# Patient Record
Sex: FEMALE | Race: WHITE | NOT HISPANIC OR LATINO | Employment: OTHER | URBAN - METROPOLITAN AREA
[De-identification: names, ages, dates, MRNs, and addresses within clinical notes are randomized per-mention and may not be internally consistent; named-entity substitution may affect disease eponyms.]

---

## 2017-02-06 ENCOUNTER — TELEPHONE (OUTPATIENT)
Dept: CARDIOLOGY | Facility: MEDICAL CENTER | Age: 82
End: 2017-02-06

## 2017-02-07 NOTE — TELEPHONE ENCOUNTER
----- Message from Brittny Lynn sent at 2/6/2017  4:23 PM PST -----  Regarding: [facility] calling for ICD-10 code  LESLIE/Josefina      Daria at [facility] is calling to get a ICD-10 code. She can be reached at [phone], ext. [phone].

## 2017-04-07 ENCOUNTER — OFFICE VISIT (OUTPATIENT)
Dept: CARDIOLOGY | Facility: CLINIC | Age: 82
End: 2017-04-07
Payer: MEDICARE

## 2017-04-07 VITALS
WEIGHT: 148 LBS | DIASTOLIC BLOOD PRESSURE: 72 MMHG | SYSTOLIC BLOOD PRESSURE: 144 MMHG | HEIGHT: 65 IN | OXYGEN SATURATION: 93 % | BODY MASS INDEX: 24.66 KG/M2 | HEART RATE: 68 BPM

## 2017-04-07 DIAGNOSIS — I48.0 PAROXYSMAL ATRIAL FIBRILLATION (HCC): ICD-10-CM

## 2017-04-07 DIAGNOSIS — R60.0 LOCALIZED EDEMA: ICD-10-CM

## 2017-04-07 DIAGNOSIS — I10 ESSENTIAL HYPERTENSION: ICD-10-CM

## 2017-04-07 DIAGNOSIS — J01.11 ACUTE RECURRENT FRONTAL SINUSITIS: ICD-10-CM

## 2017-04-07 PROCEDURE — G8432 DEP SCR NOT DOC, RNG: HCPCS | Performed by: INTERNAL MEDICINE

## 2017-04-07 PROCEDURE — 1036F TOBACCO NON-USER: CPT | Performed by: INTERNAL MEDICINE

## 2017-04-07 PROCEDURE — 4040F PNEUMOC VAC/ADMIN/RCVD: CPT | Performed by: INTERNAL MEDICINE

## 2017-04-07 PROCEDURE — G8420 CALC BMI NORM PARAMETERS: HCPCS | Performed by: INTERNAL MEDICINE

## 2017-04-07 PROCEDURE — 1101F PT FALLS ASSESS-DOCD LE1/YR: CPT | Mod: 8P | Performed by: INTERNAL MEDICINE

## 2017-04-07 PROCEDURE — 99214 OFFICE O/P EST MOD 30 MIN: CPT | Performed by: INTERNAL MEDICINE

## 2017-04-07 RX ORDER — OMEPRAZOLE 20 MG/1
20 CAPSULE, DELAYED RELEASE ORAL DAILY
COMMUNITY
Start: 2017-01-13 | End: 2019-10-29

## 2017-04-07 RX ORDER — IPRATROPIUM BROMIDE 42 UG/1
1 SPRAY, METERED NASAL DAILY
COMMUNITY
Start: 2017-03-16 | End: 2020-08-05

## 2017-04-07 RX ORDER — SPIRONOLACTONE 25 MG/1
TABLET ORAL
COMMUNITY
Start: 2017-03-22 | End: 2018-03-20 | Stop reason: SDUPTHER

## 2017-04-07 RX ORDER — DOXYCYCLINE 100 MG/1
100 TABLET ORAL 2 TIMES DAILY
Qty: 20 TAB | Refills: 0 | Status: SHIPPED | OUTPATIENT
Start: 2017-04-07 | End: 2018-03-20

## 2017-04-07 RX ORDER — FUROSEMIDE 20 MG/1
20 TABLET ORAL 2 TIMES DAILY
Qty: 60 TAB | Refills: 11 | Status: SHIPPED | OUTPATIENT
Start: 2017-04-07 | End: 2018-05-04 | Stop reason: SDUPTHER

## 2017-04-07 RX ORDER — SOTALOL HYDROCHLORIDE 80 MG/1
TABLET ORAL
COMMUNITY
Start: 2017-03-16 | End: 2018-05-05

## 2017-04-07 RX ORDER — FLUTICASONE PROPIONATE 50 MCG
1 SPRAY, SUSPENSION (ML) NASAL DAILY
COMMUNITY
Start: 2017-03-16 | End: 2020-08-05

## 2017-04-07 RX ORDER — AMOXICILLIN 500 MG/1
CAPSULE ORAL
COMMUNITY
Start: 2017-04-05 | End: 2018-05-05

## 2017-04-07 RX ORDER — APIXABAN 2.5 MG/1
2.5 TABLET, FILM COATED ORAL 2 TIMES DAILY
Status: ON HOLD | COMMUNITY
Start: 2017-04-04 | End: 2018-05-11

## 2017-04-07 ASSESSMENT — ENCOUNTER SYMPTOMS
NEUROLOGICAL NEGATIVE: 1
WEAKNESS: 0
HEMOPTYSIS: 0
STRIDOR: 0
CONSTITUTIONAL NEGATIVE: 1
COUGH: 0
SPUTUM PRODUCTION: 0
FEVER: 0
CARDIOVASCULAR NEGATIVE: 1
BRUISES/BLEEDS EASILY: 0
LOSS OF CONSCIOUSNESS: 0
DIZZINESS: 0
WHEEZING: 0
SHORTNESS OF BREATH: 0
SORE THROAT: 0
CHILLS: 0
GASTROINTESTINAL NEGATIVE: 1
EYES NEGATIVE: 1
RESPIRATORY NEGATIVE: 1
MUSCULOSKELETAL NEGATIVE: 1
CLAUDICATION: 0
ORTHOPNEA: 0
PND: 0
PALPITATIONS: 0

## 2017-04-07 NOTE — PROGRESS NOTES
Subjective:   Jeny Bennett is a 83 y.o. female who presents today for follow up for her LE and a-fib.  No recurrence of her a-fib.  She is still bothered by some LE edema.  Otherwise she is feeling well.    Past Medical History   Diagnosis Date   • Arthritis    • Back pain    • Fall 12-   • Hypertension    • Arrhythmia      History reviewed. No pertinent past surgical history.  Family History   Problem Relation Age of Onset   • Arthritis Mother    • Cancer Mother    • Hypertension Mother    • Hypertension Father    • Heart Disease Father    • Hyperlipidemia Father    • Lung Disease Sister    • Hypertension Sister      History   Smoking status   • Former Smoker -- 0.50 packs/day   • Types: Cigarettes   • Quit date: 01/01/1989   Smokeless tobacco   • Never Used     Allergies   Allergen Reactions   • Amiodarone      Atrial fibrillation..organ prob's   • Sulfa Drugs      Rash, chills, fever   • Augmentin      Outpatient Encounter Prescriptions as of 4/7/2017   Medication Sig Dispense Refill   • omeprazole (PRILOSEC) 20 MG delayed-release capsule      • spironolactone (ALDACTONE) 25 MG Tab      • ELIQUIS 2.5 MG Tab      • amoxicillin (AMOXIL) 500 MG Cap      • SOTALOL AF 80 MG Tab      • fluticasone (FLONASE) 50 MCG/ACT nasal spray      • ipratropium (ATROVENT) 0.06 % Solution      • mupirocin (BACTROBAN) 2 % Ointment      • famotidine (PEPCID) 20 MG Tab Take 20 mg by mouth 2 times a day.     • venlafaxine XR (EFFEXOR XR) 37.5 MG CAPSULE SR 24 HR Take 37.5 mg by mouth every day.     • meloxicam (MOBIC) 7.5 MG TABS Take 7.5 mg by mouth 2 Times a Day.     • hydrocodone-acetaminophen (NORCO) 7.5-325 MG per tablet Take 1-2 Tabs by mouth every four hours as needed for Severe Pain.       No facility-administered encounter medications on file as of 4/7/2017.     Review of Systems   Constitutional: Negative.  Negative for fever, chills and malaise/fatigue.   HENT: Negative.  Negative for sore throat.    Eyes:  "Negative.    Respiratory: Negative.  Negative for cough, hemoptysis, sputum production, shortness of breath, wheezing and stridor.    Cardiovascular: Negative.  Negative for chest pain, palpitations, orthopnea, claudication, leg swelling and PND.   Gastrointestinal: Negative.    Genitourinary: Negative.    Musculoskeletal: Negative.    Skin: Negative.    Neurological: Negative.  Negative for dizziness, loss of consciousness and weakness.   Endo/Heme/Allergies: Negative.  Does not bruise/bleed easily.   All other systems reviewed and are negative.       Objective:   /72 mmHg  Pulse 68  Ht 1.651 m (5' 5\")  Wt 67.132 kg (148 lb)  BMI 24.63 kg/m2  SpO2 93%    Physical Exam   Constitutional: She is oriented to person, place, and time. She appears well-developed and well-nourished. No distress.   HENT:   Head: Normocephalic.   Mouth/Throat: Oropharynx is clear and moist.   Eyes: EOM are normal. Pupils are equal, round, and reactive to light. Right eye exhibits no discharge. Left eye exhibits no discharge. No scleral icterus.   Neck: Normal range of motion. Neck supple. No JVD present. No tracheal deviation present.   Cardiovascular: Normal rate, regular rhythm, S1 normal, S2 normal, normal heart sounds, intact distal pulses and normal pulses.  Exam reveals no gallop, no S3, no S4 and no friction rub.    No murmur heard.   No systolic murmur is present    No diastolic murmur is present   Pulses:       Carotid pulses are 2+ on the right side, and 2+ on the left side.       Radial pulses are 2+ on the right side, and 2+ on the left side.        Dorsalis pedis pulses are 2+ on the right side, and 2+ on the left side.        Posterior tibial pulses are 2+ on the right side, and 2+ on the left side.   Pulmonary/Chest: Effort normal and breath sounds normal. No respiratory distress. She has no wheezes. She has no rales.   Abdominal: Soft. Bowel sounds are normal. She exhibits no distension and no mass. There is no " tenderness. There is no rebound and no guarding.   Musculoskeletal: She exhibits no edema.   Neurological: She is alert and oriented to person, place, and time. No cranial nerve deficit.   Skin: Skin is warm and dry. She is not diaphoretic. No pallor.   Psychiatric: She has a normal mood and affect. Her behavior is normal. Judgment and thought content normal.   Nursing note and vitals reviewed.      Assessment:     1. Paroxysmal atrial fibrillation (CMS-HCC)     2. Localized edema     3. Essential hypertension         Medical Decision Making:  Today's Assessment / Status / Plan:     84 y/o F with LE edema whoich is bothering her and controlled a-fib.  I will start a low dose of lasix for her LE edema and check labs in one week.  i will see her back in 6 months.    Thank for you allowing me to take part in your patient's care, please call should you have any questions or would like to discuss this patient.

## 2017-04-11 DIAGNOSIS — I10 ESSENTIAL HYPERTENSION: ICD-10-CM

## 2017-04-11 DIAGNOSIS — Z79.899 HIGH RISK MEDICATION USE: ICD-10-CM

## 2017-04-18 ENCOUNTER — TELEPHONE (OUTPATIENT)
Dept: CARDIOLOGY | Facility: MEDICAL CENTER | Age: 82
End: 2017-04-18

## 2017-04-18 NOTE — TELEPHONE ENCOUNTER
BASIC METABOLIC PANEL   Status: Final result     Visible to patient:  Not Released     Dx:  High risk medication use; Essential h...               Notes Recorded by Eduardo Maddox M.D. on 4/18/2017 at 10:42 AM  Labs look good  Notes Recorded by Josefina Arenas L.P.N. on 4/17/2017 at 12:15 PM  FU 10/19 in [place] with Dr. Maddox     Pt notified of results.

## 2017-04-21 ENCOUNTER — TELEPHONE (OUTPATIENT)
Dept: CARDIOLOGY | Facility: MEDICAL CENTER | Age: 82
End: 2017-04-21

## 2017-04-21 NOTE — TELEPHONE ENCOUNTER
LESLIE/Lisa   Patient is calling about dental clearance for a cleaning. She can be reached at [phone].              L/m for pt on home phone and cell phone about clearance. Pt should not need clearance for dental cleaning nor need to hold her eliquis. Informed pt to please call back to discuss further.

## 2017-06-14 ENCOUNTER — TELEPHONE (OUTPATIENT)
Dept: CARDIOLOGY | Facility: MEDICAL CENTER | Age: 82
End: 2017-06-14

## 2017-06-14 NOTE — TELEPHONE ENCOUNTER
dental office calling for clearance  Received: Today       BRANDAN Colunga/Any Maldonado @ Dr. Caruso office in [place] is calling for clearance. They need patient to stop Xarelto for dental work. She can be reached at [phone]              Returned call. Requested that clearance be faxed, we will complete and fax back.

## 2018-01-08 ENCOUNTER — TELEPHONE (OUTPATIENT)
Dept: CARDIOLOGY | Facility: MEDICAL CENTER | Age: 83
End: 2018-01-08

## 2018-01-08 NOTE — TELEPHONE ENCOUNTER
Patient wants call back asap about stopping blood thinner   Received: Today   Message Contents   BRANDAN Ling/Any     Patient has dental work scheduled for tomorrow, 1/9 and wants to find out about stopping her blood thinner. She wants a call back asap and can be reached at [phone].      Returned patient call. Pt is having a general dental filling tomorrow and wants to know if it is OK to continue to take Eliquis. R/o it is not a crown buildup. Pt advised to take medications as routine, pt also advised that is dental office needed her to hold, they would have notified her and contacted us per routine. Pt states understanding.

## 2018-03-06 ENCOUNTER — TELEPHONE (OUTPATIENT)
Dept: CARDIOLOGY | Facility: MEDICAL CENTER | Age: 83
End: 2018-03-06

## 2018-03-20 ENCOUNTER — OFFICE VISIT (OUTPATIENT)
Dept: CARDIOLOGY | Facility: MEDICAL CENTER | Age: 83
End: 2018-03-20
Payer: MEDICARE

## 2018-03-20 VITALS
HEART RATE: 68 BPM | WEIGHT: 145 LBS | OXYGEN SATURATION: 97 % | SYSTOLIC BLOOD PRESSURE: 180 MMHG | BODY MASS INDEX: 24.13 KG/M2 | DIASTOLIC BLOOD PRESSURE: 90 MMHG

## 2018-03-20 DIAGNOSIS — Z79.899 HIGH RISK MEDICATION USE: ICD-10-CM

## 2018-03-20 DIAGNOSIS — R60.0 LOCALIZED EDEMA: ICD-10-CM

## 2018-03-20 DIAGNOSIS — I48.0 PAROXYSMAL ATRIAL FIBRILLATION (HCC): ICD-10-CM

## 2018-03-20 DIAGNOSIS — I10 ESSENTIAL HYPERTENSION: ICD-10-CM

## 2018-03-20 PROCEDURE — 99215 OFFICE O/P EST HI 40 MIN: CPT | Performed by: INTERNAL MEDICINE

## 2018-03-20 RX ORDER — SPIRONOLACTONE 25 MG/1
25 TABLET ORAL DAILY
Qty: 30 TAB | Refills: 11 | Status: ON HOLD | OUTPATIENT
Start: 2018-03-20 | End: 2018-05-11

## 2018-03-20 ASSESSMENT — ENCOUNTER SYMPTOMS
NEUROLOGICAL NEGATIVE: 1
STRIDOR: 0
CONSTITUTIONAL NEGATIVE: 1
CARDIOVASCULAR NEGATIVE: 1
RESPIRATORY NEGATIVE: 1
CLAUDICATION: 0
PALPITATIONS: 0
SPUTUM PRODUCTION: 0
DIZZINESS: 0
COUGH: 0
SHORTNESS OF BREATH: 0
GASTROINTESTINAL NEGATIVE: 1
EYES NEGATIVE: 1
HEMOPTYSIS: 0
SORE THROAT: 0
MUSCULOSKELETAL NEGATIVE: 1
BRUISES/BLEEDS EASILY: 0
WHEEZING: 0
WEAKNESS: 0
CHILLS: 0
ORTHOPNEA: 0
FEVER: 0
LOSS OF CONSCIOUSNESS: 0
PND: 0

## 2018-03-20 NOTE — LETTER
[facility]   [address]   [address]  [place] [address]  Phone: [phone]  Fax: [phone]              Jeny Bennett  5/19/1933    Encounter Date: 3/20/2018    Eduardo Maddox M.D.          PROGRESS NOTE:  Chief Complaint   Patient presents with   • Atrial Fibrillation     follow up   edema  High risk medication      Subjective:   Jeny Bennett is a 84 y.o. female who presents today for her paroxysmal atrial flutter ablation hypertension and lower extremity edema. Apparently there was some confusion about what dose of Lasix she should've been taking. She's only taking the Lasix once per day. The lower extremity edema we noted her last visit essentially resolved. Her blood pressure continues to be elevated. It's in the 160s consistently. She does check it at home. She is also taking, in addition of NSAIDs with her oral anticoagulation. She's not doing this every day and is concerned about the risk of bleeding. She was told 10-20 years ago to be careful with bleeding because she had a case of colitis that was seen by a GI physician. In the meantime she did not have any worsening chest pain palpitations or PND. She also completed the labs that we gave her last year. She is not currently taking the spironolactone we prescribed to her. She is also not clear what dose of lisinopril she's currently taking.    Past Medical History:   Diagnosis Date   • Arrhythmia    • Arthritis    • Back pain    • Fall 12-   • Hypertension      History reviewed. No pertinent surgical history.  Family History   Problem Relation Age of Onset   • Arthritis Mother    • Cancer Mother    • Hypertension Mother    • Hypertension Father    • Heart Disease Father    • Hyperlipidemia Father    • Lung Disease Sister    • Hypertension Sister      Social History     Social History   • Marital status: Unknown     Spouse name: N/A   • Number of children:  N/A   • Years of education: N/A     Occupational History   • Not on file.     Social History Main Topics   • Smoking status: Former Smoker     Packs/day: 0.50     Types: Cigarettes     Quit date: 1/1/1989   • Smokeless tobacco: Never Used   • Alcohol use Not on file   • Drug use: Unknown   • Sexual activity: Not on file     Other Topics Concern   • Not on file     Social History Narrative   • No narrative on file     Allergies   Allergen Reactions   • Amiodarone      Atrial fibrillation..organ prob's   • Sulfa Drugs      Rash, chills, fever   • Augmentin      Outpatient Encounter Prescriptions as of 3/20/2018   Medication Sig Dispense Refill   • spironolactone (ALDACTONE) 25 MG Tab Take 1 Tab by mouth every day. 30 Tab 11   • omeprazole (PRILOSEC) 20 MG delayed-release capsule      • ELIQUIS 2.5 MG Tab      • amoxicillin (AMOXIL) 500 MG Cap      • SOTALOL AF 80 MG Tab      • fluticasone (FLONASE) 50 MCG/ACT nasal spray      • ipratropium (ATROVENT) 0.06 % Solution      • mupirocin (BACTROBAN) 2 % Ointment      • furosemide (LASIX) 20 MG Tab Take 1 Tab by mouth 2 times a day. 60 Tab 11   • famotidine (PEPCID) 20 MG Tab Take 20 mg by mouth 2 times a day.     • venlafaxine XR (EFFEXOR XR) 37.5 MG CAPSULE SR 24 HR Take 37.5 mg by mouth every day.     • meloxicam (MOBIC) 7.5 MG TABS Take 7.5 mg by mouth 2 Times a Day.     • hydrocodone-acetaminophen (NORCO) 7.5-325 MG per tablet Take 1-2 Tabs by mouth every four hours as needed for Severe Pain.     • [DISCONTINUED] spironolactone (ALDACTONE) 25 MG Tab      • [DISCONTINUED] doxycycline monohydrate (ADOXA) 100 MG tablet Take 1 Tab by mouth 2 times a day. (Patient not taking: Reported on 3/20/2018) 20 Tab 0     No facility-administered encounter medications on file as of 3/20/2018.      Review of Systems   Constitutional: Negative.  Negative for chills, fever and malaise/fatigue.   HENT: Negative.  Negative for sore throat.    Eyes: Negative.    Respiratory: Negative.   Negative for cough, hemoptysis, sputum production, shortness of breath, wheezing and stridor.    Cardiovascular: Negative.  Negative for chest pain, palpitations, orthopnea, claudication, leg swelling and PND.   Gastrointestinal: Negative.    Genitourinary: Negative.    Musculoskeletal: Negative.    Skin: Negative.    Neurological: Negative.  Negative for dizziness, loss of consciousness and weakness.   Endo/Heme/Allergies: Negative.  Does not bruise/bleed easily.   All other systems reviewed and are negative.       Objective:   BP (!) 180/90   Pulse 68   Wt 65.8 kg (145 lb)   SpO2 97%   BMI 24.13 kg/m²      Physical Exam   Constitutional: She appears well-developed and well-nourished. No distress.   HENT:   Head: Normocephalic and atraumatic.   Right Ear: External ear normal.   Left Ear: External ear normal.   Nose: Nose normal.   Mouth/Throat: No oropharyngeal exudate.   Eyes: Conjunctivae and EOM are normal. Pupils are equal, round, and reactive to light. Right eye exhibits no discharge. Left eye exhibits no discharge. No scleral icterus.   Neck: Neck supple. No JVD present.   Cardiovascular: Normal rate, regular rhythm and intact distal pulses.  Exam reveals no gallop and no friction rub.    No murmur heard.  Pulmonary/Chest: Effort normal. No stridor. No respiratory distress. She has no wheezes. She has no rales. She exhibits no tenderness.   Abdominal: Soft. She exhibits no distension. There is no guarding.   Musculoskeletal: Normal range of motion. She exhibits no edema, tenderness or deformity.   Neurological: She is alert. She has normal reflexes. She displays normal reflexes. No cranial nerve deficit. She exhibits normal muscle tone. Coordination normal.   Skin: Skin is warm and dry. No rash noted. She is not diaphoretic. No erythema. No pallor.   Psychiatric: She has a normal mood and affect. Her behavior is normal. Judgment and thought content normal.   Nursing note and vitals  reviewed.      Assessment:     1. Paroxysmal atrial fibrillation (CMS-HCC)     2. Localized edema  spironolactone (ALDACTONE) 25 MG Tab    BASIC METABOLIC PANEL   3. Essential hypertension  spironolactone (ALDACTONE) 25 MG Tab    BASIC METABOLIC PANEL   4. High risk medication use           Medical Decision Making:  Today's Assessment / Status / Plan:     84-year-old female with paroxysmal atrial fibrillation and worsening lower extremity edema with blood pressure which is poorly controlled. For her atrial fibrillation no changes today. She is in sinus rhythm. For her high blood pressure I've asked her to restart the spironolactone and contact the clinic and let us know what dose of lisinopril she is currently taking. We will get labs in one week to verify she is not developing hyperkalemia. For her lower extremity edema similarly we will start spironolactone. I've also advised her to stay on the Lasix once per day.    Thank for you allowing me to take part in your patient's care, please call should you have any questions or would like to discuss this patient.      Cee Raoms M.D.  [address] [facility] [address]  VIA Facsimile: [phone]

## 2018-03-20 NOTE — PROGRESS NOTES
Chief Complaint   Patient presents with   • Atrial Fibrillation     follow up   edema  High risk medication      Subjective:   Jeny Bennett is a 84 y.o. female who presents today for her paroxysmal atrial flutter ablation hypertension and lower extremity edema. Apparently there was some confusion about what dose of Lasix she should've been taking. She's only taking the Lasix once per day. The lower extremity edema we noted her last visit essentially resolved. Her blood pressure continues to be elevated. It's in the 160s consistently. She does check it at home. She is also taking, in addition of NSAIDs with her oral anticoagulation. She's not doing this every day and is concerned about the risk of bleeding. She was told 10-20 years ago to be careful with bleeding because she had a case of colitis that was seen by a GI physician. In the meantime she did not have any worsening chest pain palpitations or PND. She also completed the labs that we gave her last year. She is not currently taking the spironolactone we prescribed to her. She is also not clear what dose of lisinopril she's currently taking.    Past Medical History:   Diagnosis Date   • Arrhythmia    • Arthritis    • Back pain    • Fall 12-   • Hypertension      History reviewed. No pertinent surgical history.  Family History   Problem Relation Age of Onset   • Arthritis Mother    • Cancer Mother    • Hypertension Mother    • Hypertension Father    • Heart Disease Father    • Hyperlipidemia Father    • Lung Disease Sister    • Hypertension Sister      Social History     Social History   • Marital status: Unknown     Spouse name: N/A   • Number of children: N/A   • Years of education: N/A     Occupational History   • Not on file.     Social History Main Topics   • Smoking status: Former Smoker     Packs/day: 0.50     Types: Cigarettes     Quit date: 1/1/1989   • Smokeless tobacco: Never Used   • Alcohol use Not on file   • Drug use: Unknown   •  Sexual activity: Not on file     Other Topics Concern   • Not on file     Social History Narrative   • No narrative on file     Allergies   Allergen Reactions   • Amiodarone      Atrial fibrillation..organ prob's   • Sulfa Drugs      Rash, chills, fever   • Augmentin      Outpatient Encounter Prescriptions as of 3/20/2018   Medication Sig Dispense Refill   • spironolactone (ALDACTONE) 25 MG Tab Take 1 Tab by mouth every day. 30 Tab 11   • omeprazole (PRILOSEC) 20 MG delayed-release capsule      • ELIQUIS 2.5 MG Tab      • amoxicillin (AMOXIL) 500 MG Cap      • SOTALOL AF 80 MG Tab      • fluticasone (FLONASE) 50 MCG/ACT nasal spray      • ipratropium (ATROVENT) 0.06 % Solution      • mupirocin (BACTROBAN) 2 % Ointment      • furosemide (LASIX) 20 MG Tab Take 1 Tab by mouth 2 times a day. 60 Tab 11   • famotidine (PEPCID) 20 MG Tab Take 20 mg by mouth 2 times a day.     • venlafaxine XR (EFFEXOR XR) 37.5 MG CAPSULE SR 24 HR Take 37.5 mg by mouth every day.     • meloxicam (MOBIC) 7.5 MG TABS Take 7.5 mg by mouth 2 Times a Day.     • hydrocodone-acetaminophen (NORCO) 7.5-325 MG per tablet Take 1-2 Tabs by mouth every four hours as needed for Severe Pain.     • [DISCONTINUED] spironolactone (ALDACTONE) 25 MG Tab      • [DISCONTINUED] doxycycline monohydrate (ADOXA) 100 MG tablet Take 1 Tab by mouth 2 times a day. (Patient not taking: Reported on 3/20/2018) 20 Tab 0     No facility-administered encounter medications on file as of 3/20/2018.      Review of Systems   Constitutional: Negative.  Negative for chills, fever and malaise/fatigue.   HENT: Negative.  Negative for sore throat.    Eyes: Negative.    Respiratory: Negative.  Negative for cough, hemoptysis, sputum production, shortness of breath, wheezing and stridor.    Cardiovascular: Negative.  Negative for chest pain, palpitations, orthopnea, claudication, leg swelling and PND.   Gastrointestinal: Negative.    Genitourinary: Negative.    Musculoskeletal: Negative.     Skin: Negative.    Neurological: Negative.  Negative for dizziness, loss of consciousness and weakness.   Endo/Heme/Allergies: Negative.  Does not bruise/bleed easily.   All other systems reviewed and are negative.       Objective:   BP (!) 180/90   Pulse 68   Wt 65.8 kg (145 lb)   SpO2 97%   BMI 24.13 kg/m²     Physical Exam   Constitutional: She appears well-developed and well-nourished. No distress.   HENT:   Head: Normocephalic and atraumatic.   Right Ear: External ear normal.   Left Ear: External ear normal.   Nose: Nose normal.   Mouth/Throat: No oropharyngeal exudate.   Eyes: Conjunctivae and EOM are normal. Pupils are equal, round, and reactive to light. Right eye exhibits no discharge. Left eye exhibits no discharge. No scleral icterus.   Neck: Neck supple. No JVD present.   Cardiovascular: Normal rate, regular rhythm and intact distal pulses.  Exam reveals no gallop and no friction rub.    No murmur heard.  Pulmonary/Chest: Effort normal. No stridor. No respiratory distress. She has no wheezes. She has no rales. She exhibits no tenderness.   Abdominal: Soft. She exhibits no distension. There is no guarding.   Musculoskeletal: Normal range of motion. She exhibits no edema, tenderness or deformity.   Neurological: She is alert. She has normal reflexes. She displays normal reflexes. No cranial nerve deficit. She exhibits normal muscle tone. Coordination normal.   Skin: Skin is warm and dry. No rash noted. She is not diaphoretic. No erythema. No pallor.   Psychiatric: She has a normal mood and affect. Her behavior is normal. Judgment and thought content normal.   Nursing note and vitals reviewed.      Assessment:     1. Paroxysmal atrial fibrillation (CMS-HCC)     2. Localized edema  spironolactone (ALDACTONE) 25 MG Tab    BASIC METABOLIC PANEL   3. Essential hypertension  spironolactone (ALDACTONE) 25 MG Tab    BASIC METABOLIC PANEL   4. High risk medication use           Medical Decision Making:   Today's Assessment / Status / Plan:     84-year-old female with paroxysmal atrial fibrillation and worsening lower extremity edema with blood pressure which is poorly controlled. For her atrial fibrillation no changes today. She is in sinus rhythm. For her high blood pressure I've asked her to restart the spironolactone and contact the clinic and let us know what dose of lisinopril she is currently taking. We will get labs in one week to verify she is not developing hyperkalemia. For her lower extremity edema similarly we will start spironolactone. I've also advised her to stay on the Lasix once per day.    Thank for you allowing me to take part in your patient's care, please call should you have any questions or would like to discuss this patient.

## 2018-03-30 DIAGNOSIS — R60.0 LOCALIZED EDEMA: ICD-10-CM

## 2018-03-30 DIAGNOSIS — I10 ESSENTIAL HYPERTENSION: ICD-10-CM

## 2018-04-05 ENCOUNTER — TELEPHONE (OUTPATIENT)
Dept: CARDIOLOGY | Facility: MEDICAL CENTER | Age: 83
End: 2018-04-05

## 2018-04-05 NOTE — TELEPHONE ENCOUNTER
BASIC METABOLIC PANEL   Order: [id number]   Status:  Final result   Visible to patient:  No (Inaccessible in MyChart) Dx:  Localized edema; Essential hypertension   Notes Recorded by Eduardo Maddox M.D. on 4/4/2018 at 6:28 PM PDT  Please let patient know labs look good     ______________________________________________    Contacted patient, discussed above note.  Patient verbalizes understanding.

## 2018-04-22 ENCOUNTER — TELEPHONE (OUTPATIENT)
Dept: CARDIOLOGY | Facility: MEDICAL CENTER | Age: 83
End: 2018-04-22

## 2018-04-23 NOTE — TELEPHONE ENCOUNTER
"Message   Received: Today   Message Contents   TOD Barreto R.N.   Caller: Unspecified (Yesterday,  6:02 PM)             Does she want to come up to [place] for a cardioversion?      ___________________________    Contacted patient, she would like to think about the cardioversion.  She wants to continue on her medication and call the office next week.  At that time she will make a decision. She would like to discuss with her daughter as well.      Advised to make a sooner F/V with Dr. Maddox as well.  Again, patient states she will call next week.      She also states she was told her sodium level was \"really low\" in the ER, but she doesn't know the level.     Records already requested from [facility] and pending receipt.  Will review/have Dr. Maddox review results when they are available.     Advised patient to call sooner if symptoms return or other concerns arise.  Patient verbalizes understanding.    To Dr. Maddox  "

## 2018-04-23 NOTE — TELEPHONE ENCOUNTER
I was called from the emergency room and [facility] while on call tonight. This patient there. Tired. No other symptoms. Vital signs stable although she is in atrial fibrillation in the 110s. Blood pressure normal. Compliant with meds including anticoagulation    Recommend rate control  Multiple questions about cardioversion which is reasonable if highly symptomatic or unstable    Will tell Dr. Maddox's nurse to contact patient in the morning on Monday. Patient will be expecting phone call

## 2018-04-23 NOTE — TELEPHONE ENCOUNTER
"Contacted patient.  Patient states she \"feels much better today\".  She states she was given Cardizem in the ER (She was given an Rx, its to be delivered today, she doesn't recall the mg).  She was instructed to keep taking Sotolol.      Patient denies CP, SOB, difficulty breathing, dizziness or lightheadedness.      She states she hasn't been feeling good for the last couple of weeks, she thought maybe it was due to sinuses, but two days a go she noticed her HR was increased and realized it was her Afib.  She called [facility] for advice and they advised to go to the ER.      Advised patient to take medications as prescribed and call with any questions, concerns, or new symptoms to report. Discussed s/s of chest pain, chest pressure, shortness of breath, difficulty breathing and when to initiate EMS.  Patient verbalizes understanding.     ER notes requested from [facility]    Trudy F/V 9/6/18 with Dr. Maddox    To Dr. Maddox to advise      "

## 2018-04-30 ENCOUNTER — TELEPHONE (OUTPATIENT)
Dept: CARDIOLOGY | Facility: MEDICAL CENTER | Age: 83
End: 2018-04-30

## 2018-04-30 NOTE — TELEPHONE ENCOUNTER
"Problem with slow heart beats   Received: Today   Message Contents   BRANDAN Alonso     Patient is taking Cardizem and Sotalol and is having problems with slow heart beats, 48-50 range. She wants a call back at [phone].      Contacted patient, patient, she states she feels the Cardizem is causing side effects.     Patient c/o Low energy, weak, fatigued. HR between 48-50 Last   (she doesn't remember the DBP)    Denies dizziness, light-headedness, chest pain, SOB, does admit to some mild swelling in feet.     She is inquiring if her Cardizem should be changed.     She is still considering a cardioversion, but is waiting \"until her daughter gets in on Thursday\".  Her daughter is already aware of the consideration and is willing to bring her to [place].  But patient wants to discuss if further before proceeding.       To Dr. Maddox to advise  ______________________________________    TOD Barreto R.N.   Caller: Unspecified (Yesterday,  9:23 AM)             Asymptomatic low HR is not an issue      ________________________________________    patient calling back to speak to you   Received: Yesterday   Message Contents   BRANDAN Herron       Patient is calling back to speak to you about her medication. She can be reached at [phone].      _________________________________________    Contacted patient, discussed Dr. Maddox's note.  She states she feel's a little better today. Her HR is stable per patient. She still admits to fatigue.  Patient admits to having a sinus infection.  Patient claims she's staying hydrated. Advised patient to follow up with PCP regarding sinus infection.    "

## 2018-05-04 DIAGNOSIS — R60.0 LOCALIZED EDEMA: ICD-10-CM

## 2018-05-04 RX ORDER — FUROSEMIDE 20 MG/1
20 TABLET ORAL
Qty: 30 TAB | Refills: 6 | Status: SHIPPED | OUTPATIENT
Start: 2018-05-04 | End: 2018-05-05

## 2018-05-05 ENCOUNTER — HOSPITAL ENCOUNTER (OUTPATIENT)
Dept: RADIOLOGY | Facility: MEDICAL CENTER | Age: 83
End: 2018-05-05

## 2018-05-05 ENCOUNTER — HOSPITAL ENCOUNTER (INPATIENT)
Facility: MEDICAL CENTER | Age: 83
LOS: 6 days | DRG: 243 | End: 2018-05-11
Attending: EMERGENCY MEDICINE | Admitting: INTERNAL MEDICINE
Payer: MEDICARE

## 2018-05-05 DIAGNOSIS — I48.91 ATRIAL FIBRILLATION WITH RVR (HCC): ICD-10-CM

## 2018-05-05 DIAGNOSIS — R00.1 BRADYCARDIA: ICD-10-CM

## 2018-05-05 DIAGNOSIS — Z95.0 S/P PLACEMENT OF CARDIAC PACEMAKER: ICD-10-CM

## 2018-05-05 DIAGNOSIS — I48.0 PAROXYSMAL ATRIAL FIBRILLATION (HCC): ICD-10-CM

## 2018-05-05 DIAGNOSIS — I10 ESSENTIAL HYPERTENSION: ICD-10-CM

## 2018-05-05 DIAGNOSIS — Z79.899 HIGH RISK MEDICATION USE: ICD-10-CM

## 2018-05-05 PROBLEM — Z79.01 CHRONIC ANTICOAGULATION: Status: ACTIVE | Noted: 2018-05-05

## 2018-05-05 PROBLEM — A41.9 SEPSIS (HCC): Status: ACTIVE | Noted: 2018-05-05

## 2018-05-05 PROBLEM — E87.5 HYPERKALEMIA: Status: ACTIVE | Noted: 2018-05-05

## 2018-05-05 LAB
ALBUMIN SERPL BCP-MCNC: 4 G/DL (ref 3.2–4.9)
ALBUMIN/GLOB SERPL: 1.4 G/DL
ALP SERPL-CCNC: 72 U/L (ref 30–99)
ALT SERPL-CCNC: 25 U/L (ref 2–50)
ANION GAP SERPL CALC-SCNC: 8 MMOL/L (ref 0–11.9)
APPEARANCE UR: CLEAR
APTT PPP: 30.7 SEC (ref 24.7–36)
AST SERPL-CCNC: 18 U/L (ref 12–45)
BACTERIA #/AREA URNS HPF: NEGATIVE /HPF
BASOPHILS # BLD AUTO: 0.2 % (ref 0–1.8)
BASOPHILS # BLD: 0.03 K/UL (ref 0–0.12)
BILIRUB SERPL-MCNC: 0.8 MG/DL (ref 0.1–1.5)
BILIRUB UR QL STRIP.AUTO: NEGATIVE
BNP SERPL-MCNC: 584 PG/ML (ref 0–100)
BUN SERPL-MCNC: 27 MG/DL (ref 8–22)
CALCIUM SERPL-MCNC: 10 MG/DL (ref 8.5–10.5)
CHLORIDE SERPL-SCNC: 95 MMOL/L (ref 96–112)
CO2 SERPL-SCNC: 30 MMOL/L (ref 20–33)
COLOR UR: YELLOW
CREAT SERPL-MCNC: 0.92 MG/DL (ref 0.5–1.4)
DIGOXIN SERPL-MCNC: 0.1 NG/ML (ref 0.8–2)
EKG IMPRESSION: NORMAL
EOSINOPHIL # BLD AUTO: 0.02 K/UL (ref 0–0.51)
EOSINOPHIL NFR BLD: 0.2 % (ref 0–6.9)
EPI CELLS #/AREA URNS HPF: ABNORMAL /HPF
ERYTHROCYTE [DISTWIDTH] IN BLOOD BY AUTOMATED COUNT: 42.3 FL (ref 35.9–50)
GLOBULIN SER CALC-MCNC: 2.8 G/DL (ref 1.9–3.5)
GLUCOSE BLD-MCNC: 77 MG/DL (ref 65–99)
GLUCOSE SERPL-MCNC: 68 MG/DL (ref 65–99)
GLUCOSE UR STRIP.AUTO-MCNC: 250 MG/DL
HCT VFR BLD AUTO: 40.1 % (ref 37–47)
HGB BLD-MCNC: 14.3 G/DL (ref 12–16)
HYALINE CASTS #/AREA URNS LPF: ABNORMAL /LPF
IMM GRANULOCYTES # BLD AUTO: 0.06 K/UL (ref 0–0.11)
IMM GRANULOCYTES NFR BLD AUTO: 0.5 % (ref 0–0.9)
INR PPP: 1.07 (ref 0.87–1.13)
KETONES UR STRIP.AUTO-MCNC: NEGATIVE MG/DL
LACTATE BLD-SCNC: 2.1 MMOL/L (ref 0.5–2)
LEUKOCYTE ESTERASE UR QL STRIP.AUTO: ABNORMAL
LYMPHOCYTES # BLD AUTO: 1 K/UL (ref 1–4.8)
LYMPHOCYTES NFR BLD: 7.9 % (ref 22–41)
MAGNESIUM SERPL-MCNC: 1.8 MG/DL (ref 1.5–2.5)
MCH RBC QN AUTO: 30.6 PG (ref 27–33)
MCHC RBC AUTO-ENTMCNC: 35.7 G/DL (ref 33.6–35)
MCV RBC AUTO: 85.7 FL (ref 81.4–97.8)
MICRO URNS: ABNORMAL
MONOCYTES # BLD AUTO: 1.25 K/UL (ref 0–0.85)
MONOCYTES NFR BLD AUTO: 9.9 % (ref 0–13.4)
NEUTROPHILS # BLD AUTO: 10.26 K/UL (ref 2–7.15)
NEUTROPHILS NFR BLD: 81.3 % (ref 44–72)
NITRITE UR QL STRIP.AUTO: NEGATIVE
NRBC # BLD AUTO: 0 K/UL
NRBC BLD-RTO: 0 /100 WBC
PH UR STRIP.AUTO: >=9 [PH]
PHOSPHATE SERPL-MCNC: 2.5 MG/DL (ref 2.5–4.5)
PLATELET # BLD AUTO: 279 K/UL (ref 164–446)
PMV BLD AUTO: 8.6 FL (ref 9–12.9)
POTASSIUM SERPL-SCNC: 3.8 MMOL/L (ref 3.6–5.5)
PROT SERPL-MCNC: 6.8 G/DL (ref 6–8.2)
PROT UR QL STRIP: NEGATIVE MG/DL
PROTHROMBIN TIME: 13.6 SEC (ref 12–14.6)
RBC # BLD AUTO: 4.68 M/UL (ref 4.2–5.4)
RBC # URNS HPF: ABNORMAL /HPF
RBC UR QL AUTO: ABNORMAL
SODIUM SERPL-SCNC: 133 MMOL/L (ref 135–145)
SP GR UR STRIP.AUTO: 1.01
T4 FREE SERPL-MCNC: 1.45 NG/DL (ref 0.53–1.43)
TROPONIN I SERPL-MCNC: 0.01 NG/ML (ref 0–0.04)
TSH SERPL DL<=0.005 MIU/L-ACNC: 1.73 UIU/ML (ref 0.38–5.33)
UROBILINOGEN UR STRIP.AUTO-MCNC: 0.2 MG/DL
WBC # BLD AUTO: 12.6 K/UL (ref 4.8–10.8)
WBC #/AREA URNS HPF: ABNORMAL /HPF

## 2018-05-05 PROCEDURE — 85025 COMPLETE CBC W/AUTO DIFF WBC: CPT

## 2018-05-05 PROCEDURE — 82962 GLUCOSE BLOOD TEST: CPT

## 2018-05-05 PROCEDURE — 99285 EMERGENCY DEPT VISIT HI MDM: CPT

## 2018-05-05 PROCEDURE — 85730 THROMBOPLASTIN TIME PARTIAL: CPT

## 2018-05-05 PROCEDURE — 96375 TX/PRO/DX INJ NEW DRUG ADDON: CPT

## 2018-05-05 PROCEDURE — 85610 PROTHROMBIN TIME: CPT

## 2018-05-05 PROCEDURE — 83735 ASSAY OF MAGNESIUM: CPT

## 2018-05-05 PROCEDURE — 700101 HCHG RX REV CODE 250: Performed by: EMERGENCY MEDICINE

## 2018-05-05 PROCEDURE — A9270 NON-COVERED ITEM OR SERVICE: HCPCS | Performed by: INTERNAL MEDICINE

## 2018-05-05 PROCEDURE — 770020 HCHG ROOM/CARE - TELE (206)

## 2018-05-05 PROCEDURE — 94760 N-INVAS EAR/PLS OXIMETRY 1: CPT

## 2018-05-05 PROCEDURE — 700102 HCHG RX REV CODE 250 W/ 637 OVERRIDE(OP): Performed by: INTERNAL MEDICINE

## 2018-05-05 PROCEDURE — A9270 NON-COVERED ITEM OR SERVICE: HCPCS | Performed by: EMERGENCY MEDICINE

## 2018-05-05 PROCEDURE — 80053 COMPREHEN METABOLIC PANEL: CPT

## 2018-05-05 PROCEDURE — 99223 1ST HOSP IP/OBS HIGH 75: CPT | Performed by: INTERNAL MEDICINE

## 2018-05-05 PROCEDURE — 99152 MOD SED SAME PHYS/QHP 5/>YRS: CPT

## 2018-05-05 PROCEDURE — 83605 ASSAY OF LACTIC ACID: CPT

## 2018-05-05 PROCEDURE — 93005 ELECTROCARDIOGRAM TRACING: CPT

## 2018-05-05 PROCEDURE — 700111 HCHG RX REV CODE 636 W/ 250 OVERRIDE (IP): Performed by: EMERGENCY MEDICINE

## 2018-05-05 PROCEDURE — 96365 THER/PROPH/DIAG IV INF INIT: CPT

## 2018-05-05 PROCEDURE — 36415 COLL VENOUS BLD VENIPUNCTURE: CPT

## 2018-05-05 PROCEDURE — 83880 ASSAY OF NATRIURETIC PEPTIDE: CPT

## 2018-05-05 PROCEDURE — 5A2204Z RESTORATION OF CARDIAC RHYTHM, SINGLE: ICD-10-PCS | Performed by: INTERNAL MEDICINE

## 2018-05-05 PROCEDURE — 84484 ASSAY OF TROPONIN QUANT: CPT

## 2018-05-05 PROCEDURE — 93005 ELECTROCARDIOGRAM TRACING: CPT | Performed by: EMERGENCY MEDICINE

## 2018-05-05 PROCEDURE — 80162 ASSAY OF DIGOXIN TOTAL: CPT

## 2018-05-05 PROCEDURE — 700111 HCHG RX REV CODE 636 W/ 250 OVERRIDE (IP): Performed by: INTERNAL MEDICINE

## 2018-05-05 PROCEDURE — 84439 ASSAY OF FREE THYROXINE: CPT

## 2018-05-05 PROCEDURE — 84443 ASSAY THYROID STIM HORMONE: CPT

## 2018-05-05 PROCEDURE — 81001 URINALYSIS AUTO W/SCOPE: CPT

## 2018-05-05 PROCEDURE — 96366 THER/PROPH/DIAG IV INF ADDON: CPT

## 2018-05-05 PROCEDURE — 700102 HCHG RX REV CODE 250 W/ 637 OVERRIDE(OP): Performed by: EMERGENCY MEDICINE

## 2018-05-05 PROCEDURE — 84100 ASSAY OF PHOSPHORUS: CPT

## 2018-05-05 RX ORDER — BISACODYL 10 MG
10 SUPPOSITORY, RECTAL RECTAL
Status: DISCONTINUED | OUTPATIENT
Start: 2018-05-05 | End: 2018-05-06

## 2018-05-05 RX ORDER — FLUTICASONE PROPIONATE 50 MCG
1 SPRAY, SUSPENSION (ML) NASAL DAILY
Status: DISCONTINUED | OUTPATIENT
Start: 2018-05-05 | End: 2018-05-05

## 2018-05-05 RX ORDER — POLYETHYLENE GLYCOL 3350 17 G/17G
1 POWDER, FOR SOLUTION ORAL
Status: DISCONTINUED | OUTPATIENT
Start: 2018-05-05 | End: 2018-05-06

## 2018-05-05 RX ORDER — MAGNESIUM SULFATE HEPTAHYDRATE 40 MG/ML
4 INJECTION, SOLUTION INTRAVENOUS ONCE
Status: COMPLETED | OUTPATIENT
Start: 2018-05-05 | End: 2018-05-05

## 2018-05-05 RX ORDER — METOPROLOL TARTRATE 1 MG/ML
5 INJECTION, SOLUTION INTRAVENOUS ONCE
Status: ACTIVE | OUTPATIENT
Start: 2018-05-05 | End: 2018-05-06

## 2018-05-05 RX ORDER — OMEPRAZOLE 20 MG/1
20 CAPSULE, DELAYED RELEASE ORAL DAILY
Status: DISCONTINUED | OUTPATIENT
Start: 2018-05-05 | End: 2018-05-06

## 2018-05-05 RX ORDER — MELOXICAM 7.5 MG/1
7.5 TABLET ORAL DAILY
Status: DISCONTINUED | OUTPATIENT
Start: 2018-05-05 | End: 2018-05-11 | Stop reason: HOSPADM

## 2018-05-05 RX ORDER — SOTALOL HYDROCHLORIDE 80 MG/1
80 TABLET ORAL 2 TIMES DAILY
Status: ON HOLD | COMMUNITY
End: 2018-05-11

## 2018-05-05 RX ORDER — DILTIAZEM HYDROCHLORIDE 240 MG/1
240 CAPSULE, COATED, EXTENDED RELEASE ORAL DAILY
Status: ON HOLD | COMMUNITY
End: 2018-05-11

## 2018-05-05 RX ORDER — SODIUM CHLORIDE 9 MG/ML
500 INJECTION, SOLUTION INTRAVENOUS
Status: DISCONTINUED | OUTPATIENT
Start: 2018-05-05 | End: 2018-05-06

## 2018-05-05 RX ORDER — AMOXICILLIN 250 MG
2 CAPSULE ORAL 2 TIMES DAILY
Status: DISCONTINUED | OUTPATIENT
Start: 2018-05-06 | End: 2018-05-06

## 2018-05-05 RX ORDER — SODIUM CHLORIDE 9 MG/ML
30 INJECTION, SOLUTION INTRAVENOUS
Status: DISCONTINUED | OUTPATIENT
Start: 2018-05-05 | End: 2018-05-05

## 2018-05-05 RX ORDER — POTASSIUM CHLORIDE 20 MEQ/1
40 TABLET, EXTENDED RELEASE ORAL ONCE
Status: COMPLETED | OUTPATIENT
Start: 2018-05-05 | End: 2018-05-05

## 2018-05-05 RX ORDER — HYDROCODONE BITARTRATE AND ACETAMINOPHEN 7.5; 325 MG/1; MG/1
1-2 TABLET ORAL EVERY 4 HOURS PRN
Status: DISCONTINUED | OUTPATIENT
Start: 2018-05-05 | End: 2018-05-11 | Stop reason: HOSPADM

## 2018-05-05 RX ORDER — SOTALOL HYDROCHLORIDE 80 MG/1
80 TABLET ORAL TWICE DAILY
Status: DISCONTINUED | OUTPATIENT
Start: 2018-05-05 | End: 2018-05-07

## 2018-05-05 RX ORDER — METOPROLOL TARTRATE 1 MG/ML
2.5 INJECTION, SOLUTION INTRAVENOUS ONCE
Status: COMPLETED | OUTPATIENT
Start: 2018-05-05 | End: 2018-05-05

## 2018-05-05 RX ORDER — SOTALOL HYDROCHLORIDE 80 MG/1
80 TABLET ORAL ONCE
Status: DISCONTINUED | OUTPATIENT
Start: 2018-05-05 | End: 2018-05-05

## 2018-05-05 RX ORDER — IPRATROPIUM BROMIDE 42 UG/1
1 SPRAY, METERED NASAL DAILY
Status: DISCONTINUED | OUTPATIENT
Start: 2018-05-05 | End: 2018-05-11 | Stop reason: HOSPADM

## 2018-05-05 RX ORDER — ETOMIDATE 2 MG/ML
INJECTION INTRAVENOUS
Status: COMPLETED | OUTPATIENT
Start: 2018-05-05 | End: 2018-05-05

## 2018-05-05 RX ADMIN — METOPROLOL TARTRATE 2.5 MG: 1 INJECTION, SOLUTION INTRAVENOUS at 06:52

## 2018-05-05 RX ADMIN — SOTALOL HYDROCHLORIDE 80 MG: 80 TABLET ORAL at 18:19

## 2018-05-05 RX ADMIN — ETOMIDATE 8 MG: 2 INJECTION INTRAVENOUS at 07:54

## 2018-05-05 RX ADMIN — CEFTRIAXONE SODIUM 1 G: 10 INJECTION, POWDER, FOR SOLUTION INTRAVENOUS at 13:55

## 2018-05-05 RX ADMIN — APIXABAN 5 MG: 5 TABLET, FILM COATED ORAL at 11:24

## 2018-05-05 RX ADMIN — METOPROLOL TARTRATE 25 MG: 25 TABLET, FILM COATED ORAL at 08:33

## 2018-05-05 RX ADMIN — APIXABAN 5 MG: 5 TABLET, FILM COATED ORAL at 18:20

## 2018-05-05 RX ADMIN — HYDROCODONE BITARTRATE AND ACETAMINOPHEN 1 TABLET: 7.5; 325 TABLET ORAL at 11:54

## 2018-05-05 RX ADMIN — SOTALOL HYDROCHLORIDE 80 MG: 80 TABLET ORAL at 10:01

## 2018-05-05 RX ADMIN — MAGNESIUM SULFATE IN WATER 4 G: 40 INJECTION, SOLUTION INTRAVENOUS at 07:58

## 2018-05-05 RX ADMIN — OMEPRAZOLE 20 MG: 20 CAPSULE, DELAYED RELEASE ORAL at 11:24

## 2018-05-05 RX ADMIN — MELOXICAM 7.5 MG: 7.5 TABLET ORAL at 13:55

## 2018-05-05 RX ADMIN — HYDROCODONE BITARTRATE AND ACETAMINOPHEN 1 TABLET: 7.5; 325 TABLET ORAL at 21:43

## 2018-05-05 RX ADMIN — POTASSIUM CHLORIDE 40 MEQ: 1500 TABLET, EXTENDED RELEASE ORAL at 08:31

## 2018-05-05 RX ADMIN — METOPROLOL TARTRATE 25 MG: 25 TABLET, FILM COATED ORAL at 18:20

## 2018-05-05 ASSESSMENT — LIFESTYLE VARIABLES
HAVE YOU EVER FELT YOU SHOULD CUT DOWN ON YOUR DRINKING: NO
ON A TYPICAL DAY WHEN YOU DRINK ALCOHOL HOW MANY DRINKS DO YOU HAVE: 1
TOTAL SCORE: 0
DO YOU DRINK ALCOHOL: YES
EVER FELT BAD OR GUILTY ABOUT YOUR DRINKING: NO
HAVE PEOPLE ANNOYED YOU BY CRITICIZING YOUR DRINKING: NO
CONSUMPTION TOTAL: NEGATIVE
EVER_SMOKED: YES
EVER HAD A DRINK FIRST THING IN THE MORNING TO STEADY YOUR NERVES TO GET RID OF A HANGOVER: NO
TOTAL SCORE: 0
HOW MANY TIMES IN THE PAST YEAR HAVE YOU HAD 5 OR MORE DRINKS IN A DAY: 0
TOTAL SCORE: 0
AVERAGE NUMBER OF DAYS PER WEEK YOU HAVE A DRINK CONTAINING ALCOHOL: 1

## 2018-05-05 ASSESSMENT — CHA2DS2 SCORE
PRIOR STROKE OR TIA OR THROMBOEMBOLISM: NO
VASCULAR DISEASE: NO
AGE 65 TO 74: NO
CHA2DS2 VASC SCORE: 4
SEX: FEMALE
HYPERTENSION: YES
CHF OR LEFT VENTRICULAR DYSFUNCTION: NO
AGE 75 OR GREATER: YES
DIABETES: NO

## 2018-05-05 ASSESSMENT — PATIENT HEALTH QUESTIONNAIRE - PHQ9
2. FEELING DOWN, DEPRESSED, IRRITABLE, OR HOPELESS: NOT AT ALL
SUM OF ALL RESPONSES TO PHQ9 QUESTIONS 1 AND 2: 0
1. LITTLE INTEREST OR PLEASURE IN DOING THINGS: NOT AT ALL

## 2018-05-05 ASSESSMENT — COGNITIVE AND FUNCTIONAL STATUS - GENERAL
CLIMB 3 TO 5 STEPS WITH RAILING: A LOT
MOBILITY SCORE: 18
SUGGESTED CMS G CODE MODIFIER DAILY ACTIVITY: CH
STANDING UP FROM CHAIR USING ARMS: A LITTLE
WALKING IN HOSPITAL ROOM: A LITTLE
DAILY ACTIVITIY SCORE: 24
MOVING FROM LYING ON BACK TO SITTING ON SIDE OF FLAT BED: A LITTLE
MOVING TO AND FROM BED TO CHAIR: A LITTLE
SUGGESTED CMS G CODE MODIFIER MOBILITY: CK

## 2018-05-05 ASSESSMENT — ENCOUNTER SYMPTOMS
FEVER: 0
COUGH: 0
MYALGIAS: 0
CHILLS: 0
SPUTUM PRODUCTION: 0
DEPRESSION: 0
DOUBLE VISION: 0
DIZZINESS: 0
BLURRED VISION: 0
HEARTBURN: 0
VOMITING: 0
CLAUDICATION: 0
WEAKNESS: 1
SPEECH CHANGE: 0
PALPITATIONS: 0
SHORTNESS OF BREATH: 0
BRUISES/BLEEDS EASILY: 0
HALLUCINATIONS: 0
TINGLING: 0
BLOOD IN STOOL: 0

## 2018-05-05 ASSESSMENT — PAIN SCALES - GENERAL
PAINLEVEL_OUTOF10: 7
PAINLEVEL_OUTOF10: 7
PAINLEVEL_OUTOF10: 5
PAINLEVEL_OUTOF10: 8
PAINLEVEL_OUTOF10: 7
PAINLEVEL_OUTOF10: 6

## 2018-05-05 NOTE — ED TRIAGE NOTES
"Chief Complaint   Patient presents with   • Bradycardia     Transfer from [place], pt initially had HR of 32, received 1mg atropine. Per report alternating between idioventricular jo and afib, which she has a hx of. Started on cardizem 2 wks ago for afib.    • Other     Hyperkalemia, received calcium gluconate, bicarb, d50, and 10 units regular insulin at OSH.      /69   Pulse (!) 103   Temp 36.4 °C (97.6 °F)   Resp 18   Ht 1.626 m (5' 4\")   Wt 66 kg (145 lb 8.1 oz)   BMI 24.98 kg/m²     Pt transferred from [place] by [facility] for above. EKG completed on arrival. Pt takes eliquis at home, as well as lasix and spironolactone.   "

## 2018-05-05 NOTE — ED NOTES
Med rec complete per pt at bedside   Pt states she has been on Cardizem 240mg QD for 2 weeks  Allergies reviewed  No oral ABX within last 30 days

## 2018-05-05 NOTE — H&P
Hospital Medicine History and Physical    Date of Service  5/5/2018    Chief Complaint  Chief Complaint   Patient presents with   • Bradycardia     Transfer from [place], pt initially had HR of 32, received 1mg atropine. Per report alternating between idioventricular jo and afib, which she has a hx of. Started on cardizem 2 wks ago for afib.    • Other     Hyperkalemia, received calcium gluconate, bicarb, d50, and 10 units regular insulin at OSH.        History of Presenting Illness  84 y.o. Female with history of paroxysmal A. fib who  transferred from outside facility/[facility] with paroxysmal A. fib and bradycardia.  Patient was seen 2 weeks ago by her cardiologist in [place], diltiazem was added to sotalol. Last 2 weeks afterwards she was feeling generalized weakness, lightheadedness, near syncopal. She was unable to find her pulse.  Yesterday's she went to emergency department at [facility] and found to be bradycardic at 33 bpm.  Her potassium was elevated at 5.4 and her sodium was low at 119 . That numbers improved to 4.0 and 131 respectively. She received bicarb, insulin, dextrose ,calcium gluconate, glucagon,  and normal saline bolus 1500 mL, as well as atropine once. Currently her heart rate is 116. She denies any complaints currently. A. fib with RVR QTc 579, LBBB  Patient was seen by Dr. Manuel and cardioversion was attempted, unsuccessfully.  Per Dr. Manuel, patient will probably need the pacemaker on Monday.       Primary Care Physician  Cee Ramos M.D.    Consultants  Cardiology    Code Status  Full code    Review of Systems  Review of Systems   Constitutional: Positive for malaise/fatigue. Negative for chills and fever.   HENT: Negative for ear discharge and hearing loss.    Eyes: Negative for blurred vision and double vision.   Respiratory: Negative for cough, sputum production and shortness of breath.    Cardiovascular: Negative for chest pain, palpitations, claudication and leg swelling.    Gastrointestinal: Negative for blood in stool, heartburn and vomiting.   Genitourinary: Positive for frequency. Negative for dysuria and urgency.   Musculoskeletal: Negative for joint pain and myalgias.   Skin: Negative for itching and rash.   Neurological: Positive for weakness. Negative for dizziness, tingling and speech change.   Endo/Heme/Allergies: Negative for environmental allergies. Does not bruise/bleed easily.   Psychiatric/Behavioral: Negative for depression and hallucinations.     Please see HPI, all other systems were reviewed and are negative (AMA/CMS criteria)       Past Medical History  Past Medical History:   Diagnosis Date   • Fall 12-   • A-fib (HCC)    • Arrhythmia    • Arthritis    • Back pain    • Hypertension        Surgical History  No past surgical history on file.    Medications  No current facility-administered medications on file prior to encounter.      Current Outpatient Prescriptions on File Prior to Encounter   Medication Sig Dispense Refill   • spironolactone (ALDACTONE) 25 MG Tab Take 1 Tab by mouth every day. 30 Tab 11   • omeprazole (PRILOSEC) 20 MG delayed-release capsule Take 20 mg by mouth every day.     • ELIQUIS 2.5 MG Tab Take 2.5 mg by mouth 2 Times a Day.     • fluticasone (FLONASE) 50 MCG/ACT nasal spray Spray 1 Spray in nose every day.     • ipratropium (ATROVENT) 0.06 % Solution Spray 1 Spray in nose every day.     • meloxicam (MOBIC) 7.5 MG TABS Take 7.5 mg by mouth every day.     • hydrocodone-acetaminophen (NORCO) 7.5-325 MG per tablet Take 1-2 Tabs by mouth every four hours as needed for Severe Pain.         Family History  Mother had hypertension. Father had heart disease, dyslipidemia    Social History  Social History   Substance Use Topics   • Smoking status: Former Smoker     Packs/day: 0.50     Types: Cigarettes     Quit date: 1/1/1989   • Smokeless tobacco: Never Used   • Alcohol use Yes      Comment: occasional glass of wine  "      Allergies  Allergies   Allergen Reactions   • Amiodarone      Atrial fibrillation..organ prob's   • Sulfa Drugs      Rash, chills, fever   • Augmentin Nausea        Physical Exam  Laboratory   Hemodynamics  Temp (24hrs), Av.4 °C (97.6 °F), Min:36.4 °C (97.6 °F), Max:36.4 °C (97.6 °F)   Temperature: 36.4 °C (97.6 °F)  Pulse  Av.8  Min: 49  Max: 130 Heart Rate (Monitored): (!) 124  Blood Pressure : 125/81, NIBP: 152/101      Respiratory      Respiration: 19, Pulse Oximetry: 95 %             Physical Exam  Vitals/ General Appearance:   Weight/BMI: Body mass index is 24.98 kg/m².  Blood pressure 125/81, pulse (!) 56, temperature 36.4 °C (97.6 °F), resp. rate 19, height 1.626 m (5' 4\"), weight 66 kg (145 lb 8.1 oz), SpO2 95 %. Vitals:    18 0757 18 0800 18 0815 18 0900   BP: 125/81      Pulse: (!) 120 (!) 114 80 (!) 56   Resp: 16 19     Temp:       SpO2: 99% 100% 92% 95%   Weight:       Height:        Oxygen Therapy:  Pulse Oximetry: 95 %, O2 (LPM): 1    Constitutional:  In no acute distress.  HENMT: Normocephalic, atraumatic, b/l ears normal, nose normal  Eyes:  EOMI, conjunctiva normal, no discharge  Neck: no tracheal deviation, supple  Cardiovascular: tachycardic,  Rhythm irregular, no murmurs, no rubs or gallops; no cyanosis, clubbing or edema  Lungs: Respiratory effort is normal, normal breath sounds, breath sounds clear to auscultation b/l, no rales, rhonchi or wheezing  Abdomen: soft, non-tender, no guarding or rebound  Skin: warm, dry, no erythema, no rash  Neurologic: Alert and oriented, no focal deficits, CN II-XII normal  Psychiatric: No anxiety or depression    Recent Labs      18   0530   WBC  12.6*   RBC  4.68   HEMOGLOBIN  14.3   HEMATOCRIT  40.1   MCV  85.7   MCH  30.6   MCHC  35.7*   RDW  42.3   PLATELETCT  279   MPV  8.6*     Recent Labs      18   0530   SODIUM  133*   POTASSIUM  3.8   CHLORIDE  95*   CO2  30   GLUCOSE  68   BUN  27*   CREATININE  0.92 "   CALCIUM  10.0     Recent Labs      05/05/18 0530   ALTSGPT  25   ASTSGOT  18   ALKPHOSPHAT  72   TBILIRUBIN  0.8   GLUCOSE  68     Recent Labs      05/05/18 0530   APTT  30.7   INR  1.07     Recent Labs      05/05/18 0530   BNPBTYPENAT  584*         Lab Results   Component Value Date    TROPONINI 0.01 05/05/2018     Urinalysis:    Lab Results  Component Value Date/Time   SPECGRAVITY 1.011 05/05/2018 0612   GLUCOSEUR 250 (A) 05/05/2018 0612   KETONES Negative 05/05/2018 0612   NITRITE Negative 05/05/2018 0612   WBCURINE 2-5 05/05/2018 0612   RBCURINE 2-5 (A) 05/05/2018 0612   BACTERIA Negative 05/05/2018 0612   EPITHELCELL Few 05/05/2018 0612        Imaging  OUTSIDE IMAGES-DX CHEST   Final Result         Assessment/Plan     I anticipate this patient will require at least two midnights for appropriate medical management, necessitating inpatient admission.    Paroxysmal atrial fibrillation (HCC)- (present on admission)   Assessment & Plan    Initially presented to outside facility with symptomatic bradycardia , likely secondary to sotalol and diltiazem  Cardioversion attempted in the ER, unsuccessfully  Continue sotalol and beta blocker metoprolol for now per cardiology recommendation. Possible pacemaker placement on Monday  Monitor on telemetry.  Continue apixiban for stroke prevention        Sepsis (HCC)   Assessment & Plan    This is sepsis (without associated acute organ dysfunction).   Source of infection was probably UTI  Continue ceftriaxone. She received fluids at outside facility.  Follow blood and urine culture        Chronic anticoagulation   Assessment & Plan    No signs of bleeding. Continue home medication for stroke prevention Apixiban        Hyperkalemia   Assessment & Plan    That was treated at outside facility with calcium gluconate, insulin, dextrose, bicarb and it was resolved        HTN (hypertension)- (present on admission)   Assessment & Plan    Continue metoprolol. Monitor blood  pressure            Prophylaxis: apixaban       I spent 80 minutes evaluating Jeny Bennett, reviewing the chart, vitals, labs and imaging, discussing the case with ED physician, medication reconciliation, placing orders and enacting the plan above.    Sections of this note have been dictated using Dragon Speech recognition software. While care and attention has been placed to ensure the accuracy of this note, there can be occasional typographical errors that may be missed and I apologize if this situation occurs. Please take these errors into context. If questions occur please do not hesitate to call or notify the author of this note for clarification.

## 2018-05-05 NOTE — ED PROVIDER NOTES
ED Provider Note    CHIEF COMPLAINT  Chief Complaint   Patient presents with   • Bradycardia     Transfer from [place], pt initially had HR of 32, received 1mg atropine. Per report alternating between idioventricular jo and afib, which she has a hx of. Started on cardizem 2 wks ago for afib.    • Other     Hyperkalemia, received calcium gluconate, bicarb, d50, and 10 units regular insulin at OSH.        HPI    Primary care provider: Cee Ramos M.D.  Means of arrival: EMS transfer from [place]  History obtained from: Patient, medical records, EMS  History limited by: Nothing    Jeny Bennett is a 84 y.o. female who presents with concerns for bradycardia. The patient presented to her local hospital in [facility] with complaints of generalized weakness, fatigue, and near syncope for nearly a day. On arrival there, she was noted to have a heart rate in the 20s to 30s, she had a potassium of 5.4, and the patient was aggressively treated for hyperkalemia. She received dextrose, insulin, IV fluids, and was transferred here for a higher level of care as there is no cardiology services available at that facility. The patient has a long history of paroxysmal atrial fibrillation that's been difficult to control as well as edema. She was started on diltiazem 2 weeks ago after an episode of atrial fibrillation with RVR. She's had baseline rhythm control with sotalol for several years, no dose changes to that medication recently. She has had multiple changes to her diuretic regimen recently, currently on lisinopril as well as furosemide and spironolactone. On arrival here, the patient is complaint free aside from some mild fatigue. She has no chest pain, palpitations, fevers or chills, nausea vomiting or diaphoresis, or any other associated symptoms. She feels much better after interventions taken at the outside hospital. She's had several prior episodes that he felt like this. Most recently her episode of A. fib  with RVR 2 weeks ago. Started on diltiazem at that time, a new med, at 240mg/day.    REVIEW OF SYSTEMS  Constitutional: Negative for fever or chills. Positive for fatigue which is improving.  HENT: Negative for nosebleeds or sore throat.    Eyes: Negative for vision changes or discharge.   Respiratory: Negative for cough or shortness of breath.    Cardiovascular: Negative for chest pain or palpitations. She had near syncope earlier today.  Gastrointestinal: Negative for nausea, vomiting, abdominal pain.   Genitourinary: Negative for dysuria or flank pain.   Musculoskeletal: Negative for new back pain (she does have chronic back pain) or joint pain.   Neurological: Negative for sensory or motor changes.   See HPI for further details. All other systems are negative.     PAST MEDICAL HISTORY   has a past medical history of A-fib (HCC); Arrhythmia; Arthritis; Back pain; Fall (12-); Hypertension; and Sepsis ([facility]) (5/5/2018).    PAST FAMILY HISTORY  Family History   Problem Relation Age of Onset   • Arthritis Mother    • Cancer Mother    • Hypertension Mother    • Hypertension Father    • Heart Disease Father    • Hyperlipidemia Father    • Lung Disease Sister    • Hypertension Sister        SOCIAL HISTORY  Social History     Social History Main Topics   • Smoking status: Former Smoker     Packs/day: 0.50     Types: Cigarettes     Quit date: 1/1/1989   • Smokeless tobacco: Never Used   • Alcohol use Yes      Comment: occasional glass of wine   • Drug use: No   • Sexual activity: Not on file       SURGICAL HISTORY  patient denies any surgical history    CURRENT MEDICATIONS  Home Medications     Reviewed by Moira Goodrich, Pharmacy Intern (Pharmacy Intern) on 05/05/18 at 0822  Med List Status: Complete   Medication Last Dose Status   DILTIAZem CD (CARDIZEM CD) 240 MG CAPSULE SR 24 HR 5/3/2018 Active   ELIQUIS 2.5 MG Tab 5/4/2018 Active   fluticasone (FLONASE) 50 MCG/ACT nasal spray 5/4/2018 Active  "  hydrocodone-acetaminophen (NORCO) 7.5-325 MG per tablet 5/4/2018 Active   ipratropium (ATROVENT) 0.06 % Solution 5/4/2018 Active   meloxicam (MOBIC) 7.5 MG TABS 5/4/2018 Active   omeprazole (PRILOSEC) 20 MG delayed-release capsule 5/4/2018 Active   sotalol (BETAPACE) 80 MG Tab 5/4/2018 Active   spironolactone (ALDACTONE) 25 MG Tab 5/4/2018 Active                ALLERGIES  Allergies   Allergen Reactions   • Amiodarone      Atrial fibrillation..organ prob's   • Sulfa Drugs      Rash, chills, fever   • Augmentin Nausea       PHYSICAL EXAM  VITAL SIGNS: /72   Pulse (!) 110   Temp 36.4 °C (97.6 °F)   Resp 18   Ht 1.626 m (5' 4\")   Wt 64.7 kg (142 lb 10.2 oz)   SpO2 93%   BMI 24.48 kg/m²    Pulse ox interpretation: On room air, I interpret this pulse ox as normal.  Constitutional: Age-appropriate, mild distress.  HEENT: Normocephalic, atraumatic. Posterior pharynx clear, mucous membranes dry.  Eyes:  EOMI. Normal sclera. Pale conjunctivae.  Neck: Supple, Full range of motion, nontender.  Chest/Pulmonary: Clear to ausculation bilaterally, no wheezes or rhonchi.  Cardiovascular: Irregularly irregular fast rhythm.  Abdomen: Soft, nontender, no rebound, guarding, or masses.  Back: No CVA tenderness, nontender midline, no step offs.  Musculoskeletal: No deformity, 2+ bilateral lower extremity edema.  Neuro: Clear speech, normal coordination, cranial nerves II-XII grossly intact.  Psych: Normal mood and affect.  Skin: No rashes, warm and dry.    DIAGNOSTIC STUDIES / PROCEDURES    LABS & EKG  Results for orders placed or performed during the hospital encounter of 05/05/18   CBC w/ Differential   Result Value Ref Range    WBC 12.6 (H) 4.8 - 10.8 K/uL    RBC 4.68 4.20 - 5.40 M/uL    Hemoglobin 14.3 12.0 - 16.0 g/dL    Hematocrit 40.1 37.0 - 47.0 %    MCV 85.7 81.4 - 97.8 fL    MCH 30.6 27.0 - 33.0 pg    MCHC 35.7 (H) 33.6 - 35.0 g/dL    RDW 42.3 35.9 - 50.0 fL    Platelet Count 279 164 - 446 K/uL    MPV 8.6 (L) 9.0 " - 12.9 fL    Neutrophils-Polys 81.30 (H) 44.00 - 72.00 %    Lymphocytes 7.90 (L) 22.00 - 41.00 %    Monocytes 9.90 0.00 - 13.40 %    Eosinophils 0.20 0.00 - 6.90 %    Basophils 0.20 0.00 - 1.80 %    Immature Granulocytes 0.50 0.00 - 0.90 %    Nucleated RBC 0.00 /100 WBC    Neutrophils (Absolute) 10.26 (H) 2.00 - 7.15 K/uL    Lymphs (Absolute) 1.00 1.00 - 4.80 K/uL    Monos (Absolute) 1.25 (H) 0.00 - 0.85 K/uL    Eos (Absolute) 0.02 0.00 - 0.51 K/uL    Baso (Absolute) 0.03 0.00 - 0.12 K/uL    Immature Granulocytes (abs) 0.06 0.00 - 0.11 K/uL    NRBC (Absolute) 0.00 K/uL   Complete Metabolic Panel (CMP)   Result Value Ref Range    Sodium 133 (L) 135 - 145 mmol/L    Potassium 3.8 3.6 - 5.5 mmol/L    Chloride 95 (L) 96 - 112 mmol/L    Co2 30 20 - 33 mmol/L    Anion Gap 8.0 0.0 - 11.9    Glucose 68 65 - 99 mg/dL    Bun 27 (H) 8 - 22 mg/dL    Creatinine 0.92 0.50 - 1.40 mg/dL    Calcium 10.0 8.5 - 10.5 mg/dL    AST(SGOT) 18 12 - 45 U/L    ALT(SGPT) 25 2 - 50 U/L    Alkaline Phosphatase 72 30 - 99 U/L    Total Bilirubin 0.8 0.1 - 1.5 mg/dL    Albumin 4.0 3.2 - 4.9 g/dL    Total Protein 6.8 6.0 - 8.2 g/dL    Globulin 2.8 1.9 - 3.5 g/dL    A-G Ratio 1.4 g/dL   Btype Natriuretic Peptide   Result Value Ref Range    B Natriuretic Peptide 584 (H) 0 - 100 pg/mL   Troponin STAT   Result Value Ref Range    Troponin I 0.01 0.00 - 0.04 ng/mL   Magnesium   Result Value Ref Range    Magnesium 1.8 1.5 - 2.5 mg/dL   Phosphorus   Result Value Ref Range    Phosphorus 2.5 2.5 - 4.5 mg/dL   LACTIC ACID   Result Value Ref Range    Lactic Acid 2.1 (H) 0.5 - 2.0 mmol/L   PT/INR   Result Value Ref Range    PT 13.6 12.0 - 14.6 sec    INR 1.07 0.87 - 1.13   APTT   Result Value Ref Range    APTT 30.7 24.7 - 36.0 sec   TSH (for screening thyroid dysfunction)   Result Value Ref Range    TSH 1.730 0.380 - 5.330 uIU/mL   Free Thyroxine (add to TSH for patients with existing thyroid dysfunction)   Result Value Ref Range    Free T-4 1.45 (H) 0.53 - 1.43  ng/dL   Urinalysis, culture if indicated   Result Value Ref Range    Color Yellow     Character Clear     Specific Gravity 1.011 <1.035    Ph >=9.0 (A) 5.0 - 8.0    Glucose 250 (A) Negative mg/dL    Ketones Negative Negative mg/dL    Protein Negative Negative mg/dL    Bilirubin Negative Negative    Urobilinogen, Urine 0.2 Negative    Nitrite Negative Negative    Leukocyte Esterase Trace (A) Negative    Occult Blood Small (A) Negative    Micro Urine Req Microscopic    DIGOXIN   Result Value Ref Range    Digoxin 0.1 (L) 0.8 - 2.0 ng/mL   URINE MICROSCOPIC (W/UA)   Result Value Ref Range    WBC 2-5 /hpf    RBC 2-5 (A) /hpf    Bacteria Negative None /hpf    Epithelial Cells Few /hpf    Hyaline Cast 0-2 /lpf   ESTIMATED GFR   Result Value Ref Range    GFR If African American >60 >60 mL/min/1.73 m 2    GFR If Non African American 58 (A) >60 mL/min/1.73 m 2   ACCU-CHEK GLUCOSE   Result Value Ref Range    Glucose - Accu-Ck 77 65 - 99 mg/dL   EKG (ER)   Result Value Ref Range    Report       [facility]    Test Date:  2018  Pt Name:    JAHAIRA GARCIA           Department: ER  MRN:        [id number]                      Room:       [facility]  Gender:     Female                       Technician: [id number]  :        [id number]                   Requested By:ER TRIAGE PROTOCOL  Order #:    [id number]                    Reading MD: Royal Garcia MD    Measurements  Intervals                                Axis  Rate:       97                           P:  DC:                                      QRS:        -45  QRSD:       154                          T:          105  QT:         424  QTc:        539    Interpretive Statements  ATRIAL FIBRILLATION  LEFT BUNDLE BRANCH BLOCK  Compared to ECG 12/15/2016 10:26:28  Left bundle-branch block now present  Sinus rhythm no longer present  Atrial premature complex(es) no longer present    Electronically Signed On 2018 7:34:37 PDT by Royal  MD Jose           RADIOLOGY  OUTSIDE IMAGES-DX CHEST   Final Result          PROCEDURES  Conscious Sedation Procedure Note    Indication: cardioversion    Consent: I have discussed with the patient the indication, alternatives, and the possible risks and/or complications of the planned procedure and the anesthesia methods. The patient  appears to understand and agree to proceed.    Physician Involvement: I, the attending physician was present for this entire procedure.     Pre-Sedation Documentation and Exam: I have personally completed a history, physical exam & review of systems for this patient (see notes).  Vital signs have been reviewed (see flow sheet for vitals).  I have reviewed the patient's history and review of systems.  Lungs: clear and no crackles or wheezing, Cardiovascular: irregular rate and irregular, fast rhythm, no murmurs  Airway Assessment: Mallampati Class II - (soft palate, fauces & uvula are visible)    Prior History of Anesthesia Complications: none    ASA Classification: Class 3 - A patient with severe systemic disease that limits activity but is not incapacitating    Sedation/ Anesthesia Plan: intravenous sedation    Medications Used: etomidate 8mg intravenously    Monitoring and Safety: The patient was placed on a cardiac monitor and vital signs, pulse oximetry and level of consciousness were continuously evaluated throughout the procedure. The patient was closely monitored until recovery from the medications was complete and the patient had returned to baseline status. Respiratory therapy was on standby at all times during the procedure.    (The following sections must be completed)  Post-Sedation Vital Signs: Vital signs were reviewed and were stable after the procedure (see flow sheet for vitals)            Post-Sedation Exam: Lungs: clear and no crackles or wheezing, CV: rate still irregularly irregular           Complications: none, but sadly unsuccessful attempts x2 at  cardioversion by cardiologist. Total time in room for procedure 15 minutes.     COURSE & MEDICAL DECISION MAKING    This is a 84 y.o. female who presents with bradycardia, near syncope, hyperK, treated at outside hospital now with afbi with rvr.    Differential Diagnosis includes but is not limited to:  Drug reaction, overdose, renal failure, electrolyte abnormality, dysrhythmia, acs    ED Course:  Plan ekg, pads, crash cart to room, labs, cards consult, close monitoring. On arrival afib with rvr, LBBB, but stable blood pressure.  Reviewed prior charts, h/o parox a fib, recently added CCB to sotalol.   Trop neg, doubt acute acs, given hours of symptoms.  Dig level negative doubt dig toxicity.   cxr reviewed from OSH w/o acute process.  Rate rising, given concern for beta blocker toxicity/overdose/poor reaction, cardiology consulted for further advice.     6:48 AM: Consulted cardiologist Dr. Manuel who will graciously evaluate the patient. I requested advice on what to do for rate control is her heart rate is climbing now and still in atrial fibrillation with rapid ventricular response. Given that she is on sotalol metoprolol should be a safe agent, I will proceed with an initial low dose of metoprolol to trial rate control. Her blood pressures currently stable prior to administration of this medication.    Minimal effect with 2.5mg metoprolol. Afib with RVR, bp stable but rate climbing and bp slowly dropping.  Cards to bedside, rate now 130s+, concern for instability, given she's anticoagulated plan emergent cardioversion. Patient verbally consented, and I performed moderate sedation while Dr. Manuel performed 2 attempts at dc cardioversion, see mine and his procedure notes. Unsuccessful attempt to cardiovert electrically x2.     D/w hospitalist, who will kindly admit to ICU for further treatment. Ordered home sotalol, after discussion with pharmacy will also treat with magnesium and replete K PO.     Upon my  evaluation, this patient had a high probability of imminent or life-threatening deterioration due to atrial fibrillation with rapid ventricular response. Stable for admit to icu in critical condition.    I personally provided 33 minutes of total critical care time outside of time spent on separately billable/documented procedures. This required my direct attention, intervention, and management which included the following:  -review of laboratory data  -review of radiology studies  -discussion with consultants: pharmacy, hospitalist, cardiologist  -discussion with patient  -monitoring for potential decompensation  -IV parenteral rate control meds  -IV electrolyte repletion      Medications   sotalol (BETAPACE) tablet 80 mg (80 mg Oral Given 5/5/18 1001)   Metoprolol Tartrate (LOPRESSOR) injection 5 mg (0 mg Intravenous Held 5/5/18 0831)   metoprolol (LOPRESSOR) tablet 25 mg (25 mg Oral Given 5/5/18 0833)   cefTRIAXone (ROCEPHIN) syringe 1 g (1 g Intravenous Given 5/5/18 1355)   senna-docusate (PERICOLACE or SENOKOT S) 8.6-50 MG per tablet 2 Tab (not administered)     And   polyethylene glycol/lytes (MIRALAX) PACKET 1 Packet (not administered)     And   magnesium hydroxide (MILK OF MAGNESIA) suspension 30 mL (not administered)     And   bisacodyl (DULCOLAX) suppository 10 mg (not administered)   NS (BOLUS) infusion 500 mL (not administered)   apixaban (ELIQUIS) tablet 5 mg (5 mg Oral Given 5/5/18 1124)   HYDROcodone-acetaminophen (NORCO) 7.5-325 MG per tablet 1-2 Tab (1 Tab Oral Given 5/5/18 1154)   ipratropium (ATROVENT) 0.06 % nasal spray 1 Spray (1 Spray Nasal Refused 5/5/18 0930)   meloxicam (MOBIC) tablet 7.5 mg (7.5 mg Oral Given 5/5/18 1355)   omeprazole (PRILOSEC) capsule 20 mg (20 mg Oral Given 5/5/18 1124)   Metoprolol Tartrate (LOPRESSOR) injection 2.5 mg (2.5 mg Intravenous Given 5/5/18 0652)   magnesium sulfate IVPB premix 4 g (0 g Intravenous Stopped 5/5/18 0958)   potassium chloride SA (Kdur) tablet 40  mEq (40 mEq Oral Given 5/5/18 5569)   etomidate (AMIDATE) injection (8 mg Intravenous Given 5/5/18 9318)     FINAL IMPRESSION  1. Paroxysmal atrial fibrillation (HCC)    2. Bradycardia    3. Atrial fibrillation with RVR (HCC)    4. Essential hypertension    5. High risk medication use      -ADMIT-     Results, exam findings, clinical impression, presumed diagnosis, treatment options, and need for admission were discussed with the patient, and they verbalized understanding, agreed with, and appreciated the plan of care.    Pertinent Labs & Imaging studies reviewed and verified by myself, as well as nursing notes and the patient's past medical, family, and social histories (See chart for details).    Portions of this record were made with voice recognition software.  Despite my review, spelling/grammar/context errors may still remain.  Interpretation of this chart should be taken in this context.    Electronically signed by Royal Garcia on 5/5/2018 at 2:07 PM.

## 2018-05-05 NOTE — ASSESSMENT & PLAN NOTE
That was treated at outside facility with calcium gluconate, insulin, dextrose, bicarb and it was resolved  Will monitor patient on telemetry  Daily K monitoring  Holding aldactone, BP is low normal

## 2018-05-05 NOTE — ASSESSMENT & PLAN NOTE
Initially presented to outside facility with symptomatic bradycardia, likely secondary to sotalol and diltiazem combination.   Cardioversion attempted in the ER, unsuccessful  Cardiology team on board  S/P PM placement on 5/7  Continue Diltiazem 60 q6h and metoprolol 100 BID at this time per cardiology team  Continue AC with Eliquis  Continue telemetry monitoring  Further recommendations per cardiology team.  Will ambulate to ensure rate stays less than 110.  Interrogate pacemaker in a.m.

## 2018-05-05 NOTE — CARE PLAN
Problem: Communication  Goal: The ability to communicate needs accurately and effectively will improve  Outcome: PROGRESSING AS EXPECTED  Discussed POC and medications with patient. Pt verbalized understanding.      Problem: Pain Management  Goal: Pain level will decrease to patient's comfort goal  Outcome: PROGRESSING AS EXPECTED  Pt has chronic back pain.  Pt administered medication as needed per MAR.

## 2018-05-05 NOTE — PROGRESS NOTES
2 RN skin check completed with ANABELLE Bartholomew:    Pt ears red from glasses but blanching.  Pt has generalized bruising throughout whole body.  Pt left arm is bruised and swollen.  Pt states it happened after her IV went bad.  Pt sacrum is intact, pink, and blanching.  Bilateral legs have discoloration and scattered bruising and scabbing.  Heels are boggy.  Pillows used to float heels.

## 2018-05-05 NOTE — ASSESSMENT & PLAN NOTE
Resolved   Bradycardia / tachycardia 2/2 cardiac conditions  Leukocytosis may be stress related from underlying cardiac issues, however elevated procalcitonin may suggest that uti was causing symptoms.   Completed treatment with empiric ceftriaxone x 3 days for uncomplicated cystitis  Will continue close clinical monitoring  Procalcitonin has normalized

## 2018-05-05 NOTE — PROGRESS NOTES
Bedside report received. Patient A&O x4. 1L NC. Monitor room called. Pt CAROL Andrew on the monitor. Complains of pain 7/10 in back medicated per MAR. POC discussed with patient. Patient verbalized understanding. Call light and belongings within reach. Bed locked and in lowest position, alarm and fall precautions in place.

## 2018-05-05 NOTE — ED NOTES
ERP and cardiologist bedside for cardioversion. See sedation narrator. Pt connected to monitor, on 1 L NC.  Pt received 8mg etomidate, 100J synced cardioversion at 0755, 200J synced cardioversion at 0756.

## 2018-05-05 NOTE — PROCEDURES
Procedure performed: External Direct Current Cardioversion    : Eduardo Manuel MD PhD FAC    Assistant: None    Anesthesia:     Indication: Atrial Fibrillation    Preprocedural Diagnosis:   Atrial Fibrillation  Postprocedural Diagnosis: Atrial Fibrillation      Description of procedure:  The patient was in the emergency room. Informed consent was obtained. Defibrillator pads were placed in the anterior and posterior position.  Adequate sedation was obtained with assistance of Dr Garcia. The patient was unsuccessfully cardioverted with both an attempt 100J and 200J synchronized biphasic energy. She was monitored in the ER    Conclusion: UNSuccessful DC cardioversion with 2 attempts    Complications: None    Plan on EP eval and likely PPM AVJ on Monday, continue sotalol but hold dilt will add metop to see if better HR control but up to 130s is reasonable until Monday    Electronically signed: Eduardo Manuel MD PhD [facility]  Cardiologist [facility]

## 2018-05-06 ENCOUNTER — APPOINTMENT (OUTPATIENT)
Dept: RADIOLOGY | Facility: MEDICAL CENTER | Age: 83
DRG: 243 | End: 2018-05-06
Attending: INTERNAL MEDICINE
Payer: MEDICARE

## 2018-05-06 PROBLEM — Z79.01 CHRONIC ANTICOAGULATION: Status: RESOLVED | Noted: 2018-05-05 | Resolved: 2018-05-06

## 2018-05-06 PROBLEM — K21.9 GERD (GASTROESOPHAGEAL REFLUX DISEASE): Status: ACTIVE | Noted: 2018-05-06

## 2018-05-06 PROBLEM — N30.90 CYSTITIS: Status: ACTIVE | Noted: 2018-05-06

## 2018-05-06 PROBLEM — D72.829 LEUKOCYTOSIS: Status: ACTIVE | Noted: 2018-05-06

## 2018-05-06 LAB
ANION GAP SERPL CALC-SCNC: 8 MMOL/L (ref 0–11.9)
BUN SERPL-MCNC: 23 MG/DL (ref 8–22)
CALCIUM SERPL-MCNC: 8.7 MG/DL (ref 8.5–10.5)
CHLORIDE SERPL-SCNC: 98 MMOL/L (ref 96–112)
CO2 SERPL-SCNC: 27 MMOL/L (ref 20–33)
CREAT SERPL-MCNC: 0.9 MG/DL (ref 0.5–1.4)
GLUCOSE SERPL-MCNC: 109 MG/DL (ref 65–99)
MAGNESIUM SERPL-MCNC: 2.2 MG/DL (ref 1.5–2.5)
POTASSIUM SERPL-SCNC: 3.8 MMOL/L (ref 3.6–5.5)
PROCALCITONIN SERPL-MCNC: 0.78 NG/ML
SODIUM SERPL-SCNC: 133 MMOL/L (ref 135–145)
T3FREE SERPL-MCNC: 2.94 PG/ML (ref 2.4–4.2)
T4 FREE SERPL-MCNC: 1.39 NG/DL (ref 0.53–1.43)
TSH SERPL DL<=0.005 MIU/L-ACNC: 1.04 UIU/ML (ref 0.38–5.33)

## 2018-05-06 PROCEDURE — A9270 NON-COVERED ITEM OR SERVICE: HCPCS | Performed by: INTERNAL MEDICINE

## 2018-05-06 PROCEDURE — 84443 ASSAY THYROID STIM HORMONE: CPT

## 2018-05-06 PROCEDURE — 84145 PROCALCITONIN (PCT): CPT

## 2018-05-06 PROCEDURE — 80048 BASIC METABOLIC PNL TOTAL CA: CPT

## 2018-05-06 PROCEDURE — 87040 BLOOD CULTURE FOR BACTERIA: CPT | Mod: 91

## 2018-05-06 PROCEDURE — 84439 ASSAY OF FREE THYROXINE: CPT

## 2018-05-06 PROCEDURE — 700102 HCHG RX REV CODE 250 W/ 637 OVERRIDE(OP): Performed by: INTERNAL MEDICINE

## 2018-05-06 PROCEDURE — 36415 COLL VENOUS BLD VENIPUNCTURE: CPT

## 2018-05-06 PROCEDURE — 700111 HCHG RX REV CODE 636 W/ 250 OVERRIDE (IP): Performed by: INTERNAL MEDICINE

## 2018-05-06 PROCEDURE — 83735 ASSAY OF MAGNESIUM: CPT

## 2018-05-06 PROCEDURE — 770020 HCHG ROOM/CARE - TELE (206)

## 2018-05-06 PROCEDURE — 71045 X-RAY EXAM CHEST 1 VIEW: CPT

## 2018-05-06 PROCEDURE — 99233 SBSQ HOSP IP/OBS HIGH 50: CPT | Performed by: INTERNAL MEDICINE

## 2018-05-06 PROCEDURE — 84481 FREE ASSAY (FT-3): CPT

## 2018-05-06 RX ORDER — L. ACIDOPHILUS/L.BULGARICUS 100MM CELL
1 GRANULES IN PACKET (EA) ORAL
Status: DISCONTINUED | OUTPATIENT
Start: 2018-05-06 | End: 2018-05-11 | Stop reason: HOSPADM

## 2018-05-06 RX ORDER — METOPROLOL TARTRATE 50 MG/1
50 TABLET, FILM COATED ORAL TWICE DAILY
Status: DISCONTINUED | OUTPATIENT
Start: 2018-05-06 | End: 2018-05-08

## 2018-05-06 RX ADMIN — METOPROLOL TARTRATE 50 MG: 50 TABLET, FILM COATED ORAL at 17:15

## 2018-05-06 RX ADMIN — HYDROCODONE BITARTRATE AND ACETAMINOPHEN 1 TABLET: 7.5; 325 TABLET ORAL at 20:34

## 2018-05-06 RX ADMIN — METOPROLOL TARTRATE 25 MG: 25 TABLET, FILM COATED ORAL at 05:35

## 2018-05-06 RX ADMIN — APIXABAN 5 MG: 5 TABLET, FILM COATED ORAL at 05:35

## 2018-05-06 RX ADMIN — MELOXICAM 7.5 MG: 7.5 TABLET ORAL at 05:35

## 2018-05-06 RX ADMIN — OMEPRAZOLE 20 MG: 20 CAPSULE, DELAYED RELEASE ORAL at 05:35

## 2018-05-06 RX ADMIN — STANDARDIZED SENNA CONCENTRATE AND DOCUSATE SODIUM 2 TABLET: 8.6; 5 TABLET, FILM COATED ORAL at 05:36

## 2018-05-06 RX ADMIN — METOPROLOL TARTRATE 25 MG: 25 TABLET, FILM COATED ORAL at 13:57

## 2018-05-06 RX ADMIN — HYDROCODONE BITARTRATE AND ACETAMINOPHEN 1 TABLET: 7.5; 325 TABLET ORAL at 14:02

## 2018-05-06 RX ADMIN — LACTOBACILLUS ACIDOPHILUS / LACTOBACILLUS BULGARICUS 1 PACKET: 100 MILLION CFU STRENGTH GRANULES at 13:57

## 2018-05-06 RX ADMIN — CEFTRIAXONE SODIUM 1 G: 10 INJECTION, POWDER, FOR SOLUTION INTRAVENOUS at 05:32

## 2018-05-06 RX ADMIN — SOTALOL HYDROCHLORIDE 80 MG: 80 TABLET ORAL at 17:15

## 2018-05-06 RX ADMIN — SOTALOL HYDROCHLORIDE 80 MG: 80 TABLET ORAL at 05:35

## 2018-05-06 RX ADMIN — LACTOBACILLUS ACIDOPHILUS / LACTOBACILLUS BULGARICUS 1 PACKET: 100 MILLION CFU STRENGTH GRANULES at 17:16

## 2018-05-06 ASSESSMENT — ENCOUNTER SYMPTOMS
HALLUCINATIONS: 0
PALPITATIONS: 0
FALLS: 0
TINGLING: 0
FOCAL WEAKNESS: 0
NECK PAIN: 0
MYALGIAS: 0
NAUSEA: 0
SPEECH CHANGE: 0
HEARTBURN: 0
CONSTIPATION: 0
INSOMNIA: 0
BLOOD IN STOOL: 0
DOUBLE VISION: 0
CHILLS: 0
WHEEZING: 0
LOSS OF CONSCIOUSNESS: 0
VOMITING: 0
ORTHOPNEA: 0
DIARRHEA: 0
TREMORS: 0
DIZZINESS: 0
FEVER: 0
SHORTNESS OF BREATH: 0
MEMORY LOSS: 0
SEIZURES: 0
WEAKNESS: 1
DIAPHORESIS: 0
PND: 0
ABDOMINAL PAIN: 0
FLANK PAIN: 0
BLURRED VISION: 0
COUGH: 0
BACK PAIN: 1
DEPRESSION: 0
CLAUDICATION: 0
HEMOPTYSIS: 0
NERVOUS/ANXIOUS: 0
HEADACHES: 0
SPUTUM PRODUCTION: 0
SENSORY CHANGE: 0

## 2018-05-06 ASSESSMENT — PAIN SCALES - GENERAL
PAINLEVEL_OUTOF10: 0
PAINLEVEL_OUTOF10: 3
PAINLEVEL_OUTOF10: 4
PAINLEVEL_OUTOF10: 0
PAINLEVEL_OUTOF10: 4
PAINLEVEL_OUTOF10: 2
PAINLEVEL_OUTOF10: 4
PAINLEVEL_OUTOF10: 8
PAINLEVEL_OUTOF10: 5
PAINLEVEL_OUTOF10: 5

## 2018-05-06 ASSESSMENT — LIFESTYLE VARIABLES: SUBSTANCE_ABUSE: 0

## 2018-05-06 NOTE — ASSESSMENT & PLAN NOTE
Stopped PPI while empiric abx used during hospital stay to limit risk of C Diff  Lactobacillus  Resume PPI

## 2018-05-06 NOTE — PROGRESS NOTES
Cardiology Progress Note               Author: Ronni Sanabria Date & Time created: 2018  10:27 AM     Consultation for bradycardia/junctional, heart rate 20-32 with LBBB, received atropine in [place], now RVR s/p unsuccessful DCCV x2 in ER         Presented with, transferred from outside facility/[place] with PAF/bradycardia        History of paroxysmal A. Fib on sotalol, on Eliquis, hypertension, back pain, arthritis      /74  HR tachy jo       Labs reviewed    Sodium 133  Potassium 3.8  Creatinine 0.90  Magnesium 2.2      Review of Systems   Respiratory: Negative for cough and shortness of breath.    Cardiovascular: Negative for chest pain, palpitations, orthopnea, claudication, leg swelling and PND.       Physical Exam   Constitutional: She is oriented to person, place, and time. She appears well-developed.   HENT:   Head: Normocephalic.   Neck: No JVD present. No thyromegaly present.   Cardiovascular: An irregularly irregular rhythm present. Tachycardia present.    Pulses:       Carotid pulses are 2+ on the right side, and 2+ on the left side.       Radial pulses are 2+ on the right side, and 2+ on the left side.   Pulmonary/Chest: She has no wheezes.   Abdominal: Soft.   Musculoskeletal: She exhibits no edema.   Neurological: She is alert and oriented to person, place, and time.   Skin: Skin is warm and dry.       Hemodynamics:  Temp (24hrs), Av.6 °C (97.8 °F), Min:36.1 °C (97 °F), Max:36.9 °C (98.5 °F)  Temperature: 36.8 °C (98.2 °F)  Pulse  Av.8  Min: 49  Max: 130Heart Rate (Monitored): (!) 135  Blood Pressure : 114/74     Respiratory:    Respiration: 16, Pulse Oximetry: 95 %     Work Of Breathing / Effort: Mild  RUL Breath Sounds: Clear, RML Breath Sounds: Diminished, RLL Breath Sounds: Diminished, MARINA Breath Sounds: Clear, LLL Breath Sounds: Diminished  Fluids:     Weight: 64 kg (141 lb 1.5 oz)  GI/Nutrition:  Orders Placed This Encounter   Procedures   • Diet Order     Standing  Status:   Standing     Number of Occurrences:   1     Order Specific Question:   Diet:     Answer:   Cardiac [6]   • DIET NPO     Standing Status:   Standing     Number of Occurrences:   8     Order Specific Question:   Restrict to:     Answer:   Sips with Medications [3]     Lab Results:  Recent Labs      05/05/18   0530   WBC  12.6*   RBC  4.68   HEMOGLOBIN  14.3   HEMATOCRIT  40.1   MCV  85.7   MCH  30.6   MCHC  35.7*   RDW  42.3   PLATELETCT  279   MPV  8.6*     Recent Labs      05/05/18   0530  05/06/18   0409   SODIUM  133*  133*   POTASSIUM  3.8  3.8   CHLORIDE  95*  98   CO2  30  27   GLUCOSE  68  109*   BUN  27*  23*   CREATININE  0.92  0.90   CALCIUM  10.0  8.7     Recent Labs      05/05/18 0530   APTT  30.7   INR  1.07     Recent Labs      05/05/18   0530   BNPBTYPENAT  584*     Recent Labs      05/05/18 0530   TROPONINI  0.01   BNPBTYPENAT  584*             Medical Decision Making, by Problem:  Active Hospital Problems    Diagnosis   • Paroxysmal atrial fibrillation (HCC) [I48.0]   • Leukocytosis [D72.829]   • Cystitis [N30.90]   • GERD (gastroesophageal reflux disease) [K21.9]   • Hyperkalemia [E87.5]   • Sepsis (HCC) [A41.9]       Assessment/Plan:    PAF/junctional/brachycardia/tachycardia  -PPM on Monday  -Hold OAC until 24 hrs post PPM ( resume 5/8/18 am )  -continue with Sotalol  - Metoprolol 25 mg BID  -'s. asympyomatic      Leukocytosis  -On empiric Rocephin x3 days for uncomplicated cystitis    NPO after MN      Quality-Core Measures   Reviewed items::  Medications reviewed and Labs reviewed

## 2018-05-06 NOTE — PROGRESS NOTES
[facility] Hospitalist Progress Note    Date of Service: 5/6/2018    Chief Complaint  84 y.o. female admitted 5/5/2018 with bradycardia / hyperkalemia from OSH. Underlying history of A-Fib. Patient of Dr Maddox in outpatient setting. AC with Eliquis and was on Sotalol / Diltiazem prior to presentation. Patient presented to the outlying facility with fatigue, weakness and near syncope x 24 hours. She was found to have bradycardia with hyperkalemia there and transferred to our emergency room for further evaluation and management.     Interval Problem Update  Patient seen and evaluated on rounds  Doing well   Minimal fatigue and weakness  Feels significantly well compared to presentation  No other complaints  Agreeable to PPM at this time  Cardiology team on board  No other complaints per patient  Discussed with nursing, no concerns from nursing perspective    Consultants/Specialty  Cardiology     Disposition  Continue telemetry monitoring  Obtain PT/OT evaluation for disposition needs  Discharge once cleared from cardiology perspective         Review of Systems   Constitutional: Positive for malaise/fatigue. Negative for chills, diaphoresis and fever.   HENT: Negative for hearing loss and tinnitus.    Eyes: Negative for blurred vision and double vision.   Respiratory: Negative for cough, hemoptysis, sputum production, shortness of breath and wheezing.    Cardiovascular: Positive for leg swelling (chronic). Negative for chest pain, palpitations, orthopnea, claudication and PND.   Gastrointestinal: Negative for abdominal pain, blood in stool, constipation, diarrhea, heartburn, melena, nausea and vomiting.   Genitourinary: Negative for dysuria, flank pain, frequency, hematuria and urgency.   Musculoskeletal: Positive for back pain (from being in hospital bed per patient). Negative for falls, joint pain, myalgias and neck pain.   Skin: Negative for itching and rash.   Neurological: Positive for weakness. Negative for  dizziness, tingling, tremors, sensory change, speech change, focal weakness, seizures, loss of consciousness and headaches.   Psychiatric/Behavioral: Negative for depression, hallucinations, memory loss, substance abuse and suicidal ideas. The patient is not nervous/anxious and does not have insomnia.       Physical Exam  Laboratory/Imaging   Hemodynamics  Temp (24hrs), Av.6 °C (97.8 °F), Min:36.1 °C (97 °F), Max:36.9 °C (98.5 °F)   Temperature: 36.8 °C (98.2 °F)  Pulse  Av.8  Min: 49  Max: 130 Heart Rate (Monitored): (!) 135  Blood Pressure : 114/74      Respiratory      Respiration: 16, Pulse Oximetry: 95 %     Work Of Breathing / Effort: Mild  RUL Breath Sounds: Clear, RML Breath Sounds: Diminished, RLL Breath Sounds: Diminished, MARINA Breath Sounds: Clear, LLL Breath Sounds: Diminished    Fluids    Intake/Output Summary (Last 24 hours) at 18 1027  Last data filed at 18 0600   Gross per 24 hour   Intake              800 ml   Output              300 ml   Net              500 ml       Nutrition  Orders Placed This Encounter   Procedures   • Diet Order     Standing Status:   Standing     Number of Occurrences:   1     Order Specific Question:   Diet:     Answer:   Cardiac [6]   • DIET NPO     Standing Status:   Standing     Number of Occurrences:   8     Order Specific Question:   Restrict to:     Answer:   Sips with Medications [3]     Physical Exam   Constitutional: She is oriented to person, place, and time. She appears well-developed and well-nourished. No distress.   HENT:   Head: Normocephalic.   Mouth/Throat: Oropharynx is clear and moist. No oropharyngeal exudate.   Eyes: Conjunctivae are normal. Pupils are equal, round, and reactive to light. No scleral icterus.   Neck: No JVD present.   Cardiovascular: Normal rate and regular rhythm.  Exam reveals no gallop and no friction rub.    Murmur heard.  Pulmonary/Chest: Breath sounds normal. No stridor. No respiratory distress. She has no  wheezes. She has no rales.   Abdominal: Soft. Bowel sounds are normal. She exhibits no distension. There is no tenderness. There is no rebound and no guarding.   Musculoskeletal: Normal range of motion. She exhibits edema (Minimal). She exhibits no tenderness or deformity.   Neurological: She is alert and oriented to person, place, and time. No cranial nerve deficit.   Skin: Skin is warm and dry. She is not diaphoretic.   SC fat loss  Ecchymosis of different site from bruising skin   Psychiatric: She has a normal mood and affect. Her behavior is normal. Judgment and thought content normal.       Recent Labs      05/05/18   0530   WBC  12.6*   RBC  4.68   HEMOGLOBIN  14.3   HEMATOCRIT  40.1   MCV  85.7   MCH  30.6   MCHC  35.7*   RDW  42.3   PLATELETCT  279   MPV  8.6*     Recent Labs      05/05/18   0530  05/06/18   0409   SODIUM  133*  133*   POTASSIUM  3.8  3.8   CHLORIDE  95*  98   CO2  30  27   GLUCOSE  68  109*   BUN  27*  23*   CREATININE  0.92  0.90   CALCIUM  10.0  8.7     Recent Labs      05/05/18   0530   APTT  30.7   INR  1.07     Recent Labs      05/05/18   0530   BNPBTYPENAT  584*              Assessment/Plan     Paroxysmal atrial fibrillation (HCC)- (present on admission)   Assessment & Plan    Initially presented to outside facility with symptomatic bradycardia , likely secondary to sotalol and diltiazem combination  Cardioversion attempted in the ER, unsuccessful  Cardiology team on board  Plan for PPM placement tomorrow to allow for aggressive rate control, NPO after midnight  Continue Sotalol and metoprolol at this time per cardiology team  Continue AC with Eliquis  Continue telemetry monitoring  Further recommendations per cardiology team         Cystitis- (present on admission)   Assessment & Plan    Uncomplicated  Ceftriaxone x 3 days        Leukocytosis- (present on admission)   Assessment & Plan    Leukocytosis may be stress related from underlying cardiac issues  Will treat with empiric  ceftriaxone x 3 days for uncomplicated cystitis  Will continue close clinical monitoring        GERD (gastroesophageal reflux disease)- (present on admission)   Assessment & Plan    Stop PPI while empiric abx used during hospital stay to limit risk of C Diff        Sepsis (HCC)- (present on admission)   Assessment & Plan    This has been ruled out as a diagnosis  Bradycardia / tachycardia 2/2 cardiac conditions  Leukocytosis may be stress related from underlying cardiac issues  Will treat with empiric ceftriaxone x 3 days for uncomplicated cystitis  Will continue close clinical monitoring        Hyperkalemia- (present on admission)   Assessment & Plan    That was treated at outside facility with calcium gluconate, insulin, dextrose, bicarb and it was resolved  Will monitor patient on telemetry  Daily K monitoring  Holding aldactone           Quality-Core Measures   Reviewed items::  Labs reviewed, Medications reviewed and Radiology images reviewed  Robles catheter::  No Robles  DVT: Eliquis.  Ulcer Prophylaxis::  Not indicated  Antibiotics:  Treating active infection/contamination beyond 24 hours perioperative coverage  Assessed for rehabilitation services:  Patient was assess for and/or received rehabilitation services during this hospitalization

## 2018-05-06 NOTE — CARE PLAN
Problem: Communication  Goal: The ability to communicate needs accurately and effectively will improve  Outcome: PROGRESSING AS EXPECTED  Updated pt on POC at bedside report. Discussed pt desire for more education on PPM procedure tomorrow. Will give pt Flakomes education today and discuss what to expect after procedure tomorrow.     Problem: Safety  Goal: Will remain free from injury  Outcome: PROGRESSING AS EXPECTED  Pt Bed locked in lowest position. Non skid socks on. Call light and belongings within reach. Pt personal walker out of sight to encourage pt to call before getting out of bed.     Problem: Infection  Goal: Will remain free from infection  Outcome: PROGRESSING AS EXPECTED  Educated pt on importance on hand hygiene to prevent infection. Encouraged pt to turn, cough and deep breathe.Educated pt to get out of bed and ambulate to prevent pneumonia.     Problem: Knowledge Deficit  Goal: Knowledge of disease process/condition, treatment plan, diagnostic tests, and medications will improve  Outcome: PROGRESSING AS EXPECTED  Pt shows readiness to learn by asking for a handout on PPM procedure tomorrow. Pt Educated on what to expect tonight and tomorrow after the procedure.

## 2018-05-06 NOTE — PROGRESS NOTES
Received report from Irish, at pt bedside. Pt C/O of symptomatic tachycardia with afib, Per cardiologist okay for pt to tach up to the 130s without paging, pt QTC was 539 on last EKG, waiting for pacemaker Monday, POC discussed. Call light and belongings within reach. Bed locked and in low position. Alarm on and fall precautions in place.

## 2018-05-07 ENCOUNTER — APPOINTMENT (OUTPATIENT)
Dept: RADIOLOGY | Facility: MEDICAL CENTER | Age: 83
DRG: 243 | End: 2018-05-07
Attending: INTERNAL MEDICINE
Payer: MEDICARE

## 2018-05-07 LAB
ALBUMIN SERPL BCP-MCNC: 3.6 G/DL (ref 3.2–4.9)
ALBUMIN/GLOB SERPL: 1.3 G/DL
ALP SERPL-CCNC: 61 U/L (ref 30–99)
ALT SERPL-CCNC: 14 U/L (ref 2–50)
ANION GAP SERPL CALC-SCNC: 9 MMOL/L (ref 0–11.9)
AST SERPL-CCNC: 16 U/L (ref 12–45)
BASOPHILS # BLD AUTO: 1 % (ref 0–1.8)
BASOPHILS # BLD: 0.09 K/UL (ref 0–0.12)
BILIRUB SERPL-MCNC: 0.5 MG/DL (ref 0.1–1.5)
BUN SERPL-MCNC: 22 MG/DL (ref 8–22)
CALCIUM SERPL-MCNC: 9 MG/DL (ref 8.5–10.5)
CHLORIDE SERPL-SCNC: 99 MMOL/L (ref 96–112)
CO2 SERPL-SCNC: 25 MMOL/L (ref 20–33)
CREAT SERPL-MCNC: 0.9 MG/DL (ref 0.5–1.4)
EKG IMPRESSION: NORMAL
EOSINOPHIL # BLD AUTO: 0.18 K/UL (ref 0–0.51)
EOSINOPHIL NFR BLD: 1.9 % (ref 0–6.9)
ERYTHROCYTE [DISTWIDTH] IN BLOOD BY AUTOMATED COUNT: 48.3 FL (ref 35.9–50)
GLOBULIN SER CALC-MCNC: 2.8 G/DL (ref 1.9–3.5)
GLUCOSE SERPL-MCNC: 106 MG/DL (ref 65–99)
HCT VFR BLD AUTO: 42.3 % (ref 37–47)
HGB BLD-MCNC: 13.6 G/DL (ref 12–16)
IMM GRANULOCYTES # BLD AUTO: 0.05 K/UL (ref 0–0.11)
IMM GRANULOCYTES NFR BLD AUTO: 0.5 % (ref 0–0.9)
LYMPHOCYTES # BLD AUTO: 2.42 K/UL (ref 1–4.8)
LYMPHOCYTES NFR BLD: 26 % (ref 22–41)
MAGNESIUM SERPL-MCNC: 2 MG/DL (ref 1.5–2.5)
MCH RBC QN AUTO: 30.1 PG (ref 27–33)
MCHC RBC AUTO-ENTMCNC: 32.2 G/DL (ref 33.6–35)
MCV RBC AUTO: 93.6 FL (ref 81.4–97.8)
MONOCYTES # BLD AUTO: 1.19 K/UL (ref 0–0.85)
MONOCYTES NFR BLD AUTO: 12.8 % (ref 0–13.4)
NEUTROPHILS # BLD AUTO: 5.39 K/UL (ref 2–7.15)
NEUTROPHILS NFR BLD: 57.8 % (ref 44–72)
NRBC # BLD AUTO: 0 K/UL
NRBC BLD-RTO: 0 /100 WBC
PHOSPHATE SERPL-MCNC: 3.5 MG/DL (ref 2.5–4.5)
PLATELET # BLD AUTO: 189 K/UL (ref 164–446)
PMV BLD AUTO: 8.8 FL (ref 9–12.9)
POTASSIUM SERPL-SCNC: 4 MMOL/L (ref 3.6–5.5)
PROT SERPL-MCNC: 6.4 G/DL (ref 6–8.2)
RBC # BLD AUTO: 4.52 M/UL (ref 4.2–5.4)
SODIUM SERPL-SCNC: 133 MMOL/L (ref 135–145)
WBC # BLD AUTO: 9.3 K/UL (ref 4.8–10.8)

## 2018-05-07 PROCEDURE — C1729 CATH, DRAINAGE: HCPCS | Performed by: INTERNAL MEDICINE

## 2018-05-07 PROCEDURE — 36415 COLL VENOUS BLD VENIPUNCTURE: CPT

## 2018-05-07 PROCEDURE — 80053 COMPREHEN METABOLIC PANEL: CPT

## 2018-05-07 PROCEDURE — 0JH606Z INSERTION OF PACEMAKER, DUAL CHAMBER INTO CHEST SUBCUTANEOUS TISSUE AND FASCIA, OPEN APPROACH: ICD-10-PCS | Performed by: INTERNAL MEDICINE

## 2018-05-07 PROCEDURE — 770020 HCHG ROOM/CARE - TELE (206)

## 2018-05-07 PROCEDURE — C1894 INTRO/SHEATH, NON-LASER: HCPCS

## 2018-05-07 PROCEDURE — C1898 LEAD, PMKR, OTHER THAN TRANS: HCPCS

## 2018-05-07 PROCEDURE — 93005 ELECTROCARDIOGRAM TRACING: CPT | Performed by: INTERNAL MEDICINE

## 2018-05-07 PROCEDURE — 99152 MOD SED SAME PHYS/QHP 5/>YRS: CPT

## 2018-05-07 PROCEDURE — 0W9B30Z DRAINAGE OF LEFT PLEURAL CAVITY WITH DRAINAGE DEVICE, PERCUTANEOUS APPROACH: ICD-10-PCS | Performed by: RADIOLOGY

## 2018-05-07 PROCEDURE — 99153 MOD SED SAME PHYS/QHP EA: CPT

## 2018-05-07 PROCEDURE — 83735 ASSAY OF MAGNESIUM: CPT

## 2018-05-07 PROCEDURE — A9270 NON-COVERED ITEM OR SERVICE: HCPCS | Performed by: INTERNAL MEDICINE

## 2018-05-07 PROCEDURE — 700102 HCHG RX REV CODE 250 W/ 637 OVERRIDE(OP): Performed by: INTERNAL MEDICINE

## 2018-05-07 PROCEDURE — 02H63JZ INSERTION OF PACEMAKER LEAD INTO RIGHT ATRIUM, PERCUTANEOUS APPROACH: ICD-10-PCS | Performed by: INTERNAL MEDICINE

## 2018-05-07 PROCEDURE — C1785 PMKR, DUAL, RATE-RESP: HCPCS

## 2018-05-07 PROCEDURE — 700111 HCHG RX REV CODE 636 W/ 250 OVERRIDE (IP): Performed by: RADIOLOGY

## 2018-05-07 PROCEDURE — 700111 HCHG RX REV CODE 636 W/ 250 OVERRIDE (IP)

## 2018-05-07 PROCEDURE — 700101 HCHG RX REV CODE 250

## 2018-05-07 PROCEDURE — 700111 HCHG RX REV CODE 636 W/ 250 OVERRIDE (IP): Performed by: INTERNAL MEDICINE

## 2018-05-07 PROCEDURE — 304952 HCHG R 2 PADS

## 2018-05-07 PROCEDURE — C1892 INTRO/SHEATH,FIXED,PEEL-AWAY: HCPCS

## 2018-05-07 PROCEDURE — 33208 INSRT HEART PM ATRIAL & VENT: CPT

## 2018-05-07 PROCEDURE — 84100 ASSAY OF PHOSPHORUS: CPT

## 2018-05-07 PROCEDURE — 304853 HCHG PPM TEST CABLE

## 2018-05-07 PROCEDURE — 99233 SBSQ HOSP IP/OBS HIGH 50: CPT | Performed by: INTERNAL MEDICINE

## 2018-05-07 PROCEDURE — 700102 HCHG RX REV CODE 250 W/ 637 OVERRIDE(OP): Mod: TB | Performed by: INTERNAL MEDICINE

## 2018-05-07 PROCEDURE — 71045 X-RAY EXAM CHEST 1 VIEW: CPT

## 2018-05-07 PROCEDURE — 93010 ELECTROCARDIOGRAM REPORT: CPT | Performed by: INTERNAL MEDICINE

## 2018-05-07 PROCEDURE — 85025 COMPLETE CBC W/AUTO DIFF WBC: CPT

## 2018-05-07 PROCEDURE — 305387 HCHG SUTURES

## 2018-05-07 PROCEDURE — A9270 NON-COVERED ITEM OR SERVICE: HCPCS | Mod: TB | Performed by: INTERNAL MEDICINE

## 2018-05-07 PROCEDURE — 02HK3JZ INSERTION OF PACEMAKER LEAD INTO RIGHT VENTRICLE, PERCUTANEOUS APPROACH: ICD-10-PCS | Performed by: INTERNAL MEDICINE

## 2018-05-07 RX ORDER — BUPIVACAINE HYDROCHLORIDE 2.5 MG/ML
INJECTION, SOLUTION EPIDURAL; INFILTRATION; INTRACAUDAL
Status: COMPLETED
Start: 2018-05-07 | End: 2018-05-07

## 2018-05-07 RX ORDER — MIDAZOLAM HYDROCHLORIDE 1 MG/ML
.5-2 INJECTION INTRAMUSCULAR; INTRAVENOUS PRN
Status: ACTIVE | OUTPATIENT
Start: 2018-05-07 | End: 2018-05-07

## 2018-05-07 RX ORDER — SODIUM CHLORIDE 9 MG/ML
500 INJECTION, SOLUTION INTRAVENOUS
Status: ACTIVE | OUTPATIENT
Start: 2018-05-07 | End: 2018-05-07

## 2018-05-07 RX ORDER — OXYCODONE HYDROCHLORIDE 5 MG/1
2.5 TABLET ORAL
Status: DISCONTINUED | OUTPATIENT
Start: 2018-05-07 | End: 2018-05-11 | Stop reason: HOSPADM

## 2018-05-07 RX ORDER — MIDAZOLAM HYDROCHLORIDE 1 MG/ML
INJECTION INTRAMUSCULAR; INTRAVENOUS
Status: COMPLETED
Start: 2018-05-07 | End: 2018-05-07

## 2018-05-07 RX ORDER — ONDANSETRON 2 MG/ML
4 INJECTION INTRAMUSCULAR; INTRAVENOUS PRN
Status: ACTIVE | OUTPATIENT
Start: 2018-05-07 | End: 2018-05-07

## 2018-05-07 RX ORDER — ADENOSINE 3 MG/ML
INJECTION, SOLUTION INTRAVENOUS
Status: DISCONTINUED
Start: 2018-05-07 | End: 2018-05-07

## 2018-05-07 RX ORDER — LIDOCAINE HYDROCHLORIDE 10 MG/ML
INJECTION, SOLUTION INFILTRATION; PERINEURAL
Status: COMPLETED
Start: 2018-05-07 | End: 2018-05-07

## 2018-05-07 RX ADMIN — METOPROLOL TARTRATE 50 MG: 50 TABLET, FILM COATED ORAL at 05:28

## 2018-05-07 RX ADMIN — MIDAZOLAM HYDROCHLORIDE 2 MG: 1 INJECTION INTRAMUSCULAR; INTRAVENOUS at 18:20

## 2018-05-07 RX ADMIN — FENTANYL CITRATE 50 MCG: 50 INJECTION, SOLUTION INTRAMUSCULAR; INTRAVENOUS at 18:20

## 2018-05-07 RX ADMIN — FENTANYL CITRATE 50 MCG: 50 INJECTION, SOLUTION INTRAMUSCULAR; INTRAVENOUS at 18:23

## 2018-05-07 RX ADMIN — DILTIAZEM HYDROCHLORIDE 60 MG: 30 TABLET, FILM COATED ORAL at 20:40

## 2018-05-07 RX ADMIN — BUPIVACAINE HYDROCHLORIDE: 2.5 INJECTION, SOLUTION EPIDURAL; INFILTRATION; INTRACAUDAL; PERINEURAL at 15:15

## 2018-05-07 RX ADMIN — HYDROCODONE BITARTRATE AND ACETAMINOPHEN 1 TABLET: 7.5; 325 TABLET ORAL at 12:15

## 2018-05-07 RX ADMIN — SOTALOL HYDROCHLORIDE 80 MG: 80 TABLET ORAL at 05:35

## 2018-05-07 RX ADMIN — HYDROCODONE BITARTRATE AND ACETAMINOPHEN 1 TABLET: 7.5; 325 TABLET ORAL at 20:40

## 2018-05-07 RX ADMIN — VANCOMYCIN HYDROCHLORIDE 2000 MG: 1 INJECTION, POWDER, LYOPHILIZED, FOR SOLUTION INTRAVENOUS at 15:13

## 2018-05-07 RX ADMIN — CEFTRIAXONE SODIUM 1 G: 10 INJECTION, POWDER, FOR SOLUTION INTRAVENOUS at 05:29

## 2018-05-07 RX ADMIN — BUPIVACAINE HYDROCHLORIDE: 2.5 INJECTION, SOLUTION EPIDURAL; INFILTRATION; INTRACAUDAL; PERINEURAL at 15:13

## 2018-05-07 RX ADMIN — MIDAZOLAM 2 MG: 1 INJECTION INTRAMUSCULAR; INTRAVENOUS at 18:20

## 2018-05-07 RX ADMIN — FENTANYL CITRATE 100 MCG: 50 INJECTION, SOLUTION INTRAMUSCULAR; INTRAVENOUS at 15:27

## 2018-05-07 RX ADMIN — METOPROLOL TARTRATE 50 MG: 50 TABLET, FILM COATED ORAL at 20:40

## 2018-05-07 RX ADMIN — MELOXICAM 7.5 MG: 7.5 TABLET ORAL at 05:28

## 2018-05-07 RX ADMIN — MIDAZOLAM 2 MG: 1 INJECTION INTRAMUSCULAR; INTRAVENOUS at 15:14

## 2018-05-07 RX ADMIN — LIDOCAINE HYDROCHLORIDE: 10 INJECTION, SOLUTION INFILTRATION; PERINEURAL at 15:13

## 2018-05-07 ASSESSMENT — PAIN SCALES - GENERAL
PAINLEVEL_OUTOF10: 2
PAINLEVEL_OUTOF10: 0
PAINLEVEL_OUTOF10: 10
PAINLEVEL_OUTOF10: 0
PAINLEVEL_OUTOF10: 7

## 2018-05-07 ASSESSMENT — ENCOUNTER SYMPTOMS
WHEEZING: 0
SPEECH CHANGE: 0
TINGLING: 0
DIAPHORESIS: 0
ABDOMINAL PAIN: 0
CLAUDICATION: 0
PALPITATIONS: 0
DIZZINESS: 0
HEMOPTYSIS: 0
CHILLS: 0
INSOMNIA: 0
FEVER: 0
HALLUCINATIONS: 0
MEMORY LOSS: 0
TREMORS: 0
NERVOUS/ANXIOUS: 0
SPUTUM PRODUCTION: 0
SENSORY CHANGE: 0
DEPRESSION: 0
BLURRED VISION: 0
ORTHOPNEA: 0
CONSTIPATION: 0
SHORTNESS OF BREATH: 0
VOMITING: 0
MYALGIAS: 0
HEADACHES: 0
BLOOD IN STOOL: 0
LOSS OF CONSCIOUSNESS: 0
FALLS: 0
WEAKNESS: 0
DOUBLE VISION: 0
SEIZURES: 0
BACK PAIN: 1
NAUSEA: 0
NECK PAIN: 0
COUGH: 0
FOCAL WEAKNESS: 0
HEARTBURN: 0
FLANK PAIN: 0
PND: 0
DIARRHEA: 0
WEAKNESS: 1

## 2018-05-07 ASSESSMENT — LIFESTYLE VARIABLES: SUBSTANCE_ABUSE: 0

## 2018-05-07 NOTE — CARE PLAN
Problem: Communication  Goal: The ability to communicate needs accurately and effectively will improve  Outcome: PROGRESSING AS EXPECTED  POC discussed with pt. Will continue to update pt throughout the day as to when the PPM will occur.     Problem: Safety  Goal: Will remain free from injury  Outcome: PROGRESSING AS EXPECTED  Pt educated to call before getting out of bed. Pt bed locked in lowest position. Pt has non skid socks on and personal front wheel walker is out of sight to encourage pt to call for assistance.     Problem: Infection  Goal: Will remain free from infection  Outcome: PROGRESSING AS EXPECTED  Preventing infection by educating on hand hygiene and oral hygiene. Taught pt to turn cough and deep breathe, encouraged pt to get up to chair for all meals.     Problem: Discharge Barriers/Planning  Goal: Patient's continuum of care needs will be met  Outcome: PROGRESSING AS EXPECTED  Working with SW and physicians to ensure discharge with new medications can run smoothly.

## 2018-05-07 NOTE — PROGRESS NOTES
Pt transported by transport and ACLS nurse Cynthia. Monitor room called, pt off tele monitor and placed on zoll for transfer to cath lab.

## 2018-05-07 NOTE — PROGRESS NOTES
Cardiology Progress Note               Author: Zully Campbell Date & Time created: 2018  7:59 AM     Interval History:  84-year-old woman from [facility] who has A. fib on sotalol and with diltiazem had junctional bradycardia then significant tachycardia on sotalol and metoprolol failed amiodarone in the past and failed cardioversion ×2 in the emergency room. History of paroxysmal A. Fib on sotalol, on Eliquis, hypertension, back pain, arthritis    Chief Complaint:  Fatigue and weakness    Consultation:bradycardia/junctional, heart rate 20-32 with LBBB    18:denies any chest pain or MCKEON. Anxious about procedure       Review of Systems   Constitutional: Negative for malaise/fatigue.   Respiratory: Negative for cough, hemoptysis, sputum production, shortness of breath and wheezing.    Cardiovascular: Negative for chest pain, palpitations, orthopnea, claudication, leg swelling and PND.   Neurological: Negative for dizziness and weakness.       Physical Exam   Constitutional: She is oriented to person, place, and time. She appears well-developed and well-nourished.   HENT:   Head: Normocephalic.   Neck: Normal range of motion. No JVD present.   Cardiovascular: Normal rate and normal heart sounds.  An irregularly irregular rhythm present.   No murmur heard.  Pulmonary/Chest: Effort normal and breath sounds normal. No respiratory distress. She has no wheezes.   Abdominal: Bowel sounds are normal.   Musculoskeletal: She exhibits no edema or tenderness.   Neurological: She is alert and oriented to person, place, and time.   Skin: Skin is warm and dry.   Psychiatric: Her behavior is normal. Judgment normal.   Nursing note and vitals reviewed.      Hemodynamics:  Temp (24hrs), Av.4 °C (97.6 °F), Min:36.2 °C (97.1 °F), Max:36.8 °C (98.2 °F)  Temperature: 36.4 °C (97.6 °F)  Pulse  Av.1  Min: 49  Max: 130  Blood Pressure : 129/80     Respiratory:    Respiration: 20, Pulse Oximetry: 93 %     Work Of Breathing /  Effort: Mild;Shallow  RUL Breath Sounds: Clear, RML Breath Sounds: Diminished, RLL Breath Sounds: Diminished, MARINA Breath Sounds: Clear, LLL Breath Sounds: Diminished  Fluids:     Weight: 65 kg (143 lb 4.8 oz)  GI/Nutrition:  Orders Placed This Encounter   Procedures   • Diet NPO: at Midnight, Sips with Medications     Standing Status:   Standing     Number of Occurrences:   8     Order Specific Question:   Restrict to:     Answer:   Sips with Medications [3]     Lab Results:  Recent Labs      05/05/18   0530  05/07/18   0227   WBC  12.6*  9.3   RBC  4.68  4.52   HEMOGLOBIN  14.3  13.6   HEMATOCRIT  40.1  42.3   MCV  85.7  93.6   MCH  30.6  30.1   MCHC  35.7*  32.2*   RDW  42.3  48.3   PLATELETCT  279  189   MPV  8.6*  8.8*     Recent Labs      05/05/18   0530  05/06/18   0409  05/07/18   0227   SODIUM  133*  133*  133*   POTASSIUM  3.8  3.8  4.0   CHLORIDE  95*  98  99   CO2  30  27  25   GLUCOSE  68  109*  106*   BUN  27*  23*  22   CREATININE  0.92  0.90  0.90   CALCIUM  10.0  8.7  9.0     Recent Labs      05/05/18   0530   APTT  30.7   INR  1.07     Recent Labs      05/05/18   0530   BNPBTYPENAT  584*     Recent Labs      05/05/18   0530   TROPONINI  0.01   BNPBTYPENAT  584*             Medical Decision Making, by Problem:  Active Hospital Problems    Diagnosis   • Paroxysmal atrial fibrillation (HCC) [I48.0]   • Leukocytosis [D72.829]   • Cystitis [N30.90]   • GERD (gastroesophageal reflux disease) [K21.9]   • Hyperkalemia [E87.5]   • Sepsis (HCC) [A41.9]       Plan:  1. PAF/junctional/brachycardia/tachycardia:  - PPM today, possible AV Junctional ablation   - now tachycardia, continue sotalol and metoprolol 25mg BID   - Hold OAC until 24 hrs post PPM ( resume 5/8/18 am )    Afebrile, WBC 9.3, INR 1.07, patient is right handed    Quality-Core Measures

## 2018-05-07 NOTE — OP REPORT
PROCEDURE PERFORMED: Permanent Pacemaker Implantation    DATE OF SERVICE: 5/7/2018    : Bella Jones MD    ASSISTANT: None    ANESTHESIA: Local and moderate sedation given at my direction (start time 302 PM, stop time 340 PM, total dose used 2 mg IV versed, 100 mcg IV fentanyl)    EBL: 30 cc    SPECIMENS: None    INDICATION(S):  Paroxysmal complete heart block  Atrial fibrillation  Tachy-jo syndrome    DESCRIPTION OF PROCEDURE:  After informed written consent, the patient was brought to the electrophysiology lab in the fasting, unsedated state. The patient was prepped and draped in the usual sterile fashion. The procedure was performed under moderate sedation with local anesthetic. A left upper extremity venogram was performed, demonstrating a patent subclavian vein. A left infraclavicular incision was made with a scalpel and the pectoral device pocket was created using a combination of blunt dissection and electrocautery. The modified Seldinger technique was used to gain access to the left axillary vein. A peel-away hemostasis sheath was placed in the vein. Under fluoroscopic guidance, the pacemaker leads were introduced into the heart. The ventricular lead was advanced to the RVOT and then lowered into position at the RV apex. The atrial lead was positioned on R atrial appendage. The leads were tested and had satisfactory sensing and pacing parameters. High output ventricular pacing did not produce extracardiac stimulation. The leads were sutured to the underlying pectoral muscle with interrupted silk over a silastic suture sleeve. The device pocket was irrigated with antibiotic solution, inspected, and no bleeding was seen. The leads were connected to the pacemaker pulse generator and the device was inserted into the pocket. The wound was closed with three layers of absorbable sutures. Following recovery from sedation, the patient was transferred to a monitored bed in good condition.     IMPLANTED  DEVICE INFORMATION:  Pulse generator is a SJM model IE3030  Serial # [id number]    LEAD INFORMATION:  1)Right atrial lead is a SJM model #AHD2800M/46 , serial #[id number], P wave 0.4 millivolts (AF), pacing impedance 540 Ohms.    2)Right ventricular lead is a SJM model #[id number]/52, serial #[id number], R wave 12.0 millivolts, threshold 0.5 Volts at 0.5 milliseconds, pacing impedance 790 Ohms.    DEVICE PROGRAMMING:  DDD 60 -120 ppm    FLUOROSCOPY TIME: 2.7 minutes    IMPRESSIONS:  1. Successful dual chamber pacemaker implantation    COMPLICATION(S): None    RECOMMENDATIONS:  1. Transfer to monitored bed  2. Chest x-ray  3. Device interrogation prior to hospital discharge  4. Followup in device clinic

## 2018-05-07 NOTE — CARE PLAN
Problem: Knowledge Deficit  Goal: Knowledge of disease process/condition, treatment plan, diagnostic tests, and medications will improve  Outcome: PROGRESSING AS EXPECTED  Pt educated regarding plan of care, pt is knowledgeable and involved in plan, verbalizes understanding.  Pt has been NPO since midnight for pacemaker placement today.     Problem: Pain Management  Goal: Pain level will decrease to patient's comfort goal  Outcome: PROGRESSING AS EXPECTED  Pt reports pain is well controlled with PRN intervention per MAR.

## 2018-05-07 NOTE — PROGRESS NOTES
Received report from Irish, at pt bedside. Pt will receive PPM today, orders to hold eliquist for 48 hours, POC discussed. Call light and belongings within reach. Bed locked and in low position. Alarm on and fall precautions in place.

## 2018-05-07 NOTE — THERAPY
consult received; junctional bradycardia then significant tachycardia on sotalol and metoprolol failed amiodarone in the past and failed cardioversion ×2 in the emergency room; now awaiting PPM placement today (?); will see post procedure as medically appropriate

## 2018-05-08 ENCOUNTER — PATIENT OUTREACH (OUTPATIENT)
Dept: HEALTH INFORMATION MANAGEMENT | Facility: OTHER | Age: 83
End: 2018-05-08

## 2018-05-08 ENCOUNTER — APPOINTMENT (OUTPATIENT)
Dept: RADIOLOGY | Facility: MEDICAL CENTER | Age: 83
DRG: 243 | End: 2018-05-08
Attending: NURSE PRACTITIONER
Payer: MEDICARE

## 2018-05-08 ENCOUNTER — APPOINTMENT (OUTPATIENT)
Dept: RADIOLOGY | Facility: MEDICAL CENTER | Age: 83
DRG: 243 | End: 2018-05-08
Attending: INTERNAL MEDICINE
Payer: MEDICARE

## 2018-05-08 PROBLEM — J95.811 IATROGENIC PNEUMOTHORAX: Status: ACTIVE | Noted: 2018-05-08

## 2018-05-08 LAB
ANION GAP SERPL CALC-SCNC: 8 MMOL/L (ref 0–11.9)
BUN SERPL-MCNC: 20 MG/DL (ref 8–22)
CALCIUM SERPL-MCNC: 8.6 MG/DL (ref 8.5–10.5)
CHLORIDE SERPL-SCNC: 101 MMOL/L (ref 96–112)
CO2 SERPL-SCNC: 26 MMOL/L (ref 20–33)
CREAT SERPL-MCNC: 0.86 MG/DL (ref 0.5–1.4)
EKG IMPRESSION: NORMAL
ERYTHROCYTE [DISTWIDTH] IN BLOOD BY AUTOMATED COUNT: 46.9 FL (ref 35.9–50)
GLUCOSE SERPL-MCNC: 113 MG/DL (ref 65–99)
HCT VFR BLD AUTO: 37.8 % (ref 37–47)
HGB BLD-MCNC: 12.6 G/DL (ref 12–16)
MAGNESIUM SERPL-MCNC: 1.9 MG/DL (ref 1.5–2.5)
MCH RBC QN AUTO: 30.3 PG (ref 27–33)
MCHC RBC AUTO-ENTMCNC: 33.3 G/DL (ref 33.6–35)
MCV RBC AUTO: 90.9 FL (ref 81.4–97.8)
PHOSPHATE SERPL-MCNC: 4.2 MG/DL (ref 2.5–4.5)
PLATELET # BLD AUTO: 195 K/UL (ref 164–446)
PMV BLD AUTO: 8.7 FL (ref 9–12.9)
POTASSIUM SERPL-SCNC: 4.1 MMOL/L (ref 3.6–5.5)
RBC # BLD AUTO: 4.16 M/UL (ref 4.2–5.4)
SODIUM SERPL-SCNC: 135 MMOL/L (ref 135–145)
WBC # BLD AUTO: 9.8 K/UL (ref 4.8–10.8)

## 2018-05-08 PROCEDURE — 93010 ELECTROCARDIOGRAM REPORT: CPT | Performed by: INTERNAL MEDICINE

## 2018-05-08 PROCEDURE — 700102 HCHG RX REV CODE 250 W/ 637 OVERRIDE(OP): Performed by: INTERNAL MEDICINE

## 2018-05-08 PROCEDURE — 700111 HCHG RX REV CODE 636 W/ 250 OVERRIDE (IP)

## 2018-05-08 PROCEDURE — A9270 NON-COVERED ITEM OR SERVICE: HCPCS | Performed by: NURSE PRACTITIONER

## 2018-05-08 PROCEDURE — 85027 COMPLETE CBC AUTOMATED: CPT

## 2018-05-08 PROCEDURE — 83735 ASSAY OF MAGNESIUM: CPT

## 2018-05-08 PROCEDURE — A9270 NON-COVERED ITEM OR SERVICE: HCPCS | Performed by: INTERNAL MEDICINE

## 2018-05-08 PROCEDURE — 80048 BASIC METABOLIC PNL TOTAL CA: CPT

## 2018-05-08 PROCEDURE — 0W2BX0Z CHANGE DRAINAGE DEVICE IN LEFT PLEURAL CAVITY, EXTERNAL APPROACH: ICD-10-PCS | Performed by: RADIOLOGY

## 2018-05-08 PROCEDURE — 700102 HCHG RX REV CODE 250 W/ 637 OVERRIDE(OP): Performed by: NURSE PRACTITIONER

## 2018-05-08 PROCEDURE — 93005 ELECTROCARDIOGRAM TRACING: CPT | Performed by: INTERNAL MEDICINE

## 2018-05-08 PROCEDURE — 700101 HCHG RX REV CODE 250

## 2018-05-08 PROCEDURE — 99233 SBSQ HOSP IP/OBS HIGH 50: CPT | Performed by: INTERNAL MEDICINE

## 2018-05-08 PROCEDURE — 71045 X-RAY EXAM CHEST 1 VIEW: CPT

## 2018-05-08 PROCEDURE — 770020 HCHG ROOM/CARE - TELE (206)

## 2018-05-08 PROCEDURE — 36415 COLL VENOUS BLD VENIPUNCTURE: CPT

## 2018-05-08 PROCEDURE — 99153 MOD SED SAME PHYS/QHP EA: CPT

## 2018-05-08 PROCEDURE — 84100 ASSAY OF PHOSPHORUS: CPT

## 2018-05-08 RX ORDER — METOPROLOL TARTRATE 50 MG/1
100 TABLET, FILM COATED ORAL TWICE DAILY
Status: DISCONTINUED | OUTPATIENT
Start: 2018-05-08 | End: 2018-05-11 | Stop reason: HOSPADM

## 2018-05-08 RX ORDER — LIDOCAINE HYDROCHLORIDE 10 MG/ML
INJECTION, SOLUTION INFILTRATION; PERINEURAL
Status: COMPLETED
Start: 2018-05-08 | End: 2018-05-08

## 2018-05-08 RX ORDER — ONDANSETRON 2 MG/ML
4 INJECTION INTRAMUSCULAR; INTRAVENOUS PRN
Status: ACTIVE | OUTPATIENT
Start: 2018-05-08 | End: 2018-05-08

## 2018-05-08 RX ORDER — OMEPRAZOLE 20 MG/1
20 CAPSULE, DELAYED RELEASE ORAL DAILY
Status: DISCONTINUED | OUTPATIENT
Start: 2018-05-08 | End: 2018-05-11 | Stop reason: HOSPADM

## 2018-05-08 RX ORDER — MIDAZOLAM HYDROCHLORIDE 1 MG/ML
INJECTION INTRAMUSCULAR; INTRAVENOUS
Status: COMPLETED
Start: 2018-05-08 | End: 2018-05-08

## 2018-05-08 RX ORDER — SODIUM CHLORIDE 9 MG/ML
500 INJECTION, SOLUTION INTRAVENOUS PRN
Status: DISCONTINUED | OUTPATIENT
Start: 2018-05-08 | End: 2018-05-11 | Stop reason: HOSPADM

## 2018-05-08 RX ORDER — MIDAZOLAM HYDROCHLORIDE 1 MG/ML
.5-2 INJECTION INTRAMUSCULAR; INTRAVENOUS PRN
Status: ACTIVE | OUTPATIENT
Start: 2018-05-08 | End: 2018-05-08

## 2018-05-08 RX ORDER — ZOLPIDEM TARTRATE 5 MG/1
2.5 TABLET ORAL NIGHTLY PRN
Status: DISCONTINUED | OUTPATIENT
Start: 2018-05-08 | End: 2018-05-11 | Stop reason: HOSPADM

## 2018-05-08 RX ADMIN — HYDROCODONE BITARTRATE AND ACETAMINOPHEN 1 TABLET: 7.5; 325 TABLET ORAL at 13:38

## 2018-05-08 RX ADMIN — MIDAZOLAM HYDROCHLORIDE 1 MG: 1 INJECTION INTRAMUSCULAR; INTRAVENOUS at 11:53

## 2018-05-08 RX ADMIN — METOPROLOL TARTRATE 50 MG: 50 TABLET, FILM COATED ORAL at 05:33

## 2018-05-08 RX ADMIN — DILTIAZEM HYDROCHLORIDE 60 MG: 30 TABLET, FILM COATED ORAL at 13:34

## 2018-05-08 RX ADMIN — DILTIAZEM HYDROCHLORIDE 60 MG: 30 TABLET, FILM COATED ORAL at 17:15

## 2018-05-08 RX ADMIN — LACTOBACILLUS ACIDOPHILUS / LACTOBACILLUS BULGARICUS 1 PACKET: 100 MILLION CFU STRENGTH GRANULES at 13:34

## 2018-05-08 RX ADMIN — HYDROCODONE BITARTRATE AND ACETAMINOPHEN 1 TABLET: 7.5; 325 TABLET ORAL at 01:30

## 2018-05-08 RX ADMIN — LIDOCAINE HYDROCHLORIDE: 10 INJECTION, SOLUTION INFILTRATION; PERINEURAL at 11:53

## 2018-05-08 RX ADMIN — HYDROCODONE BITARTRATE AND ACETAMINOPHEN 1 TABLET: 7.5; 325 TABLET ORAL at 17:21

## 2018-05-08 RX ADMIN — HYDROCODONE BITARTRATE AND ACETAMINOPHEN 1 TABLET: 7.5; 325 TABLET ORAL at 09:06

## 2018-05-08 RX ADMIN — DILTIAZEM HYDROCHLORIDE 60 MG: 30 TABLET, FILM COATED ORAL at 01:32

## 2018-05-08 RX ADMIN — OXYCODONE HYDROCHLORIDE 2.5 MG: 5 TABLET ORAL at 21:46

## 2018-05-08 RX ADMIN — APIXABAN 5 MG: 5 TABLET, FILM COATED ORAL at 05:34

## 2018-05-08 RX ADMIN — ZOLPIDEM TARTRATE 2.5 MG: 5 TABLET ORAL at 21:41

## 2018-05-08 RX ADMIN — DILTIAZEM HYDROCHLORIDE 60 MG: 30 TABLET, FILM COATED ORAL at 05:33

## 2018-05-08 RX ADMIN — METOPROLOL TARTRATE 100 MG: 50 TABLET, FILM COATED ORAL at 17:14

## 2018-05-08 RX ADMIN — MIDAZOLAM 1 MG: 1 INJECTION INTRAMUSCULAR; INTRAVENOUS at 11:53

## 2018-05-08 RX ADMIN — OMEPRAZOLE 20 MG: 20 CAPSULE, DELAYED RELEASE ORAL at 17:14

## 2018-05-08 RX ADMIN — LACTOBACILLUS ACIDOPHILUS / LACTOBACILLUS BULGARICUS 1 PACKET: 100 MILLION CFU STRENGTH GRANULES at 17:15

## 2018-05-08 RX ADMIN — MELOXICAM 7.5 MG: 7.5 TABLET ORAL at 05:33

## 2018-05-08 RX ADMIN — DILTIAZEM HYDROCHLORIDE 60 MG: 30 TABLET, FILM COATED ORAL at 23:12

## 2018-05-08 RX ADMIN — APIXABAN 5 MG: 5 TABLET, FILM COATED ORAL at 17:15

## 2018-05-08 ASSESSMENT — ENCOUNTER SYMPTOMS
PSYCHIATRIC NEGATIVE: 1
WHEEZING: 0
SPUTUM PRODUCTION: 0
LOSS OF CONSCIOUSNESS: 0
CHILLS: 0
SHORTNESS OF BREATH: 0
FEVER: 0
VOMITING: 0
HALLUCINATIONS: 0
TINGLING: 0
SPEECH CHANGE: 0
WEAKNESS: 1
BACK PAIN: 1
BRUISES/BLEEDS EASILY: 0
HEMOPTYSIS: 0
NAUSEA: 0
MYALGIAS: 0
PALPITATIONS: 0
FOCAL WEAKNESS: 0
SORE THROAT: 0
CONSTIPATION: 0
INSOMNIA: 1
FALLS: 0
SEIZURES: 0
DEPRESSION: 0
COUGH: 0
DOUBLE VISION: 0
DIAPHORESIS: 0
HEADACHES: 0
CLAUDICATION: 0
DIARRHEA: 0
MEMORY LOSS: 0
DIZZINESS: 0
ABDOMINAL PAIN: 0
BLOOD IN STOOL: 0
BLURRED VISION: 0
FLANK PAIN: 0
SENSORY CHANGE: 0
TREMORS: 0
PND: 0
ORTHOPNEA: 0
NECK PAIN: 0
HEARTBURN: 0
NERVOUS/ANXIOUS: 0

## 2018-05-08 ASSESSMENT — PAIN SCALES - GENERAL
PAINLEVEL_OUTOF10: 3
PAINLEVEL_OUTOF10: 4
PAINLEVEL_OUTOF10: 5
PAINLEVEL_OUTOF10: 4
PAINLEVEL_OUTOF10: 8
PAINLEVEL_OUTOF10: 8
PAINLEVEL_OUTOF10: 7
PAINLEVEL_OUTOF10: 9
PAINLEVEL_OUTOF10: 0
PAINLEVEL_OUTOF10: 0
PAINLEVEL_OUTOF10: 4
PAINLEVEL_OUTOF10: 6
PAINLEVEL_OUTOF10: 0
PAINLEVEL_OUTOF10: 8
PAINLEVEL_OUTOF10: 8

## 2018-05-08 ASSESSMENT — LIFESTYLE VARIABLES: SUBSTANCE_ABUSE: 0

## 2018-05-08 NOTE — PROGRESS NOTES
IR RN note:    Site Marked and Confirmed with MD, patient and RN pre procedure   Left chest tube placement by MD Hahn assisted by RT Lito/Scar,Left chest access site;  Chest tube placed   BS Flexima APDL locking pigtail drainage catheter 8 Fr x 25 cm  REF# [id number]   LOT# [id number]     End Tidal CO2 range 36-42 during procedure   Patient tolerated procedure, hemodynamically stable; pt awake and talking post procedure; report given to RN Lucy;   patient transported back to room via tele RN monitored then transferred care to report RN

## 2018-05-08 NOTE — PROGRESS NOTES
Received report from Irish, at pt bedside. Pt has chest tube in place with no drainage, waiting for PPM interrogation todau, POC discussed. Call light and belongings within reach. Bed locked and in low position. Alarm on and fall precautions in place.

## 2018-05-08 NOTE — OR SURGEON
Immediate Post- Operative Note        PostOp Diagnosis: chest tube dysfunction      Procedure(s): LEFT chest tube repositioning with upsize      Estimated Blood Loss: Less than 5 ml        Complications: None            5/8/2018     12:02 PM     Jaison Lackey

## 2018-05-08 NOTE — PROGRESS NOTES
Dr Jones called, pt has a pneumothorax, ordered chest tube placement.     1748  Spoke to radiologist for clarification on pneumothorax, chest tube placement with Dr Hahn. Dr Hahn  confirmed pneumothorax, pt sent down to IR with ACLS ANABELLE Gautam with pt on zoll monitor. Monitor room called.

## 2018-05-08 NOTE — OR SURGEON
Immediate Post- Operative Note        PostOp Diagnosis: Iatrogenic L ptx following L pacer insertion.       Procedure(s): CT guided L chest tube placement.       Estimated Blood Loss: Less than 5 ml        Complications: None            5/7/2018    1831 PM     Srinath Hahn

## 2018-05-08 NOTE — CARE PLAN
Problem: Safety  Goal: Will remain free from injury  Outcome: PROGRESSING AS EXPECTED  Pt educated regarding fall risk and safety, pt verbalizes understanding and calls appropriately for assistance. Bed is locked and in lowest position, safety maintained throughout shift.     Problem: Pain Management  Goal: Pain level will decrease to patient's comfort goal  Outcome: PROGRESSING AS EXPECTED  Patient reports pain is controlled with PRN intervention per MAR.

## 2018-05-08 NOTE — PROGRESS NOTES
IR Procedure Note:    Patient brought to IR lab.  Patient A&O on 2L nasal cannula and in no apparent distress.  Patient consented for procedure by Dr Lackey and all questions answered.  Site marked and confirmed with MD, patient and RN pre procedure.  Dr. Lackey performed left sided chest tube upsize/exchange.  The patient tolerated the procedure well; ETCo2 baseline 26, with consistent waveform during the procedure.  Gauze and medipore tape applied to drain insertion site.  Patient alert and oriented and verbally appropriate post procedure.  Patient transported to T8 and bedside report given to ANABELLE Swift.    [employer] Flexima APDL 10F x 25cm  REF [id number]  LOT [id number]

## 2018-05-08 NOTE — CARE PLAN
Problem: Communication  Goal: The ability to communicate needs accurately and effectively will improve  Outcome: PROGRESSING AS EXPECTED  Pt updated on procedures and tests done for POC.     Problem: Safety  Goal: Will remain free from injury  Outcome: PROGRESSING AS EXPECTED  Pt has bed locked in lowest position. Non skid socks on. Pt has call light with reach.      Problem: Knowledge Deficit  Goal: Knowledge of disease process/condition, treatment plan, diagnostic tests, and medications will improve  Outcome: PROGRESSING AS EXPECTED  Pt educated on changes in POC with MD at bedside. Pt educated on change of chest tube procedure.

## 2018-05-08 NOTE — PROGRESS NOTES
Cardiology Progress Note               Author: Alysha Mccloud Date & Time created: 5/8/2018  10:20 AM     Interval History:  Patient seen on EPS rounds.  84-year-old woman from [facility], [place], operated by [facility] who has A. fib on sotalol and with diltiazem had junctional bradycardia then significant tachycardia on sotalol and metoprolol failed amiodarone in the past and failed cardioversion ×2 in the emergency room. History of paroxysmal A. Fib on sotalol, on Eliquis, hypertension, back pain, arthritis    5/7/18 per Dr Jones:  IMPLANTED DEVICE INFORMATION:  Pulse generator is a SJM model [id number]  Serial # [id number]     LEAD INFORMATION:  1)Right atrial lead is a SJM model #[id number]/46 , serial #[id number], P wave 0.4 millivolts (AF), pacing impedance 540 Ohms.     2)Right ventricular lead is a SJM model #[id number]/52, serial #[id number], R wave 12.0 millivolts, threshold 0.5 Volts at 0.5 milliseconds, pacing impedance 790 Ohms.     DEVICE PROGRAMMING:  DDD 60 -120 ppm     FLUOROSCOPY TIME: 2.7 minutes     IMPRESSIONS:  1. Successful dual chamber pacemaker implantation     COMPLICATION(S): Ptx.    Patient underwent IR insertion of chest tube yesterday.  This AM CXR Ptx apical larger.  Chief Complaint:  AF   Junctional rhythm    Review of Systems   Constitutional: Negative for chills and fever.   HENT: Negative for sore throat.         No difficulty swallowing   Eyes: Negative for blurred vision and double vision.   Respiratory: Negative for cough, shortness of breath and wheezing.    Cardiovascular: Positive for chest pain (chest tube site.). Negative for palpitations, orthopnea, claudication, leg swelling and PND.   Gastrointestinal: Negative for abdominal pain, blood in stool, heartburn, nausea and vomiting.   Genitourinary: Negative for dysuria, frequency, hematuria and urgency.   Musculoskeletal: Negative for myalgias.   Skin: Negative.    Neurological: Negative for dizziness, speech change, focal weakness, loss of consciousness and headaches.    Endo/Heme/Allergies: Does not bruise/bleed easily.   Psychiatric/Behavioral: Negative.        Physical Exam   Constitutional: She is oriented to person, place, and time. She appears well-developed and well-nourished.   HENT:   Head: Normocephalic and atraumatic.   Eyes: Pupils are equal, round, and reactive to light.   Neck: Normal range of motion. Neck supple. No thyromegaly present.   Cardiovascular: Normal rate, regular rhythm, normal heart sounds and intact distal pulses.  Exam reveals no gallop and no friction rub.    No murmur heard.  Pulmonary/Chest: Effort normal and breath sounds normal. No respiratory distress. She has no wheezes. She has no rales. She exhibits no tenderness.   Device site and chest tube site uncomplicated.   Abdominal: Soft. Bowel sounds are normal. She exhibits no distension. There is no tenderness. There is no guarding.   Musculoskeletal: Normal range of motion. She exhibits no edema.   Neurological: She is alert and oriented to person, place, and time.   Skin: Skin is warm and dry.   Extensive bruising left forearm.   Psychiatric: She has a normal mood and affect.       Hemodynamics:  Temp (24hrs), Av.7 °C (98.1 °F), Min:36.1 °C (97 °F), Max:37.4 °C (99.4 °F)  Temperature: 36.4 °C (97.6 °F)  Pulse  Av.3  Min: 49  Max: 136  Blood Pressure : 103/66     Respiratory:    Respiration: 18, Pulse Oximetry: 92 %  Chest Tube Group 1 (A) Left;Anterior APDL Locking pigtail  8-Tube Status / Drainage: Patent, Chest Tube Group 1 (A) Left;Anterior APDL Locking pigtail  8-Device: Dry Closed Drainage System  Work Of Breathing / Effort: Moderate;Shallow  RUL Breath Sounds: Diminished, RML Breath Sounds: Diminished, RLL Breath Sounds: Diminished, MARINA Breath Sounds: Diminished, LLL Breath Sounds: Diminished  Fluids:     Weight: 64.5 kg (142 lb 3.2 oz)  GI/Nutrition:  Orders Placed This Encounter   Procedures   • DIET ORDER     Standing Status:   Standing     Number of Occurrences:   1     Order  Specific Question:   Diet:     Answer:   Cardiac [6]     Lab Results:  Recent Labs      05/07/18 0227 05/08/18   0056   WBC  9.3  9.8   RBC  4.52  4.16*   HEMOGLOBIN  13.6  12.6   HEMATOCRIT  42.3  37.8   MCV  93.6  90.9   MCH  30.1  30.3   MCHC  32.2*  33.3*   RDW  48.3  46.9   PLATELETCT  189  195   MPV  8.8*  8.7*     Recent Labs      05/06/18   0409  05/07/18 0227 05/08/18   0056   SODIUM  133*  133*  135   POTASSIUM  3.8  4.0  4.1   CHLORIDE  98  99  101   CO2  27  25  26   GLUCOSE  109*  106*  113*   BUN  23*  22  20   CREATININE  0.90  0.90  0.86   CALCIUM  8.7  9.0  8.6                         Medical Decision Making, by Problem:  Active Hospital Problems    Diagnosis   • Paroxysmal atrial fibrillation (HCC) [I48.0]   • Leukocytosis [D72.829]   • Cystitis [N30.90]   • GERD (gastroesophageal reflux disease) [K21.9]   • Hyperkalemia [E87.5]   • Sepsis (HCC) [A41.9]       Plan:  Per IR larger tube to be inserted for increased apical pneumothorax despite current CT to suction.  Metoprolol increased for AF rate control.  If continued issues may have to consider AVN ablation per Dr Jones.  NPO now.    Quality-Core Measures   Reviewed items::  EKG reviewed, Labs reviewed, Medications reviewed and Radiology images reviewed

## 2018-05-08 NOTE — PROGRESS NOTES
[facility] Hospitalist Progress Note    Date of Service: 5/8/2018    Chief Complaint  84 y.o. female admitted 5/5/2018 with bradycardia / hyperkalemia from OSH. Underlying history of A-Fib. Patient of Dr Maddox in outpatient setting. AC with Eliquis and was on Sotalol / Diltiazem prior to presentation. Patient presented to the outlying facility with fatigue, weakness and near syncope x 24 hours. She was found to have bradycardia with hyperkalemia there and transferred to our emergency room for further evaluation and management.     Interval Problem Update  5/7: PPM placed, complicated by L pneumothorax s/p L chest tube  5/8: Increased size of L chest tube, exchanged for larger tube.  4L O2    Consultants/Specialty  Cardiology   IR    Disposition  Continue telemetry monitoring  Obtain PT/OT evaluation for disposition needs  DC home after L pneumo resolved and CT removed        Review of Systems   Constitutional: Positive for malaise/fatigue. Negative for chills, diaphoresis and fever.   HENT: Negative for hearing loss and tinnitus.    Eyes: Negative for blurred vision and double vision.   Respiratory: Negative for cough, hemoptysis, sputum production, shortness of breath and wheezing.    Cardiovascular: Positive for leg swelling (chronic). Negative for chest pain, palpitations, orthopnea, claudication and PND.        Chest wall pain at chest tube site   Gastrointestinal: Negative for abdominal pain, blood in stool, constipation, diarrhea, heartburn, melena, nausea and vomiting.   Genitourinary: Negative for dysuria, flank pain, frequency, hematuria and urgency.   Musculoskeletal: Positive for back pain (Chronic, takes norco at home). Negative for falls, joint pain, myalgias and neck pain.   Skin: Negative for itching and rash.   Neurological: Positive for weakness. Negative for dizziness, tingling, tremors, sensory change, speech change, focal weakness, seizures, loss of consciousness and headaches.    Psychiatric/Behavioral: Negative for depression, hallucinations, memory loss, substance abuse and suicidal ideas. The patient has insomnia. The patient is not nervous/anxious.       Physical Exam  Laboratory/Imaging   Hemodynamics  Temp (24hrs), Av.8 °C (98.2 °F), Min:36.1 °C (97 °F), Max:37.4 °C (99.4 °F)   Temperature: 36.9 °C (98.4 °F)  Pulse  Av.4  Min: 49  Max: 136 Heart Rate (Monitored): 91  Blood Pressure : 101/72, NIBP: (!) 95/55      Respiratory      Respiration: 18, Pulse Oximetry: 94 %  [REMOVED] Chest Tube Group 1 (A) Left;Anterior APDL Locking pigtail  8-Tube Status / Drainage: Patent, [REMOVED] Chest Tube Group 1 (A) Left;Anterior APDL Locking pigtail  8-Device: Dry Closed Drainage System  Work Of Breathing / Effort: Moderate;Shallow  RUL Breath Sounds: Diminished, RML Breath Sounds: Diminished, RLL Breath Sounds: Diminished, MARINA Breath Sounds: Diminished, LLL Breath Sounds: Diminished    Fluids    Intake/Output Summary (Last 24 hours) at 18 1453  Last data filed at 18 0600   Gross per 24 hour   Intake              480 ml   Output              500 ml   Net              -20 ml       Nutrition  Orders Placed This Encounter   Procedures   • DIET ORDER     Standing Status:   Standing     Number of Occurrences:   1     Order Specific Question:   Diet:     Answer:   Cardiac [6]   • DIET NPO     Standing Status:   Standing     Number of Occurrences:   1     Order Specific Question:   Restrict to:     Answer:   Sips with Medications [3]     Physical Exam   Constitutional: She is oriented to person, place, and time. She appears well-developed and well-nourished. No distress.   HENT:   Head: Normocephalic.   Mouth/Throat: Oropharynx is clear and moist. No oropharyngeal exudate.   Eyes: Conjunctivae are normal. Pupils are equal, round, and reactive to light. No scleral icterus.   Neck: No JVD present.   Cardiovascular: Normal rate and regular rhythm.  Exam reveals no gallop and no friction  rub.    Murmur heard.  Pulmonary/Chest: Breath sounds normal. No stridor. No respiratory distress. She has no wheezes. She has no rales. She exhibits tenderness.   Left PM in place, L anterior chest tube in place   Abdominal: Soft. Bowel sounds are normal. She exhibits no distension. There is no tenderness. There is no rebound and no guarding.   Musculoskeletal: Normal range of motion. She exhibits edema (Minimal). She exhibits no tenderness or deformity.   Neurological: She is alert and oriented to person, place, and time. No cranial nerve deficit.   Skin: Skin is warm and dry. She is not diaphoretic. There is pallor.   SC fat loss  Ecchymosis of different site from bruising skin   Psychiatric: She has a normal mood and affect. Her behavior is normal. Judgment and thought content normal.       Recent Labs      05/07/18 0227 05/08/18   0056   WBC  9.3  9.8   RBC  4.52  4.16*   HEMOGLOBIN  13.6  12.6   HEMATOCRIT  42.3  37.8   MCV  93.6  90.9   MCH  30.1  30.3   MCHC  32.2*  33.3*   RDW  48.3  46.9   PLATELETCT  189  195   MPV  8.8*  8.7*     Recent Labs      05/06/18   0409  05/07/18 0227  05/08/18   0056   SODIUM  133*  133*  135   POTASSIUM  3.8  4.0  4.1   CHLORIDE  98  99  101   CO2  27  25  26   GLUCOSE  109*  106*  113*   BUN  23*  22  20   CREATININE  0.90  0.90  0.86   CALCIUM  8.7  9.0  8.6                      Assessment/Plan     Paroxysmal atrial fibrillation (HCC)- (present on admission)   Assessment & Plan    Initially presented to outside facility with symptomatic bradycardia , likely secondary to sotalol and diltiazem combination.   Cardioversion attempted in the ER, unsuccessful  Cardiology team on board  S/P PM placement on 5/7  Continue Diltiazem and metoprolol at this time per cardiology team.   Rate is not well controlled  Continue AC with Eliquis  Continue telemetry monitoring  Further recommendations per cardiology team         Cystitis- (present on admission)   Assessment & Plan     Uncomplicated  Has completed 3d ceftriaxone  Repeat procalcitonin in AM        Leukocytosis- (present on admission)   Assessment & Plan    Leukocytosis may be stress related from underlying cardiac issues  Will treat with empiric ceftriaxone x 3 days for uncomplicated cystitis  Will continue close clinical monitoring  procalcitonin in AM        Iatrogenic pneumothorax, left   Assessment & Plan    Developed after pacemaker was placed on 5/7  Repeat CXR on 5/8 showed increased size of ptx  Upsized chest tube done on 5/8  Repeat CXR in AM  CT to suction  IR is managing tube        GERD (gastroesophageal reflux disease)- (present on admission)   Assessment & Plan    Stop PPI while empiric abx used during hospital stay to limit risk of C Diff  Lactobacillus  Resume PPI in AM        Sepsis (HCC)- (present on admission)   Assessment & Plan    Bradycardia / tachycardia 2/2 cardiac conditions  Leukocytosis may be stress related from underlying cardiac issues, however elevated procalcitonin may suggest infection  Completed treatment with empiric ceftriaxone x 3 days for uncomplicated cystitis  Will continue close clinical monitoring        Hyperkalemia- (present on admission)   Assessment & Plan    That was treated at outside facility with calcium gluconate, insulin, dextrose, bicarb and it was resolved  Will monitor patient on telemetry  Daily K monitoring  Holding aldactone, BP is low normal          Quality-Core Measures   Reviewed items::  Labs reviewed, Medications reviewed and Radiology images reviewed  Robles catheter::  No Robles  DVT: Eliquis.  Ulcer Prophylaxis::  Not indicated  Assessed for rehabilitation services:  Patient was assess for and/or received rehabilitation services during this hospitalization

## 2018-05-08 NOTE — PROGRESS NOTES
Received pt back from IR. Pt put back on heart monitor, monitor room called to verify rate and rhythm. Per IR nurse Pt had 6-7 beats of VT during procedure. Will discuss with cardiologist.

## 2018-05-08 NOTE — PROGRESS NOTES
Monitor summary: Afib  with BBB and occasional PVC's, -/.12/-    HR frequently up to 160-180's post chest tube placement, remained 130's and below after cardiac and pain medications given.

## 2018-05-08 NOTE — PROGRESS NOTES
[facility] Hospitalist Progress Note    Date of Service: 5/7/2018    Chief Complaint  84 y.o. female admitted 5/5/2018 with bradycardia / hyperkalemia from OSH. Underlying history of A-Fib. Patient of Dr Maddox in outpatient setting. AC with Eliquis and was on Sotalol / Diltiazem prior to presentation. Patient presented to the outlying facility with fatigue, weakness and near syncope x 24 hours. She was found to have bradycardia with hyperkalemia there and transferred to our emergency room for further evaluation and management.     Interval Problem Update  Patient seen and evaluated on rounds  Doing well   Minimal fatigue and weakness  Feels significantly well compared to presentation  No other complaints  Agreeable to PPM at this time, planned for later today   Cardiology team on board  No other complaints per patient  Discussed with nursing, no concerns from nursing perspective    Consultants/Specialty  Cardiology     Disposition  Continue telemetry monitoring  Obtain PT/OT evaluation for disposition needs  Discharge once cleared from cardiology perspective         Review of Systems   Constitutional: Positive for malaise/fatigue. Negative for chills, diaphoresis and fever.   HENT: Negative for hearing loss and tinnitus.    Eyes: Negative for blurred vision and double vision.   Respiratory: Negative for cough, hemoptysis, sputum production, shortness of breath and wheezing.    Cardiovascular: Positive for leg swelling (chronic). Negative for chest pain, palpitations, orthopnea, claudication and PND.   Gastrointestinal: Negative for abdominal pain, blood in stool, constipation, diarrhea, heartburn, melena, nausea and vomiting.   Genitourinary: Negative for dysuria, flank pain, frequency, hematuria and urgency.   Musculoskeletal: Positive for back pain (from being in hospital bed per patient). Negative for falls, joint pain, myalgias and neck pain.   Skin: Negative for itching and rash.   Neurological: Positive for  weakness. Negative for dizziness, tingling, tremors, sensory change, speech change, focal weakness, seizures, loss of consciousness and headaches.   Psychiatric/Behavioral: Negative for depression, hallucinations, memory loss, substance abuse and suicidal ideas. The patient is not nervous/anxious and does not have insomnia.       Physical Exam  Laboratory/Imaging   Hemodynamics  Temp (24hrs), Av.7 °C (98.1 °F), Min:36.3 °C (97.4 °F), Max:37.4 °C (99.4 °F)   Temperature: 37.1 °C (98.7 °F)  Pulse  Av.7  Min: 49  Max: 136   Blood Pressure : 155/108 (RN notified)      Respiratory      Respiration: 16, Pulse Oximetry: 94 %  Chest Tube Group 1 (A) Left;Anterior APDL Locking pigtail  8-Tube Status / Drainage: Patent, Chest Tube Group 1 (A) Left;Anterior APDL Locking pigtail  8-Device: Dry Closed Drainage System  Work Of Breathing / Effort: Moderate;Shallow  RUL Breath Sounds: Diminished, RML Breath Sounds: Diminished, RLL Breath Sounds: Diminished, MARINA Breath Sounds: Diminished, LLL Breath Sounds: Diminished    Fluids    Intake/Output Summary (Last 24 hours) at 18 2305  Last data filed at 18 2000   Gross per 24 hour   Intake              240 ml   Output              200 ml   Net               40 ml       Nutrition  Orders Placed This Encounter   Procedures   • DIET ORDER     Standing Status:   Standing     Number of Occurrences:   1     Order Specific Question:   Diet:     Answer:   Cardiac [6]     Physical Exam   Constitutional: She is oriented to person, place, and time. She appears well-developed and well-nourished. No distress.   HENT:   Head: Normocephalic.   Mouth/Throat: Oropharynx is clear and moist. No oropharyngeal exudate.   Eyes: Conjunctivae are normal. Pupils are equal, round, and reactive to light. No scleral icterus.   Neck: No JVD present.   Cardiovascular: Normal rate and regular rhythm.  Exam reveals no gallop and no friction rub.    Murmur heard.  Pulmonary/Chest: Breath sounds  normal. No stridor. No respiratory distress. She has no wheezes. She has no rales.   Abdominal: Soft. Bowel sounds are normal. She exhibits no distension. There is no tenderness. There is no rebound and no guarding.   Musculoskeletal: Normal range of motion. She exhibits edema (Minimal). She exhibits no tenderness or deformity.   Neurological: She is alert and oriented to person, place, and time. No cranial nerve deficit.   Skin: Skin is warm and dry. She is not diaphoretic.   SC fat loss  Ecchymosis of different site from bruising skin   Psychiatric: She has a normal mood and affect. Her behavior is normal. Judgment and thought content normal.       Recent Labs      05/05/18   0530  05/07/18   0227   WBC  12.6*  9.3   RBC  4.68  4.52   HEMOGLOBIN  14.3  13.6   HEMATOCRIT  40.1  42.3   MCV  85.7  93.6   MCH  30.6  30.1   MCHC  35.7*  32.2*   RDW  42.3  48.3   PLATELETCT  279  189   MPV  8.6*  8.8*     Recent Labs      05/05/18   0530  05/06/18   0409  05/07/18   0227   SODIUM  133*  133*  133*   POTASSIUM  3.8  3.8  4.0   CHLORIDE  95*  98  99   CO2  30  27  25   GLUCOSE  68  109*  106*   BUN  27*  23*  22   CREATININE  0.92  0.90  0.90   CALCIUM  10.0  8.7  9.0     Recent Labs      05/05/18   0530   APTT  30.7   INR  1.07     Recent Labs      05/05/18   0530   BNPBTYPENAT  584*              Assessment/Plan     Paroxysmal atrial fibrillation (HCC)- (present on admission)   Assessment & Plan    Initially presented to outside facility with symptomatic bradycardia , likely secondary to sotalol and diltiazem combination  Cardioversion attempted in the ER, unsuccessful  Cardiology team on board  Plan for PPM placement today  Continue Sotalol and metoprolol at this time per cardiology team  Continue AC with Eliquis  Continue telemetry monitoring  Further recommendations per cardiology team         Cystitis- (present on admission)   Assessment & Plan    Uncomplicated  Ceftriaxone x 3 days        Leukocytosis- (present on  admission)   Assessment & Plan    Leukocytosis may be stress related from underlying cardiac issues  Will treat with empiric ceftriaxone x 3 days for uncomplicated cystitis  Will continue close clinical monitoring        GERD (gastroesophageal reflux disease)- (present on admission)   Assessment & Plan    Stop PPI while empiric abx used during hospital stay to limit risk of C Diff        Sepsis (HCC)- (present on admission)   Assessment & Plan    This has been ruled out as a diagnosis  Bradycardia / tachycardia 2/2 cardiac conditions  Leukocytosis may be stress related from underlying cardiac issues  Will treat with empiric ceftriaxone x 3 days for uncomplicated cystitis  Will continue close clinical monitoring        Hyperkalemia- (present on admission)   Assessment & Plan    That was treated at outside facility with calcium gluconate, insulin, dextrose, bicarb and it was resolved  Will monitor patient on telemetry  Daily K monitoring  Holding aldactone           Quality-Core Measures   Reviewed items::  Labs reviewed, Medications reviewed and Radiology images reviewed  Robles catheter::  No Robles  DVT: Eliquis.  Ulcer Prophylaxis::  Not indicated  Antibiotics:  Treating active infection/contamination beyond 24 hours perioperative coverage  Assessed for rehabilitation services:  Patient was assess for and/or received rehabilitation services during this hospitalization

## 2018-05-08 NOTE — ASSESSMENT & PLAN NOTE
Developed after pacemaker was placed on 5/7  Repeat CXR on 5/8 showed increased size of ptx  Upsized chest tube done on 5/8 and CXR on 5/9 shows resolution of PTX, CT, clamped and repeat imaging with slight residual pneumo.  5/10: CXR unchanged, chest tube to be removed  Repeat CXR in AM  Doing well on RA

## 2018-05-08 NOTE — THERAPY
Eval attempted.  Pt has had multiple procedures including replacement of chest tube for a large one, pt declined any OOB activity this pm.  Will re-assess in am.  Nsg advised to hold OT eval today.

## 2018-05-09 ENCOUNTER — APPOINTMENT (OUTPATIENT)
Dept: RADIOLOGY | Facility: MEDICAL CENTER | Age: 83
DRG: 243 | End: 2018-05-09
Attending: NURSE PRACTITIONER
Payer: MEDICARE

## 2018-05-09 LAB
ALBUMIN SERPL BCP-MCNC: 3.1 G/DL (ref 3.2–4.9)
BASOPHILS # BLD AUTO: 0.8 % (ref 0–1.8)
BASOPHILS # BLD: 0.07 K/UL (ref 0–0.12)
BUN SERPL-MCNC: 28 MG/DL (ref 8–22)
CALCIUM SERPL-MCNC: 8.6 MG/DL (ref 8.5–10.5)
CHLORIDE SERPL-SCNC: 98 MMOL/L (ref 96–112)
CO2 SERPL-SCNC: 24 MMOL/L (ref 20–33)
CREAT SERPL-MCNC: 0.95 MG/DL (ref 0.5–1.4)
EOSINOPHIL # BLD AUTO: 0.28 K/UL (ref 0–0.51)
EOSINOPHIL NFR BLD: 3.1 % (ref 0–6.9)
ERYTHROCYTE [DISTWIDTH] IN BLOOD BY AUTOMATED COUNT: 47.5 FL (ref 35.9–50)
GLUCOSE SERPL-MCNC: 128 MG/DL (ref 65–99)
HCT VFR BLD AUTO: 37.5 % (ref 37–47)
HGB BLD-MCNC: 12.2 G/DL (ref 12–16)
IMM GRANULOCYTES # BLD AUTO: 0.03 K/UL (ref 0–0.11)
IMM GRANULOCYTES NFR BLD AUTO: 0.3 % (ref 0–0.9)
LYMPHOCYTES # BLD AUTO: 2.16 K/UL (ref 1–4.8)
LYMPHOCYTES NFR BLD: 24.1 % (ref 22–41)
MCH RBC QN AUTO: 30 PG (ref 27–33)
MCHC RBC AUTO-ENTMCNC: 32.5 G/DL (ref 33.6–35)
MCV RBC AUTO: 92.1 FL (ref 81.4–97.8)
MONOCYTES # BLD AUTO: 1.28 K/UL (ref 0–0.85)
MONOCYTES NFR BLD AUTO: 14.3 % (ref 0–13.4)
NEUTROPHILS # BLD AUTO: 5.15 K/UL (ref 2–7.15)
NEUTROPHILS NFR BLD: 57.4 % (ref 44–72)
NRBC # BLD AUTO: 0 K/UL
NRBC BLD-RTO: 0 /100 WBC
PHOSPHATE SERPL-MCNC: 3.9 MG/DL (ref 2.5–4.5)
PLATELET # BLD AUTO: 181 K/UL (ref 164–446)
PMV BLD AUTO: 8.9 FL (ref 9–12.9)
POTASSIUM SERPL-SCNC: 4.2 MMOL/L (ref 3.6–5.5)
PROCALCITONIN SERPL-MCNC: 0.22 NG/ML
RBC # BLD AUTO: 4.07 M/UL (ref 4.2–5.4)
SODIUM SERPL-SCNC: 131 MMOL/L (ref 135–145)
WBC # BLD AUTO: 9 K/UL (ref 4.8–10.8)

## 2018-05-09 PROCEDURE — 700101 HCHG RX REV CODE 250: Performed by: INTERNAL MEDICINE

## 2018-05-09 PROCEDURE — A9270 NON-COVERED ITEM OR SERVICE: HCPCS | Performed by: INTERNAL MEDICINE

## 2018-05-09 PROCEDURE — 80069 RENAL FUNCTION PANEL: CPT

## 2018-05-09 PROCEDURE — 71045 X-RAY EXAM CHEST 1 VIEW: CPT

## 2018-05-09 PROCEDURE — A9270 NON-COVERED ITEM OR SERVICE: HCPCS | Performed by: NURSE PRACTITIONER

## 2018-05-09 PROCEDURE — 700102 HCHG RX REV CODE 250 W/ 637 OVERRIDE(OP): Performed by: INTERNAL MEDICINE

## 2018-05-09 PROCEDURE — 84145 PROCALCITONIN (PCT): CPT

## 2018-05-09 PROCEDURE — 36415 COLL VENOUS BLD VENIPUNCTURE: CPT

## 2018-05-09 PROCEDURE — 770020 HCHG ROOM/CARE - TELE (206)

## 2018-05-09 PROCEDURE — 700102 HCHG RX REV CODE 250 W/ 637 OVERRIDE(OP): Performed by: NURSE PRACTITIONER

## 2018-05-09 PROCEDURE — 85025 COMPLETE CBC W/AUTO DIFF WBC: CPT

## 2018-05-09 PROCEDURE — 99232 SBSQ HOSP IP/OBS MODERATE 35: CPT | Performed by: INTERNAL MEDICINE

## 2018-05-09 RX ADMIN — LACTOBACILLUS ACIDOPHILUS / LACTOBACILLUS BULGARICUS 1 PACKET: 100 MILLION CFU STRENGTH GRANULES at 13:01

## 2018-05-09 RX ADMIN — HYDROCODONE BITARTRATE AND ACETAMINOPHEN 1 TABLET: 7.5; 325 TABLET ORAL at 18:00

## 2018-05-09 RX ADMIN — HYDROCODONE BITARTRATE AND ACETAMINOPHEN 1 TABLET: 7.5; 325 TABLET ORAL at 13:01

## 2018-05-09 RX ADMIN — OMEPRAZOLE 20 MG: 20 CAPSULE, DELAYED RELEASE ORAL at 05:40

## 2018-05-09 RX ADMIN — MELOXICAM 7.5 MG: 7.5 TABLET ORAL at 05:40

## 2018-05-09 RX ADMIN — METOPROLOL TARTRATE 100 MG: 50 TABLET, FILM COATED ORAL at 18:00

## 2018-05-09 RX ADMIN — DILTIAZEM HYDROCHLORIDE 60 MG: 30 TABLET, FILM COATED ORAL at 18:00

## 2018-05-09 RX ADMIN — LACTOBACILLUS ACIDOPHILUS / LACTOBACILLUS BULGARICUS 1 PACKET: 100 MILLION CFU STRENGTH GRANULES at 08:05

## 2018-05-09 RX ADMIN — DILTIAZEM HYDROCHLORIDE 60 MG: 30 TABLET, FILM COATED ORAL at 23:41

## 2018-05-09 RX ADMIN — APIXABAN 5 MG: 5 TABLET, FILM COATED ORAL at 05:39

## 2018-05-09 RX ADMIN — APIXABAN 5 MG: 5 TABLET, FILM COATED ORAL at 18:01

## 2018-05-09 RX ADMIN — LACTOBACILLUS ACIDOPHILUS / LACTOBACILLUS BULGARICUS 1 PACKET: 100 MILLION CFU STRENGTH GRANULES at 18:01

## 2018-05-09 RX ADMIN — HYDROCODONE BITARTRATE AND ACETAMINOPHEN 1 TABLET: 7.5; 325 TABLET ORAL at 06:38

## 2018-05-09 RX ADMIN — IPRATROPIUM BROMIDE 1 SPRAY: 42 SPRAY NASAL at 05:39

## 2018-05-09 RX ADMIN — DILTIAZEM HYDROCHLORIDE 60 MG: 30 TABLET, FILM COATED ORAL at 05:39

## 2018-05-09 RX ADMIN — DILTIAZEM HYDROCHLORIDE 60 MG: 30 TABLET, FILM COATED ORAL at 13:01

## 2018-05-09 RX ADMIN — HYDROCODONE BITARTRATE AND ACETAMINOPHEN 1 TABLET: 7.5; 325 TABLET ORAL at 22:05

## 2018-05-09 RX ADMIN — METOPROLOL TARTRATE 100 MG: 50 TABLET, FILM COATED ORAL at 05:39

## 2018-05-09 RX ADMIN — ZOLPIDEM TARTRATE 2.5 MG: 5 TABLET ORAL at 22:06

## 2018-05-09 ASSESSMENT — ENCOUNTER SYMPTOMS
TINGLING: 0
DOUBLE VISION: 0
COUGH: 0
PSYCHIATRIC NEGATIVE: 1
TREMORS: 0
ORTHOPNEA: 0
DEPRESSION: 0
SPEECH CHANGE: 0
FALLS: 0
DIARRHEA: 0
PALPITATIONS: 0
DIAPHORESIS: 0
FLANK PAIN: 0
SENSORY CHANGE: 0
PND: 0
BRUISES/BLEEDS EASILY: 0
FEVER: 0
CONSTIPATION: 0
SHORTNESS OF BREATH: 0
BLOOD IN STOOL: 0
VOMITING: 0
HEADACHES: 0
NAUSEA: 0
LOSS OF CONSCIOUSNESS: 0
ABDOMINAL PAIN: 0
HALLUCINATIONS: 0
SORE THROAT: 0
CHILLS: 0
HEMOPTYSIS: 0
BLURRED VISION: 0
WEAKNESS: 1
NERVOUS/ANXIOUS: 0
BACK PAIN: 1
HEARTBURN: 0
NECK PAIN: 0
SEIZURES: 0
DIZZINESS: 0
MEMORY LOSS: 0
SPUTUM PRODUCTION: 0
FOCAL WEAKNESS: 0
WHEEZING: 0
CLAUDICATION: 0
MYALGIAS: 0
INSOMNIA: 1

## 2018-05-09 ASSESSMENT — PAIN SCALES - GENERAL
PAINLEVEL_OUTOF10: 0
PAINLEVEL_OUTOF10: 7
PAINLEVEL_OUTOF10: 0
PAINLEVEL_OUTOF10: 7
PAINLEVEL_OUTOF10: 0
PAINLEVEL_OUTOF10: 7
PAINLEVEL_OUTOF10: 7
PAINLEVEL_OUTOF10: 0
PAINLEVEL_OUTOF10: 0
PAINLEVEL_OUTOF10: 7

## 2018-05-09 ASSESSMENT — LIFESTYLE VARIABLES: SUBSTANCE_ABUSE: 0

## 2018-05-09 ASSESSMENT — PATIENT HEALTH QUESTIONNAIRE - PHQ9
SUM OF ALL RESPONSES TO PHQ9 QUESTIONS 1 AND 2: 0
1. LITTLE INTEREST OR PLEASURE IN DOING THINGS: NOT AT ALL
2. FEELING DOWN, DEPRESSED, IRRITABLE, OR HOPELESS: NOT AT ALL

## 2018-05-09 NOTE — CARE PLAN
Problem: Communication  Goal: The ability to communicate needs accurately and effectively will improve  Outcome: PROGRESSING AS EXPECTED  Explained POC, medications, activity level and diet. No plan for ablasion per Alysha Mccloud NP with EP. Family & pt updated    Problem: Safety  Goal: Will remain free from injury  Outcome: PROGRESSING AS EXPECTED  Fall precautions per protocol. Family & pt updated and voiced understanding.    Problem: Pain Management  Goal: Pain level will decrease to patient's comfort goal  Outcome: PROGRESSING AS EXPECTED      Problem: Respiratory:  Goal: Respiratory status will improve  Outcome: PROGRESSING AS EXPECTED  Chest tube to waterseal per Radiology. No air leak at present time. Will monitor for dyspnea, tachycardia ( any respiratory changes). CXR planned for this afternoon. Will follow up with radiology - possibly removing chest tube this evening. Family & patient updated.

## 2018-05-09 NOTE — PROGRESS NOTES
VM received from VAUS Mccloud asking for assistance with chest tube placed for iatrogenic lt PNTX.  XR reviewed with Dr. Armas showing no evidence of Lt PNTX. Per bedside RN, no air leak on exam. Chest tube to water seal, will repeat CXR and if no PNTX will attempt clamping trial.

## 2018-05-09 NOTE — PROGRESS NOTES
Monitor Summary  afib/Paced   Alternating varrient beats, BBB, occasional PVC,  Rare triplet, tach up to 150 non-sustained  .00/.08/.00

## 2018-05-09 NOTE — PROGRESS NOTES
Cardiology Progress Note               Author: Alysha Mccloud Date & Time created: 5/9/2018  10:37 AM     Interval History:  Patient seen on EPS rounds.  84-year-old woman from [facility] who has A. fib on sotalol and with diltiazem had junctional bradycardia then significant tachycardia on sotalol and metoprolol failed amiodarone in the past and failed cardioversion ×2 in the emergency room. History of paroxysmal A. Fib on sotalol, on Eliquis, hypertension, back pain, arthritis     5/7/18 per Dr Jones:  IMPLANTED DEVICE INFORMATION:  Pulse generator is a SJM model CR0104  Serial # [id number]     LEAD INFORMATION:  1)Right atrial lead is a SJM model #[id number] , serial #[id number], P wave 0.4 millivolts (AF), pacing impedance 540 Ohms.     2)Right ventricular lead is a SJM model #[id number]/52, serial #[id number], R wave 12.0 millivolts, threshold 0.5 Volts at 0.5 milliseconds, pacing impedance 790 Ohms.     DEVICE PROGRAMMING:  DDD 60 -120 ppm     FLUOROSCOPY TIME: 2.7 minutes     IMPRESSIONS:  1. Successful dual chamber pacemaker implantation     COMPLICATION(S): Ptx.     Patient underwent IR insertion of larger chest tube yesterday.  CXR Ptx appears resolved.  Chief Complaint:  AF  Junctional rhythm    Review of Systems   Constitutional: Negative for chills and fever.   HENT: Negative for sore throat.         No difficulty swallowing   Eyes: Negative for blurred vision and double vision.   Respiratory: Negative for cough, shortness of breath and wheezing.    Cardiovascular: Negative for chest pain, palpitations, orthopnea, claudication, leg swelling and PND.   Gastrointestinal: Negative for abdominal pain, blood in stool, heartburn, nausea and vomiting.   Genitourinary: Negative for dysuria, frequency, hematuria and urgency.   Musculoskeletal: Negative for myalgias.   Skin: Negative.    Neurological: Negative for dizziness, speech change, focal weakness, loss of consciousness and headaches.   Endo/Heme/Allergies: Does  not bruise/bleed easily.   Psychiatric/Behavioral: Negative.        Physical Exam   Constitutional: She is oriented to person, place, and time. She appears well-developed and well-nourished.   HENT:   Head: Normocephalic and atraumatic.   Eyes: Pupils are equal, round, and reactive to light.   Neck: Normal range of motion. Neck supple. No thyromegaly present.   Cardiovascular: Normal rate, regular rhythm, normal heart sounds and intact distal pulses.  Exam reveals no gallop and no friction rub.    No murmur heard.  Pulmonary/Chest: Effort normal and breath sounds normal. No respiratory distress. She has no wheezes. She has no rales. She exhibits no tenderness.   Device site and CT site dressing in place appear uncomplicated.   Abdominal: Soft. Bowel sounds are normal. She exhibits no distension. There is no tenderness. There is no guarding.   Musculoskeletal: Normal range of motion. She exhibits no edema.   Neurological: She is alert and oriented to person, place, and time.   Skin: Skin is warm and dry.   Psychiatric: She has a normal mood and affect.       Hemodynamics:  Temp (24hrs), Av.5 °C (97.7 °F), Min:36.2 °C (97.2 °F), Max:36.9 °C (98.4 °F)  Temperature: 36.3 °C (97.3 °F)  Pulse  Av.5  Min: 49  Max: 136Heart Rate (Monitored): 91  Blood Pressure : 115/70, NIBP: (!) 95/55     Respiratory:    Respiration: 19, Pulse Oximetry: 95 %  Chest Tube Group 2 (B) Left;Upper [employer] Flexima APDL 10F x 25cm  REF [id number]  LOT [id number]  10-Tube Status / Drainage: Sutured in Place;Patent;Scant;Serosanguinous, Chest Tube Group 2 (B) Left;Upper [employer] Flexima APDL 10F x 25cm  REF [id number]  LOT [id number]  10-Device: Air Leak Present;Dry Closed Drainage System;Suction 20 cm Water     RUL Breath Sounds: Diminished, RML Breath Sounds: Diminished, RLL Breath Sounds: Diminished, MARINA Breath Sounds: Diminished, LLL Breath Sounds: Diminished  Fluids:  Date 18 0700 - 05/10/18 0659   Shift 8040-6055  [id number] 0244-8624 24 Hour Total   I  N  T  A  K  E   P.O. 240   240    Shift Total 240   240   O  U  T  P  U  T   Shift Total       Weight (kg) 64.5 64.5 64.5 64.5          GI/Nutrition:  Orders Placed This Encounter   Procedures   • DIET NPO     Standing Status:   Standing     Number of Occurrences:   1     Order Specific Question:   Restrict to:     Answer:   Sips with Medications [3]     Lab Results:  Recent Labs      05/07/18 0227 05/08/18 0056 05/09/18   0106   WBC  9.3  9.8  9.0   RBC  4.52  4.16*  4.07*   HEMOGLOBIN  13.6  12.6  12.2   HEMATOCRIT  42.3  37.8  37.5   MCV  93.6  90.9  92.1   MCH  30.1  30.3  30.0   MCHC  32.2*  33.3*  32.5*   RDW  48.3  46.9  47.5   PLATELETCT  189  195  181   MPV  8.8*  8.7*  8.9*     Recent Labs      05/07/18 0227 05/08/18 0056 05/09/18   0106   SODIUM  133*  135  131*   POTASSIUM  4.0  4.1  4.2   CHLORIDE  99  101  98   CO2  25  26  24   GLUCOSE  106*  113*  128*   BUN  22  20  28*   CREATININE  0.90  0.86  0.95   CALCIUM  9.0  8.6  8.6                         Medical Decision Making, by Problem:  Active Hospital Problems    Diagnosis   • Paroxysmal atrial fibrillation (HCC) [I48.0]   • Leukocytosis [D72.829]   • Cystitis [N30.90]   • GERD (gastroesophageal reflux disease) [K21.9]   • Hyperkalemia [E87.5]   • Sepsis (HCC) [A41.9]   • Iatrogenic pneumothorax, left [J95.811]       Plan:  Anticipate clamping of CT tube and removal today by IR.  I have called Guerita Tran in IR an left message for her to assist in tube evaluation and removal.  AF VRR well controlled. Will need to get patient up in halls and see how well her rates are controlled to determine if AVN modification needed.  Anticipate pacemaker check on Friday and home if no AVN modification needed.  Otherwise AVN modification on Friday and home Saturday.      Quality-Core Measures   Reviewed items::  EKG reviewed, Labs reviewed, Medications reviewed and Radiology images reviewed

## 2018-05-09 NOTE — THERAPY
noted that per telemety, pt is having irregular HR 90s<>140s with PVCs/couplets, etc and is currently supine/rest in bed; will hold until more medically stable for increasing activity given recent PPM placement and recent multiple CT insertions

## 2018-05-09 NOTE — PROGRESS NOTES
XR reviewed with no recurrent PNTX with chest tube to water seal. Pt's RN states pt has no complaints with tube to suction.     Clamp tube, XR to be repeated in 1 hr. If no PNTX, OK to remove tube by IR staff. Dr. Armas on call x [phone].

## 2018-05-10 ENCOUNTER — APPOINTMENT (OUTPATIENT)
Dept: RADIOLOGY | Facility: MEDICAL CENTER | Age: 83
DRG: 243 | End: 2018-05-10
Attending: INTERNAL MEDICINE
Payer: MEDICARE

## 2018-05-10 LAB
ALBUMIN SERPL BCP-MCNC: 3.6 G/DL (ref 3.2–4.9)
BASOPHILS # BLD AUTO: 1.2 % (ref 0–1.8)
BASOPHILS # BLD: 0.08 K/UL (ref 0–0.12)
BUN SERPL-MCNC: 28 MG/DL (ref 8–22)
CALCIUM SERPL-MCNC: 9.2 MG/DL (ref 8.5–10.5)
CHLORIDE SERPL-SCNC: 99 MMOL/L (ref 96–112)
CO2 SERPL-SCNC: 22 MMOL/L (ref 20–33)
CREAT SERPL-MCNC: 0.98 MG/DL (ref 0.5–1.4)
EOSINOPHIL # BLD AUTO: 0.3 K/UL (ref 0–0.51)
EOSINOPHIL NFR BLD: 4.6 % (ref 0–6.9)
ERYTHROCYTE [DISTWIDTH] IN BLOOD BY AUTOMATED COUNT: 45.9 FL (ref 35.9–50)
GLUCOSE SERPL-MCNC: 103 MG/DL (ref 65–99)
HCT VFR BLD AUTO: 35.3 % (ref 37–47)
HGB BLD-MCNC: 12 G/DL (ref 12–16)
IMM GRANULOCYTES # BLD AUTO: 0.05 K/UL (ref 0–0.11)
IMM GRANULOCYTES NFR BLD AUTO: 0.8 % (ref 0–0.9)
LYMPHOCYTES # BLD AUTO: 1.56 K/UL (ref 1–4.8)
LYMPHOCYTES NFR BLD: 24 % (ref 22–41)
MCH RBC QN AUTO: 30.8 PG (ref 27–33)
MCHC RBC AUTO-ENTMCNC: 34 G/DL (ref 33.6–35)
MCV RBC AUTO: 90.5 FL (ref 81.4–97.8)
MONOCYTES # BLD AUTO: 0.92 K/UL (ref 0–0.85)
MONOCYTES NFR BLD AUTO: 14.2 % (ref 0–13.4)
NEUTROPHILS # BLD AUTO: 3.59 K/UL (ref 2–7.15)
NEUTROPHILS NFR BLD: 55.2 % (ref 44–72)
NRBC # BLD AUTO: 0 K/UL
NRBC BLD-RTO: 0 /100 WBC
PHOSPHATE SERPL-MCNC: 4.1 MG/DL (ref 2.5–4.5)
PLATELET # BLD AUTO: 180 K/UL (ref 164–446)
PMV BLD AUTO: 8.9 FL (ref 9–12.9)
POTASSIUM SERPL-SCNC: 5 MMOL/L (ref 3.6–5.5)
RBC # BLD AUTO: 3.9 M/UL (ref 4.2–5.4)
SODIUM SERPL-SCNC: 130 MMOL/L (ref 135–145)
WBC # BLD AUTO: 6.5 K/UL (ref 4.8–10.8)

## 2018-05-10 PROCEDURE — G8988 SELF CARE GOAL STATUS: HCPCS | Mod: CI

## 2018-05-10 PROCEDURE — 770020 HCHG ROOM/CARE - TELE (206)

## 2018-05-10 PROCEDURE — 700102 HCHG RX REV CODE 250 W/ 637 OVERRIDE(OP): Performed by: INTERNAL MEDICINE

## 2018-05-10 PROCEDURE — A9270 NON-COVERED ITEM OR SERVICE: HCPCS | Performed by: NURSE PRACTITIONER

## 2018-05-10 PROCEDURE — 85025 COMPLETE CBC W/AUTO DIFF WBC: CPT

## 2018-05-10 PROCEDURE — G8987 SELF CARE CURRENT STATUS: HCPCS | Mod: CJ

## 2018-05-10 PROCEDURE — 71045 X-RAY EXAM CHEST 1 VIEW: CPT

## 2018-05-10 PROCEDURE — 97165 OT EVAL LOW COMPLEX 30 MIN: CPT

## 2018-05-10 PROCEDURE — A9270 NON-COVERED ITEM OR SERVICE: HCPCS | Performed by: INTERNAL MEDICINE

## 2018-05-10 PROCEDURE — 80069 RENAL FUNCTION PANEL: CPT

## 2018-05-10 PROCEDURE — G8979 MOBILITY GOAL STATUS: HCPCS | Mod: CI

## 2018-05-10 PROCEDURE — 97162 PT EVAL MOD COMPLEX 30 MIN: CPT

## 2018-05-10 PROCEDURE — 36415 COLL VENOUS BLD VENIPUNCTURE: CPT

## 2018-05-10 PROCEDURE — 99232 SBSQ HOSP IP/OBS MODERATE 35: CPT | Performed by: INTERNAL MEDICINE

## 2018-05-10 PROCEDURE — 700101 HCHG RX REV CODE 250: Performed by: INTERNAL MEDICINE

## 2018-05-10 PROCEDURE — G8978 MOBILITY CURRENT STATUS: HCPCS | Mod: CJ

## 2018-05-10 PROCEDURE — 700102 HCHG RX REV CODE 250 W/ 637 OVERRIDE(OP): Performed by: NURSE PRACTITIONER

## 2018-05-10 RX ADMIN — APIXABAN 5 MG: 5 TABLET, FILM COATED ORAL at 05:09

## 2018-05-10 RX ADMIN — LACTOBACILLUS ACIDOPHILUS / LACTOBACILLUS BULGARICUS 1 PACKET: 100 MILLION CFU STRENGTH GRANULES at 12:59

## 2018-05-10 RX ADMIN — OMEPRAZOLE 20 MG: 20 CAPSULE, DELAYED RELEASE ORAL at 05:09

## 2018-05-10 RX ADMIN — DILTIAZEM HYDROCHLORIDE 60 MG: 30 TABLET, FILM COATED ORAL at 05:10

## 2018-05-10 RX ADMIN — DILTIAZEM HYDROCHLORIDE 60 MG: 30 TABLET, FILM COATED ORAL at 12:59

## 2018-05-10 RX ADMIN — METOPROLOL TARTRATE 100 MG: 50 TABLET, FILM COATED ORAL at 05:09

## 2018-05-10 RX ADMIN — APIXABAN 5 MG: 5 TABLET, FILM COATED ORAL at 17:41

## 2018-05-10 RX ADMIN — HYDROCODONE BITARTRATE AND ACETAMINOPHEN 1 TABLET: 7.5; 325 TABLET ORAL at 08:41

## 2018-05-10 RX ADMIN — HYDROCODONE BITARTRATE AND ACETAMINOPHEN 1 TABLET: 7.5; 325 TABLET ORAL at 03:11

## 2018-05-10 RX ADMIN — DILTIAZEM HYDROCHLORIDE 60 MG: 30 TABLET, FILM COATED ORAL at 17:41

## 2018-05-10 RX ADMIN — MELOXICAM 7.5 MG: 7.5 TABLET ORAL at 05:09

## 2018-05-10 RX ADMIN — LACTOBACILLUS ACIDOPHILUS / LACTOBACILLUS BULGARICUS 1 PACKET: 100 MILLION CFU STRENGTH GRANULES at 17:41

## 2018-05-10 RX ADMIN — IPRATROPIUM BROMIDE 1 SPRAY: 42 SPRAY NASAL at 05:10

## 2018-05-10 RX ADMIN — HYDROCODONE BITARTRATE AND ACETAMINOPHEN 1 TABLET: 7.5; 325 TABLET ORAL at 12:59

## 2018-05-10 RX ADMIN — LACTOBACILLUS ACIDOPHILUS / LACTOBACILLUS BULGARICUS 1 PACKET: 100 MILLION CFU STRENGTH GRANULES at 08:41

## 2018-05-10 RX ADMIN — METOPROLOL TARTRATE 100 MG: 50 TABLET, FILM COATED ORAL at 17:41

## 2018-05-10 RX ADMIN — HYDROCODONE BITARTRATE AND ACETAMINOPHEN 1 TABLET: 7.5; 325 TABLET ORAL at 20:28

## 2018-05-10 ASSESSMENT — ENCOUNTER SYMPTOMS
COUGH: 0
CONSTIPATION: 0
FLANK PAIN: 0
DEPRESSION: 0
SHORTNESS OF BREATH: 0
SORE THROAT: 0
BLOOD IN STOOL: 0
ABDOMINAL PAIN: 0
HEARTBURN: 0
NERVOUS/ANXIOUS: 0
LOSS OF CONSCIOUSNESS: 0
WEAKNESS: 1
HEMOPTYSIS: 0
PND: 0
NAUSEA: 0
CLAUDICATION: 0
SPUTUM PRODUCTION: 0
FEVER: 0
HEADACHES: 0
VOMITING: 0
SENSORY CHANGE: 0
PSYCHIATRIC NEGATIVE: 1
MYALGIAS: 0
PALPITATIONS: 0
TREMORS: 0
INSOMNIA: 1
NECK PAIN: 0
BLURRED VISION: 0
DOUBLE VISION: 0
BACK PAIN: 1
FALLS: 0
BRUISES/BLEEDS EASILY: 0
DIARRHEA: 0
ORTHOPNEA: 0
SEIZURES: 0
FOCAL WEAKNESS: 0
DIZZINESS: 0
DIAPHORESIS: 0
WHEEZING: 0
CHILLS: 0
TINGLING: 0
MEMORY LOSS: 0
HALLUCINATIONS: 0
SPEECH CHANGE: 0

## 2018-05-10 ASSESSMENT — COGNITIVE AND FUNCTIONAL STATUS - GENERAL
DRESSING REGULAR LOWER BODY CLOTHING: A LITTLE
MOVING TO AND FROM BED TO CHAIR: A LITTLE
TURNING FROM BACK TO SIDE WHILE IN FLAT BAD: A LITTLE
DAILY ACTIVITIY SCORE: 22
MOBILITY SCORE: 20
MOVING FROM LYING ON BACK TO SITTING ON SIDE OF FLAT BED: A LITTLE
CLIMB 3 TO 5 STEPS WITH RAILING: A LITTLE
HELP NEEDED FOR BATHING: A LITTLE
SUGGESTED CMS G CODE MODIFIER DAILY ACTIVITY: CJ
SUGGESTED CMS G CODE MODIFIER MOBILITY: CJ

## 2018-05-10 ASSESSMENT — GAIT ASSESSMENTS
ASSISTIVE DEVICE: FRONT WHEEL WALKER
DISTANCE (FEET): 200
DEVIATION: OTHER (COMMENT)
GAIT LEVEL OF ASSIST: STAND BY ASSIST

## 2018-05-10 ASSESSMENT — PAIN SCALES - GENERAL
PAINLEVEL_OUTOF10: 6
PAINLEVEL_OUTOF10: 7
PAINLEVEL_OUTOF10: 6
PAINLEVEL_OUTOF10: 7
PAINLEVEL_OUTOF10: ASSUMED PAIN PRESENT
PAINLEVEL_OUTOF10: 5
PAINLEVEL_OUTOF10: 6
PAINLEVEL_OUTOF10: 4

## 2018-05-10 ASSESSMENT — LIFESTYLE VARIABLES: SUBSTANCE_ABUSE: 0

## 2018-05-10 ASSESSMENT — ACTIVITIES OF DAILY LIVING (ADL): TOILETING: INDEPENDENT

## 2018-05-10 NOTE — THERAPY
"Occupational Therapy Evaluation completed.   Functional Status:  Up in chair upon entry, SBA w/sit>stand walking in room w/fww, SBA w/toilet txf no grab bars no LOB, SBA w/grooming standing at sink and walking in room w/fww. Remained up in chair w/call light and tray table. Discussed home safety   Plan of Care: Will benefit from Occupational Therapy 2 times per week  Discharge Recommendations:  Equipment: Will Continue to Assess for Equipment Needs. Post-acute therapy Discharge to home with outpatient or home health for additional skilled therapy services.    See \"Rehab Therapy-Acute\" Patient Summary Report for complete documentation.      84 yr old female admitted for bradycardia/hyperkalemia w/hx of Afib. Pt is s/p PPM placement w/chest tube. Pt is demonstrating decreased activity tolerance from hospitalization, recommend increasing daily activity w/nsg walking 3x/daily and remaining up in chair for meals. Pt reports having DME at home and family close by. Currently recommend HH to maximize independence and safety   "

## 2018-05-10 NOTE — PROGRESS NOTES
[facility] Hospitalist Progress Note    Date of Service: 5/10/2018    Chief Complaint  84 y.o. female admitted 5/5/2018 with bradycardia / hyperkalemia from OSH. Underlying history of A-Fib. Patient of Dr Maddox in outpatient setting. AC with Eliquis and was on Sotalol / Diltiazem prior to presentation. Patient presented to the outlying facility with fatigue, weakness and near syncope x 24 hours. She was found to have bradycardia with hyperkalemia there and transferred to our emergency room for further evaluation and management.     Interval Problem Update  5/7: PPM placed, complicated by L pneumothorax s/p L chest tube  5/8: Increased size of L chest tube, exchanged for larger tube.  4L O2  5/9: Chest tube clamped, HR controlled.  Procalcitonin normalized  5/10: Chest tube removed, ambulatory heart rate monitoring to ensure <110.    Consultants/Specialty  Cardiology   IR    Disposition  Continue telemetry monitoring  DC home after PM interrogation if hR remains well controlled (likely Friday)        Review of Systems   Constitutional: Positive for malaise/fatigue. Negative for chills, diaphoresis and fever.   HENT: Negative for hearing loss and tinnitus.    Eyes: Negative for blurred vision and double vision.   Respiratory: Negative for cough, hemoptysis, sputum production, shortness of breath and wheezing.    Cardiovascular: Positive for leg swelling (chronic). Negative for chest pain, palpitations, orthopnea, claudication and PND.        Chest wall pain improved   Gastrointestinal: Negative for abdominal pain, blood in stool, constipation, diarrhea, heartburn, melena, nausea and vomiting.   Genitourinary: Negative for dysuria, flank pain, frequency, hematuria and urgency.   Musculoskeletal: Positive for back pain (Chronic, takes norco at home). Negative for falls, joint pain, myalgias and neck pain.   Skin: Negative for itching and rash.   Neurological: Positive for weakness. Negative for dizziness, tingling, tremors,  sensory change, speech change, focal weakness, seizures, loss of consciousness and headaches.   Psychiatric/Behavioral: Negative for depression, hallucinations, memory loss, substance abuse and suicidal ideas. The patient has insomnia. The patient is not nervous/anxious.       Physical Exam  Laboratory/Imaging   Hemodynamics  Temp (24hrs), Av.3 °C (97.4 °F), Min:36.2 °C (97.1 °F), Max:36.7 °C (98.1 °F)   Temperature: 36.7 °C (98.1 °F)  Pulse  Av.8  Min: 49  Max: 136    Blood Pressure : 130/72      Respiratory      Respiration: 16, Pulse Oximetry: 96 %  Chest Tube Group 2 (B) Left;Upper [email or web address] Flexima APDL 10F x 25cm  REF [id number]  LOT [id number]  10-Tube Status / Drainage:  Work Of Breathing / Effort: Mild  RUL Breath Sounds: Clear, RML Breath Sounds: Diminished, RLL Breath Sounds: Diminished, MARINA Breath Sounds: Clear, LLL Breath Sounds: Diminished    Fluids    Intake/Output Summary (Last 24 hours) at 05/10/18 1659  Last data filed at 05/10/18 1550   Gross per 24 hour   Intake              360 ml   Output                0 ml   Net              360 ml       Nutrition  Orders Placed This Encounter   Procedures   • DIET ORDER     Standing Status:   Standing     Number of Occurrences:   1     Order Specific Question:   Diet:     Answer:   Cardiac [6]     Order Specific Question:   Diet:     Answer:   Regular [1]     Physical Exam   Constitutional: She is oriented to person, place, and time. She appears well-developed and well-nourished. No distress.   HENT:   Head: Normocephalic.   Mouth/Throat: Oropharynx is clear and moist. No oropharyngeal exudate.   Eyes: Conjunctivae are normal. Pupils are equal, round, and reactive to light. No scleral icterus.   Neck: No JVD present.   Cardiovascular: Normal rate and regular rhythm.  Exam reveals no gallop and no friction rub.    Murmur heard.  Pulmonary/Chest: Breath sounds normal. No stridor. No respiratory distress. She has no wheezes. She has no rales. She  exhibits tenderness.   Left PM in place, L anterior chest tube in place   Abdominal: Soft. Bowel sounds are normal. She exhibits no distension. There is no tenderness. There is no rebound and no guarding.   Musculoskeletal: Normal range of motion. She exhibits edema (Minimal). She exhibits no tenderness or deformity.   Neurological: She is alert and oriented to person, place, and time. No cranial nerve deficit.   Skin: Skin is warm and dry. She is not diaphoretic. There is pallor.   Psychiatric: She has a normal mood and affect. Her behavior is normal. Judgment and thought content normal.       Recent Labs      05/08/18   0056  05/09/18   0106  05/10/18   0604   WBC  9.8  9.0  6.5   RBC  4.16*  4.07*  3.90*   HEMOGLOBIN  12.6  12.2  12.0   HEMATOCRIT  37.8  37.5  35.3*   MCV  90.9  92.1  90.5   MCH  30.3  30.0  30.8   MCHC  33.3*  32.5*  34.0   RDW  46.9  47.5  45.9   PLATELETCT  195  181  180   MPV  8.7*  8.9*  8.9*     Recent Labs      05/08/18   0056  05/09/18   0106  05/10/18   0326   SODIUM  135  131*  130*   POTASSIUM  4.1  4.2  5.0   CHLORIDE  101  98  99   CO2  26  24  22   GLUCOSE  113*  128*  103*   BUN  20  28*  28*   CREATININE  0.86  0.95  0.98   CALCIUM  8.6  8.6  9.2                      Assessment/Plan     Paroxysmal atrial fibrillation (HCC)- (present on admission)   Assessment & Plan    Initially presented to outside facility with symptomatic bradycardia, likely secondary to sotalol and diltiazem combination.   Cardioversion attempted in the ER, unsuccessful  Cardiology team on board  S/P PM placement on 5/7  Continue Diltiazem 60 q6h and metoprolol 100 BID at this time per cardiology team  Continue AC with Eliquis  Continue telemetry monitoring  Further recommendations per cardiology team.  Will ambulate to ensure rate stays less than 110.  Interrogate pacemaker in a.m.        Cystitis- (present on admission)   Assessment & Plan    Uncomplicated  Has completed 3d ceftriaxone  Repeat procalcitonin  in AM        Leukocytosis- (present on admission)   Assessment & Plan    Resolved w treatment of UTI        Iatrogenic pneumothorax, left   Assessment & Plan    Developed after pacemaker was placed on 5/7  Repeat CXR on 5/8 showed increased size of ptx  Upsized chest tube done on 5/8 and CXR on 5/9 shows resolution of PTX, CT, clamped and repeat imaging with slight residual pneumo.  5/10: CXR unchanged, chest tube to be removed  Repeat CXR in AM  Doing well on RA        GERD (gastroesophageal reflux disease)- (present on admission)   Assessment & Plan    Stopped PPI while empiric abx used during hospital stay to limit risk of C Diff  Lactobacillus  Resume PPI        Sepsis (HCC)- (present on admission)   Assessment & Plan    Resolved   Bradycardia / tachycardia 2/2 cardiac conditions  Leukocytosis may be stress related from underlying cardiac issues, however elevated procalcitonin may suggest that uti was causing symptoms.   Completed treatment with empiric ceftriaxone x 3 days for uncomplicated cystitis  Will continue close clinical monitoring  Procalcitonin has normalized        Hyperkalemia- (present on admission)   Assessment & Plan    That was treated at outside facility with calcium gluconate, insulin, dextrose, bicarb and it was resolved  Will monitor patient on telemetry  Daily K monitoring  Holding aldactone, BP is low normal          Quality-Core Measures   Reviewed items::  Labs reviewed, Medications reviewed and Radiology images reviewed  Robles catheter::  No Robles  DVT: Eliquis.  Ulcer Prophylaxis::  Not indicated  Assessed for rehabilitation services:  Patient was assess for and/or received rehabilitation services during this hospitalization

## 2018-05-10 NOTE — PROGRESS NOTES
Cardiology Progress Note               Author: Alysha Mccloud Date & Time created: 5/10/2018  11:16 AM     Interval History:  Patient seen on EPS rounds.  84-year-old woman from [facility] who has A. fib on sotalol and with diltiazem had junctional bradycardia then significant tachycardia on sotalol and metoprolol failed amiodarone in the past and failed cardioversion ×2 in the emergency room. History of paroxysmal A. Fib on sotalol, on Eliquis, hypertension, back pain, arthritis     5/7/18 per Dr Jones:  IMPLANTED DEVICE INFORMATION:  Pulse generator is a SJM model [id number]  Serial # [id number]     LEAD INFORMATION:  1)Right atrial lead is a SJM model #[id number]/46 , serial #[id number], P wave 0.4 millivolts (AF), pacing impedance 540 Ohms.     2)Right ventricular lead is a SJM model #[id number]/52, serial #[id number], R wave 12.0 millivolts, threshold 0.5 Volts at 0.5 milliseconds, pacing impedance 790 Ohms.     DEVICE PROGRAMMING:  DDD 60 -120 ppm     FLUOROSCOPY TIME: 2.7 minutes     IMPRESSIONS:  1. Successful dual chamber pacemaker implantation     COMPLICATION(S): Ptx.     Patient underwent IR insertion of larger chest tube 5/8/18  CXR Ptx appears tiny PTx    Chief Complaint:  AF with RVR    Review of Systems   Constitutional: Negative for chills and fever.   HENT: Negative for sore throat.         No difficulty swallowing   Eyes: Negative for blurred vision and double vision.   Respiratory: Negative for cough, shortness of breath and wheezing.    Cardiovascular: Negative for chest pain, palpitations, orthopnea, claudication, leg swelling and PND.   Gastrointestinal: Negative for abdominal pain, blood in stool, heartburn, nausea and vomiting.   Genitourinary: Negative for dysuria, frequency, hematuria and urgency.   Musculoskeletal: Positive for back pain. Negative for myalgias.   Skin: Negative.    Neurological: Negative for dizziness, speech change, focal weakness, loss of consciousness and headaches.    Endo/Heme/Allergies: Does not bruise/bleed easily.   Psychiatric/Behavioral: Negative.        Physical Exam   Constitutional: She is oriented to person, place, and time. She appears well-developed and well-nourished.   HENT:   Head: Normocephalic and atraumatic.   Eyes: Pupils are equal, round, and reactive to light.   Neck: Normal range of motion. Neck supple. No thyromegaly present.   Cardiovascular: Normal rate, regular rhythm, normal heart sounds and intact distal pulses.  Exam reveals no gallop and no friction rub.    No murmur heard.  Pulmonary/Chest: Effort normal and breath sounds normal. No respiratory distress. She has no wheezes. She has no rales. She exhibits no tenderness.   Abdominal: Soft. Bowel sounds are normal. She exhibits no distension. There is no tenderness. There is no guarding.   Musculoskeletal: Normal range of motion. She exhibits no edema.   Neurological: She is alert and oriented to person, place, and time.   Skin: Skin is warm and dry.   Psychiatric: She has a normal mood and affect.       Hemodynamics:  Temp (24hrs), Av.3 °C (97.3 °F), Min:36.2 °C (97.1 °F), Max:36.6 °C (97.8 °F)  Temperature: 36.3 °C (97.3 °F)  Pulse  Av.4  Min: 49  Max: 136   Blood Pressure : 113/72     Respiratory:    Respiration: 16, Pulse Oximetry: 96 %  Chest Tube Group 2 (B) Left;Upper [employer] Flexima APDL 10F x 25cm  REF [id number]  LOT [id number]  10-Tube Status / Drainage:  (clamped), Chest Tube Group 2 (B) Left;Upper [employer] Flexima APDL 10F x 25cm  REF [id number]  LOT [id number]  10-Device:  (clamped per radiology)     RLL Breath Sounds: Diminished, LLL Breath Sounds: Diminished  Fluids:  Date 05/10/18 0700 - 18 0659   Shift 7769-0108 3164-6495 2250-7054 24 Hour Total   I  N  T  A  K  E   Shift Total       O  U  T  P  U  T   Urine 0   0    Drains 0   0    Stool/Urine 0   0    Shift Total 0   0   Weight (kg) 64.6 64.6 64.6 64.6       Weight: 64.6 kg (142 lb 6.7  oz)  GI/Nutrition:  Orders Placed This Encounter   Procedures   • DIET ORDER     Standing Status:   Standing     Number of Occurrences:   1     Order Specific Question:   Diet:     Answer:   Cardiac [6]     Order Specific Question:   Diet:     Answer:   Regular [1]     Lab Results:  Recent Labs      05/08/18   0056  05/09/18   0106  05/10/18   0604   WBC  9.8  9.0  6.5   RBC  4.16*  4.07*  3.90*   HEMOGLOBIN  12.6  12.2  12.0   HEMATOCRIT  37.8  37.5  35.3*   MCV  90.9  92.1  90.5   MCH  30.3  30.0  30.8   MCHC  33.3*  32.5*  34.0   RDW  46.9  47.5  45.9   PLATELETCT  195  181  180   MPV  8.7*  8.9*  8.9*     Recent Labs      05/08/18   0056  05/09/18   0106  05/10/18   0326   SODIUM  135  131*  130*   POTASSIUM  4.1  4.2  5.0   CHLORIDE  101  98  99   CO2  26  24  22   GLUCOSE  113*  128*  103*   BUN  20  28*  28*   CREATININE  0.86  0.95  0.98   CALCIUM  8.6  8.6  9.2                         Medical Decision Making, by Problem:  Active Hospital Problems    Diagnosis   • Paroxysmal atrial fibrillation (HCC) [I48.0]   • Leukocytosis [D72.829]   • Cystitis [N30.90]   • GERD (gastroesophageal reflux disease) [K21.9]   • Hyperkalemia [E87.5]   • Sepsis (HCC) [A41.9]   • Iatrogenic pneumothorax, left [J95.811]       Plan:  IR will determine CT and discontinuation of.  AF with VRR up to 110+ bpm range when up.   Hopefully, with removal of CT today she can walk and will determine if AVN ablation needed to control VRR.      Quality-Core Measures   Reviewed items::  EKG reviewed, Medications reviewed, Labs reviewed and Radiology images reviewed

## 2018-05-10 NOTE — PROGRESS NOTES
Called [phone] to inquire about chest tube plans for the night with no answer. Report given to oncoming shift.

## 2018-05-10 NOTE — THERAPY
"Physical Therapy Evaluation completed.   Bed Mobility:  Supine to Sit:  (up in chair )  Transfers: Sit to Stand: Stand by Assist  Gait: Level Of Assist: Stand by Assist with Front-Wheel Walker       Plan of Care: Will benefit from Physical Therapy 2 times per week  Discharge Recommendations: Equipment: No Equipment Needed. See below    Pt presents to PT with impaired endurance and balance associated with recent deconditnioning and medical co-morbidities. She was able to demonstrate hallway ambulation with her FWW with SBA with no noted LOB. She reports her current gait mechanics are baseline. She maintains appropriate HR throughout (now paced) and is only slighlty limited 2' to decreased endurance and general fatigue. Anticipate that pt will be functionally capable of d/c to home once medically cleared and anticipate with increased OOB activity with both PT and staff pt will progress well with mobility. Would recommend continued skilled PT after medical d/c to home for optimal functional recovery and formal home assessment. Pt reports no concerns with dc plan to home and has appropriate AD and family close by as needed.  WIll continue to visit while here.     See \"Rehab Therapy-Acute\" Patient Summary Report for complete documentation.     "

## 2018-05-10 NOTE — PROGRESS NOTES
Bedside report received; assumed care. Pt is resting in bed. She denies any SOB or troubles with her breathing. Updated on plan of care with no questions or concerns at this time. Pain is rated 6/10; will treat with meds/rest/reposition. Bed is locked and in the lowest position/call light is in reach.

## 2018-05-10 NOTE — PROGRESS NOTES
S/p iatrogenic left pneumothorax after pacemaker placement and IR placement of 8 Fr chest tube May 7 with upsize to 10 Fr on May 8 by Marcos Lackey MD.     Serial CXRs reviewed with Victoriano Quintero MD (IR, x [phone]). Chest tube clamped yesterday with small PNTX noted on f/u XR 5/9. Repeat XR this am shows no change. Pt denies chest pain or dyspnea. Pt on RA. No air leak on exam. Chest tube removed without difficulty, occlusive dressing placed. Site care instructions reviewed. Pt understands to call bedside RN for development of chest pain or dyspnea s/p chest tube removal. From IR standpoint, pt may resume all activity. After discussion with Alysha LEONE. She has ordered CXR for am.

## 2018-05-10 NOTE — CARE PLAN
Problem: Discharge Barriers/Planning  Goal: Patient's continuum of care needs will be met  Awaiting IR to take out chest tube, multiple calls to them with no resolution at this time. Pt will be reassessed for the need of any further treatments. Monitoring pt on tele.     Problem: Pain Management  Goal: Pain level will decrease to patient's comfort goal  Will medicate for pain PRN see MAR

## 2018-05-10 NOTE — PROGRESS NOTES
Assumed care of patient, received report from day RN. Communication board updated and reviewed POC with pt. Pt denies pain at this time. Pt has refused the bed alarm despite education, pt is A&O x4 and has demonstrated calling appropriately.  Assessment complete. No other needs at this time. Call light and personal belongings within reach.

## 2018-05-10 NOTE — PROGRESS NOTES
[facility] Hospitalist Progress Note    Date of Service: 5/9/2018    Chief Complaint  84 y.o. female admitted 5/5/2018 with bradycardia / hyperkalemia from OSH. Underlying history of A-Fib. Patient of Dr Maddox in outpatient setting. AC with Eliquis and was on Sotalol / Diltiazem prior to presentation. Patient presented to the outlying facility with fatigue, weakness and near syncope x 24 hours. She was found to have bradycardia with hyperkalemia there and transferred to our emergency room for further evaluation and management.     Interval Problem Update  5/7: PPM placed, complicated by L pneumothorax s/p L chest tube  5/8: Increased size of L chest tube, exchanged for larger tube.  4L O2  5/9: Chest tube clamped, HR controlled.  Procalcitonin normalized    Consultants/Specialty  Cardiology   IR    Disposition  Continue telemetry monitoring  Obtain PT/OT evaluation for disposition needs  DC home after L pneumo resolved and CT removed (likely Friday)        Review of Systems   Constitutional: Positive for malaise/fatigue. Negative for chills, diaphoresis and fever.   HENT: Negative for hearing loss and tinnitus.    Eyes: Negative for blurred vision and double vision.   Respiratory: Negative for cough, hemoptysis, sputum production, shortness of breath and wheezing.    Cardiovascular: Positive for leg swelling (chronic). Negative for chest pain, palpitations, orthopnea, claudication and PND.        Chest wall pain improved   Gastrointestinal: Negative for abdominal pain, blood in stool, constipation, diarrhea, heartburn, melena, nausea and vomiting.   Genitourinary: Negative for dysuria, flank pain, frequency, hematuria and urgency.   Musculoskeletal: Positive for back pain (Chronic, takes norco at home). Negative for falls, joint pain, myalgias and neck pain.   Skin: Negative for itching and rash.   Neurological: Positive for weakness. Negative for dizziness, tingling, tremors, sensory change, speech change, focal  weakness, seizures, loss of consciousness and headaches.   Psychiatric/Behavioral: Negative for depression, hallucinations, memory loss, substance abuse and suicidal ideas. The patient has insomnia. The patient is not nervous/anxious.       Physical Exam  Laboratory/Imaging   Hemodynamics  Temp (24hrs), Av.4 °C (97.5 °F), Min:36.2 °C (97.2 °F), Max:36.6 °C (97.8 °F)   Temperature: 36.6 °C (97.8 °F)  Pulse  Av.8  Min: 49  Max: 136    Blood Pressure : 109/54      Respiratory      Respiration: 18, Pulse Oximetry: 95 %  Chest Tube Group 2 (B) Left;Upper [employer] Flexima APDL 10F x 25cm  REF [id number]  LOT [id number]  10-Tube Status / Drainage:     RUL Breath Sounds: Diminished, RML Breath Sounds: Diminished, RLL Breath Sounds: Diminished, MARINA Breath Sounds: Diminished, LLL Breath Sounds: Diminished    Fluids    Intake/Output Summary (Last 24 hours) at 18 1802  Last data filed at 18 1600   Gross per 24 hour   Intake              720 ml   Output                0 ml   Net              720 ml       Nutrition  Orders Placed This Encounter   Procedures   • DIET ORDER     Standing Status:   Standing     Number of Occurrences:   1     Order Specific Question:   Diet:     Answer:   Cardiac [6]     Order Specific Question:   Diet:     Answer:   Regular [1]     Physical Exam   Constitutional: She is oriented to person, place, and time. She appears well-developed and well-nourished. No distress.   HENT:   Head: Normocephalic.   Mouth/Throat: Oropharynx is clear and moist. No oropharyngeal exudate.   Eyes: Conjunctivae are normal. Pupils are equal, round, and reactive to light. No scleral icterus.   Neck: No JVD present.   Cardiovascular: Normal rate and regular rhythm.  Exam reveals no gallop and no friction rub.    Murmur heard.  Pulmonary/Chest: Breath sounds normal. No stridor. No respiratory distress. She has no wheezes. She has no rales. She exhibits tenderness.   Left PM in place, L anterior chest  tube in place   Abdominal: Soft. Bowel sounds are normal. She exhibits no distension. There is no tenderness. There is no rebound and no guarding.   Musculoskeletal: Normal range of motion. She exhibits edema (Minimal). She exhibits no tenderness or deformity.   Neurological: She is alert and oriented to person, place, and time. No cranial nerve deficit.   Skin: Skin is warm and dry. She is not diaphoretic. There is pallor.   Ecchymosis of different site from bruising skin   Psychiatric: She has a normal mood and affect. Her behavior is normal. Judgment and thought content normal.       Recent Labs      05/07/18 0227 05/08/18   0056  05/09/18   0106   WBC  9.3  9.8  9.0   RBC  4.52  4.16*  4.07*   HEMOGLOBIN  13.6  12.6  12.2   HEMATOCRIT  42.3  37.8  37.5   MCV  93.6  90.9  92.1   MCH  30.1  30.3  30.0   MCHC  32.2*  33.3*  32.5*   RDW  48.3  46.9  47.5   PLATELETCT  189  195  181   MPV  8.8*  8.7*  8.9*     Recent Labs      05/07/18 0227 05/08/18   0056  05/09/18   0106   SODIUM  133*  135  131*   POTASSIUM  4.0  4.1  4.2   CHLORIDE  99  101  98   CO2  25  26  24   GLUCOSE  106*  113*  128*   BUN  22  20  28*   CREATININE  0.90  0.86  0.95   CALCIUM  9.0  8.6  8.6                      Assessment/Plan     Paroxysmal atrial fibrillation (HCC)- (present on admission)   Assessment & Plan    Initially presented to outside facility with symptomatic bradycardia, likely secondary to sotalol and diltiazem combination.   Cardioversion attempted in the ER, unsuccessful  Cardiology team on board  S/P PM placement on 5/7  Continue Diltiazem and metoprolol at this time per cardiology team  Continue AC with Eliquis  Continue telemetry monitoring  Further recommendations per cardiology team.        Cystitis- (present on admission)   Assessment & Plan    Uncomplicated  Has completed 3d ceftriaxone  Repeat procalcitonin in AM        Leukocytosis- (present on admission)   Assessment & Plan    Leukocytosis may be stress related  from underlying cardiac issues, has normalized, pro calcitonin negative  Will treat with empiric ceftriaxone x 3 days for uncomplicated cystitis  Will continue close clinical monitoring        Iatrogenic pneumothorax, left   Assessment & Plan    Developed after pacemaker was placed on 5/7  Repeat CXR on 5/8 showed increased size of ptx  Upsized chest tube done on 5/8 and CXR on 5/9 shows resolution of PTX  CT, clamped  IR is managing tube and will remove today vs tomorrow        GERD (gastroesophageal reflux disease)- (present on admission)   Assessment & Plan    Stop PPI while empiric abx used during hospital stay to limit risk of C Diff  Lactobacillus  Resume PPI        Sepsis (HCC)- (present on admission)   Assessment & Plan    Bradycardia / tachycardia 2/2 cardiac conditions  Leukocytosis may be stress related from underlying cardiac issues, however elevated procalcitonin may suggest that uti was causing symptoms.   Completed treatment with empiric ceftriaxone x 3 days for uncomplicated cystitis  Will continue close clinical monitoring  Procalcitonin has normalized        Hyperkalemia- (present on admission)   Assessment & Plan    That was treated at outside facility with calcium gluconate, insulin, dextrose, bicarb and it was resolved  Will monitor patient on telemetry  Daily K monitoring  Holding aldactone, BP is low normal          Quality-Core Measures   Reviewed items::  Labs reviewed, Medications reviewed and Radiology images reviewed  Robles catheter::  No Robles  DVT: Eliquis.  Ulcer Prophylaxis::  Not indicated  Assessed for rehabilitation services:  Patient was assess for and/or received rehabilitation services during this hospitalization

## 2018-05-11 ENCOUNTER — APPOINTMENT (OUTPATIENT)
Dept: RADIOLOGY | Facility: MEDICAL CENTER | Age: 83
DRG: 243 | End: 2018-05-11
Attending: NURSE PRACTITIONER
Payer: MEDICARE

## 2018-05-11 VITALS
TEMPERATURE: 97.8 F | RESPIRATION RATE: 14 BRPM | DIASTOLIC BLOOD PRESSURE: 67 MMHG | HEART RATE: 72 BPM | BODY MASS INDEX: 24.28 KG/M2 | HEIGHT: 64 IN | OXYGEN SATURATION: 93 % | SYSTOLIC BLOOD PRESSURE: 124 MMHG | WEIGHT: 142.2 LBS

## 2018-05-11 PROBLEM — Z95.0 S/P PLACEMENT OF CARDIAC PACEMAKER: Status: ACTIVE | Noted: 2018-05-11

## 2018-05-11 LAB
BACTERIA BLD CULT: NORMAL
BACTERIA BLD CULT: NORMAL
SIGNIFICANT IND 70042: NORMAL
SIGNIFICANT IND 70042: NORMAL
SITE SITE: NORMAL
SITE SITE: NORMAL
SOURCE SOURCE: NORMAL
SOURCE SOURCE: NORMAL

## 2018-05-11 PROCEDURE — 700102 HCHG RX REV CODE 250 W/ 637 OVERRIDE(OP): Performed by: INTERNAL MEDICINE

## 2018-05-11 PROCEDURE — 99239 HOSP IP/OBS DSCHRG MGMT >30: CPT | Performed by: INTERNAL MEDICINE

## 2018-05-11 PROCEDURE — A9270 NON-COVERED ITEM OR SERVICE: HCPCS | Performed by: INTERNAL MEDICINE

## 2018-05-11 PROCEDURE — A9270 NON-COVERED ITEM OR SERVICE: HCPCS | Performed by: NURSE PRACTITIONER

## 2018-05-11 PROCEDURE — 700102 HCHG RX REV CODE 250 W/ 637 OVERRIDE(OP): Performed by: NURSE PRACTITIONER

## 2018-05-11 PROCEDURE — 71045 X-RAY EXAM CHEST 1 VIEW: CPT

## 2018-05-11 RX ORDER — METOPROLOL TARTRATE 100 MG/1
100 TABLET ORAL 2 TIMES DAILY
Qty: 60 TAB | Refills: 2 | Status: SHIPPED | OUTPATIENT
Start: 2018-05-11 | End: 2018-06-19 | Stop reason: SDUPTHER

## 2018-05-11 RX ORDER — DILTIAZEM HYDROCHLORIDE 240 MG/1
240 CAPSULE, COATED, EXTENDED RELEASE ORAL DAILY
Qty: 30 CAP | Refills: 2 | Status: SHIPPED | OUTPATIENT
Start: 2018-05-11 | End: 2018-06-19

## 2018-05-11 RX ORDER — METOPROLOL TARTRATE 100 MG/1
100 TABLET ORAL 2 TIMES DAILY
Qty: 60 TAB | Refills: 2 | Status: SHIPPED | OUTPATIENT
Start: 2018-05-11 | End: 2018-05-11

## 2018-05-11 RX ORDER — APIXABAN 2.5 MG/1
5 TABLET, FILM COATED ORAL 2 TIMES DAILY
Qty: 60 TAB | Refills: 2 | Status: SHIPPED | OUTPATIENT
Start: 2018-05-11 | End: 2018-09-19 | Stop reason: SDUPTHER

## 2018-05-11 RX ADMIN — DILTIAZEM HYDROCHLORIDE 60 MG: 30 TABLET, FILM COATED ORAL at 00:14

## 2018-05-11 RX ADMIN — MELOXICAM 7.5 MG: 7.5 TABLET ORAL at 05:55

## 2018-05-11 RX ADMIN — OMEPRAZOLE 20 MG: 20 CAPSULE, DELAYED RELEASE ORAL at 05:55

## 2018-05-11 RX ADMIN — DILTIAZEM HYDROCHLORIDE 60 MG: 30 TABLET, FILM COATED ORAL at 12:00

## 2018-05-11 RX ADMIN — DILTIAZEM HYDROCHLORIDE 60 MG: 30 TABLET, FILM COATED ORAL at 06:06

## 2018-05-11 RX ADMIN — LACTOBACILLUS ACIDOPHILUS / LACTOBACILLUS BULGARICUS 1 PACKET: 100 MILLION CFU STRENGTH GRANULES at 12:07

## 2018-05-11 RX ADMIN — LACTOBACILLUS ACIDOPHILUS / LACTOBACILLUS BULGARICUS 1 PACKET: 100 MILLION CFU STRENGTH GRANULES at 05:57

## 2018-05-11 RX ADMIN — HYDROCODONE BITARTRATE AND ACETAMINOPHEN 1 TABLET: 7.5; 325 TABLET ORAL at 10:45

## 2018-05-11 RX ADMIN — IPRATROPIUM BROMIDE 1 SPRAY: 42 SPRAY NASAL at 05:57

## 2018-05-11 RX ADMIN — METOPROLOL TARTRATE 100 MG: 50 TABLET, FILM COATED ORAL at 05:56

## 2018-05-11 RX ADMIN — HYDROCODONE BITARTRATE AND ACETAMINOPHEN 1 TABLET: 7.5; 325 TABLET ORAL at 06:06

## 2018-05-11 RX ADMIN — APIXABAN 5 MG: 5 TABLET, FILM COATED ORAL at 05:55

## 2018-05-11 ASSESSMENT — PAIN SCALES - GENERAL
PAINLEVEL_OUTOF10: 7
PAINLEVEL_OUTOF10: 7
PAINLEVEL_OUTOF10: 5

## 2018-05-11 ASSESSMENT — ENCOUNTER SYMPTOMS
SHORTNESS OF BREATH: 0
BLURRED VISION: 0
NAUSEA: 0
FOCAL WEAKNESS: 0
ORTHOPNEA: 0
COUGH: 0
HEARTBURN: 0
LOSS OF CONSCIOUSNESS: 0
WHEEZING: 0
CHILLS: 0
DOUBLE VISION: 0
ABDOMINAL PAIN: 0
HEADACHES: 0
CLAUDICATION: 0
PND: 0
SORE THROAT: 0
SPEECH CHANGE: 0
PALPITATIONS: 0
MYALGIAS: 0
FEVER: 0
DIZZINESS: 0
BLOOD IN STOOL: 0
PSYCHIATRIC NEGATIVE: 1
VOMITING: 0
BRUISES/BLEEDS EASILY: 0

## 2018-05-11 NOTE — PROGRESS NOTES
Bedside report received; assumed care. Pt is resting in bed and denies pain. Plan for the day is to discharge home. Bed is locked in the lowest position, call light within reach.

## 2018-05-11 NOTE — PROGRESS NOTES
Monitor Summary:    A-fib/Paced   Alternating bundle branch beats  Frequent aberrancy beats  Rare pvc, rare couplets

## 2018-05-11 NOTE — DISCHARGE INSTRUCTIONS
Discharge Instructions    Discharged to home by car with relative. Discharged via wheelchair, hospital escort: Refused.  Special equipment needed: Not Applicable    Be sure to schedule a follow-up appointment with your primary care doctor or any specialists as instructed.     Discharge Plan:   Diet Plan: Discussed  Activity Level: Discussed  Confirmed Follow up Appointment: Appointment Scheduled  Confirmed Symptoms Management: Discussed  Medication Reconciliation Updated: Yes  Influenza Vaccine Indication: Not indicated: Previously immunized this influenza season and > 8 years of age    I understand that a diet low in cholesterol, fat, and sodium is recommended for good health. Unless I have been given specific instructions below for another diet, I accept this instruction as my diet prescription.   Other diet: Regular/heart healthy    Special Instructions:n/a    · Is patient discharged on Warfarin / Coumadin?   No     Depression / Suicide Risk    As you are discharged from this [facility] facility, it is important to learn how to keep safe from harming yourself.    Recognize the warning signs:  · Abrupt changes in personality, positive or negative- including increase in energy   · Giving away possessions  · Change in eating patterns- significant weight changes-  positive or negative  · Change in sleeping patterns- unable to sleep or sleeping all the time   · Unwillingness or inability to communicate  · Depression  · Unusual sadness, discouragement and loneliness  · Talk of wanting to die  · Neglect of personal appearance   · Rebelliousness- reckless behavior  · Withdrawal from people/activities they love  · Confusion- inability to concentrate     If you or a loved one observes any of these behaviors or has concerns about self-harm, here's what you can do:  · Talk about it- your feelings and reasons for harming yourself  · Remove any means that you might use to hurt yourself (examples: pills, rope, extension  cords, firearm)  · Get professional help from the community (Mental Health, Substance Abuse, psychological counseling)  · Do not be alone:Call your Safe Contact- someone whom you trust who will be there for you.  · Call your local CRISIS HOTLINE 609-5308 or 105-414-4080  · Call your local Children's Mobile Crisis Response Team [place] (219) 729-5492 or www.BIO-NEMS  · Call the toll free National Suicide Prevention Hotlines   · National Suicide Prevention Lifeline 989-065-NJSN (0896)  · the [facility] Line Network 800-SUICIDE (304-8900)      Pacemaker Implantation, Care After  Refer to this sheet over the next few weeks. These instructions provide you with information on caring for yourself after the procedure. Your health care provider may also give you more specific instructions. Your treatment has been planned according to current medical practices, but problems sometimes occur. Call your health care provider if you have any problems or questions regarding your pacemaker.   WHAT TO EXPECT AFTER THE PROCEDURE  · You may feel pain. Some pain is normal. It may last a few days.  · A slight bump may be seen over the skin where the device was placed. Sometimes, it is possible to feel the device under the skin. This is normal.  · In the months and years afterward, your health care provider will check the device, the leads, and the battery every few months. Eventually, when the battery is low, the device will be replaced.  HOME CARE INSTRUCTIONS  Medicines  · Take medicines only as directed by your health care provider.  · If you were prescribed an antibiotic medicine, finish it all even if you start to feel better.  · Do not take any other medicines without asking your health care provider first. Some medicines, including certain painkillers, can cause bleeding in your stomach after surgery.  Wound Care  · Do not remove the bandage on your chest until directed to do so by your health care provider.  · After  your bandage is removed, you may see pieces of tape called skin adhesive strips over the area where the cut was made (incision site). Let them fall off on their own.  · Check the incision site every day to make sure it is not infected, bleeding, or starting to pull apart.  · Do not use lotions or ointments near the incision site unless directed to do so.  · Keep the incision area clean and dry for 2-3 days after the procedure or as directed by your health care provider. It takes several weeks for the incision site to completely heal.  · Do not take baths, swim, or use a hot tub until your health care provider approves.  Activities  · Try to walk a little every day. Exercising is important after this procedure. It is also important to use your shoulder on the side of the pacemaker in daily tasks that do not require exaggerated motion.  · Avoid sudden jerking, pulling, or chopping movements that pull your upper arm far away from your body for at least 6 weeks.  · Do not lift your upper arm above your shoulders for at least 6 weeks. This means no tennis, golf, or swimming for this period of time. If you sleep with the arm above your head, use a restraint to prevent this from happening as you sleep.  · You may go back to work when your health care provider says it is okay. Check with your health care provider before you start to drive or play sports.  Other Instructions  · Follow diet instructions if they were provided. You should be able to eat what you usually do right away, but you may need to limit your salt intake.  · Weigh yourself every day. If you suddenly gain weight, fluid may be building up in your body.  · Always carry your pacemaker identification card with you. The card should list the implant date, device model, and . Consider wearing a medical alert bracelet or necklace.  · Tell all health care providers that you have a pacemaker. This may prevent them from giving you a magnetic resource  imaging scan (MRI) because of the strong magnets used during that test.  · If you must pass through a metal detector, quickly walk through it. Do not stop under the detector or stand near it.  · Avoid places or objects with a strong electric or magnetic field, including:  ¨ Airport security neri. When at the airport, let officials know you have a pacemaker. Your ID card will let you be checked in a way that is safe for you and that will not damage your pacemaker. Also, do not let a security person wave a magnetic wand near your pacemaker. That can make it stop working.  ¨ Power plants.  ¨ Large electrical generators.  ¨ Radiofrequency transmission towers, such as cell phone and radio towers.  · Do not use amateur (ham) radio equipment or electric (arc) welding torches. Some devices are safe to use if held at least 1 foot from your pacemaker. These include power tools, lawn mowers, and speakers. If you are unsure of whether something is safe to use, ask your health care provider.  · You may safely use electric blankets, heating pads, computers, and microwave ovens.  · When using your cell phone, hold it to the ear opposite the pacemaker. Do not leave your cell phone in a pocket over the pacemaker.  · Keep all follow-up visits as directed by your health care provider. This is how your health care provider makes sure your chest is healing the way it should. Ask your health care provider when you should come back to have your stitches or staples taken out.  · Have your pacemaker checked every 3-6 months or as directed by your health care provider. Most pacemakers last for 4-8 years before a new one is needed.  SEEK MEDICAL CARE IF:  · You gain weight suddenly.  · Your legs or feet swell more than they have before.  · It feels like your heart is fluttering or skipping beats (heart palpitations).  · You have a fever.  SEEK IMMEDIATE MEDICAL CARE IF:  · You have chest pain.  · You feel more short of breath than you have  felt before.  · You feel more light-headed than you have felt before.  · You have problems with your incision site, such as swelling or bleeding, or it starts to open up.  · You have drainage, redness, swelling, or pain at your incision site.     This information is not intended to replace advice given to you by your health care provider. Make sure you discuss any questions you have with your health care provider.     Document Released: 07/07/2006 Document Revised: 01/08/2016 Document Reviewed: 04/19/2013  [employer] Interactive Patient Education ©2016 [employer]    Atrial Fibrillation  Atrial fibrillation is a type of irregular or rapid heartbeat (arrhythmia). In atrial fibrillation, the heart quivers continuously in a chaotic pattern. This occurs when parts of the heart receive disorganized signals that make the heart unable to pump blood normally. This can increase the risk for stroke, heart failure, and other heart-related conditions. There are different types of atrial fibrillation, including:  · Paroxysmal atrial fibrillation. This type starts suddenly, and it usually stops on its own shortly after it starts.  · Persistent atrial fibrillation. This type often lasts longer than a week. It may stop on its own or with treatment.  · Long-lasting persistent atrial fibrillation. This type lasts longer than 12 months.  · Permanent atrial fibrillation. This type does not go away.  Talk with your health care provider to learn about the type of atrial fibrillation that you have.  What are the causes?  This condition is caused by some heart-related conditions or procedures, including:  · A heart attack.  · Coronary artery disease.  · Heart failure.  · Heart valve conditions.  · High blood pressure.  · Inflammation of the sac that surrounds the heart (pericarditis).  · Heart surgery.  · Certain heart rhythm disorders, such as Johnson-Parkinson-White syndrome.  Other causes include:  · Pneumonia.  · Obstructive sleep  apnea.  · Blockage of an artery in the lungs (pulmonary embolism, or PE).  · Lung cancer.  · Chronic lung disease.  · Thyroid problems, especially if the thyroid is overactive (hyperthyroidism).  · Caffeine.  · Excessive alcohol use or illegal drug use.  · Use of some medicines, including certain decongestants and diet pills.  Sometimes, the cause cannot be found.  What increases the risk?  This condition is more likely to develop in:  · People who are older in age.  · People who smoke.  · People who have diabetes mellitus.  · People who are overweight (obese).  · Athletes who exercise vigorously.  What are the signs or symptoms?  Symptoms of this condition include:  · A feeling that your heart is beating rapidly or irregularly.  · A feeling of discomfort or pain in your chest.  · Shortness of breath.  · Sudden light-headedness or weakness.  · Getting tired easily during exercise.  In some cases, there are no symptoms.  How is this diagnosed?  Your health care provider may be able to detect atrial fibrillation when taking your pulse. If detected, this condition may be diagnosed with:  · An electrocardiogram (ECG).  · A Holter monitor test that records your heartbeat patterns over a 24-hour period.  · Transthoracic echocardiogram (TTE) to evaluate how blood flows through your heart.  · Transesophageal echocardiogram (CELINE) to view more detailed images of your heart.  · A stress test.  · Imaging tests, such as a CT scan or chest X-ray.  · Blood tests.  How is this treated?  The main goals of treatment are to prevent blood clots from forming and to keep your heart beating at a normal rate and rhythm. The type of treatment that you receive depends on many factors, such as your underlying medical conditions and how you feel when you are experiencing atrial fibrillation.  This condition may be treated with:  · Medicine to slow down the heart rate, bring the heart’s rhythm back to normal, or prevent clots from  forming.  · Electrical cardioversion. This is a procedure that resets your heart’s rhythm by delivering a controlled, low-energy shock to the heart through your skin.  · Different types of ablation, such as catheter ablation, catheter ablation with pacemaker, or surgical ablation. These procedures destroy the heart tissues that send abnormal signals. When the pacemaker is used, it is placed under your skin to help your heart beat in a regular rhythm.  Follow these instructions at home:  · Take over-the counter and prescription medicines only as told by your health care provider.  · If your health care provider prescribed a blood-thinning medicine (anticoagulant), take it exactly as told. Taking too much blood-thinning medicine can cause bleeding. If you do not take enough blood-thinning medicine, you will not have the protection that you need against stroke and other problems.  · Do not use tobacco products, including cigarettes, chewing tobacco, and e-cigarettes. If you need help quitting, ask your health care provider.  · If you have obstructive sleep apnea, manage your condition as told by your health care provider.  · Do not drink alcohol.  · Do not drink beverages that contain caffeine, such as coffee, soda, and tea.  · Maintain a healthy weight. Do not use diet pills unless your health care provider approves. Diet pills may make heart problems worse.  · Follow diet instructions as told by your health care provider.  · Exercise regularly as told by your health care provider.  · Keep all follow-up visits as told by your health care provider. This is important.  How is this prevented?  · Avoid drinking beverages that contain caffeine or alcohol.  · Avoid certain medicines, especially medicines that are used for breathing problems.  · Avoid certain herbs and herbal medicines, such as those that contain ephedra or ginseng.  · Do not use illegal drugs, such as cocaine and amphetamines.  · Do not smoke.  · Manage  your high blood pressure.  Contact a health care provider if:  · You notice a change in the rate, rhythm, or strength of your heartbeat.  · You are taking an anticoagulant and you notice increased bruising.  · You tire more easily when you exercise or exert yourself.  Get help right away if:  · You have chest pain, abdominal pain, sweating, or weakness.  · You feel nauseous.  · You notice blood in your vomit, bowel movement, or urine.  · You have shortness of breath.  · You suddenly have swollen feet and ankles.  · You feel dizzy.  · You have sudden weakness or numbness of the face, arm, or leg, especially on one side of the body.  · You have trouble speaking, trouble understanding, or both (aphasia).  · Your face or your eyelid droops on one side.  These symptoms may represent a serious problem that is an emergency. Do not wait to see if the symptoms will go away. Get medical help right away. Call your local emergency services (911 in the [place] Do not drive yourself to the hospital.   This information is not intended to replace advice given to you by your health care provider. Make sure you discuss any questions you have with your health care provider.  Document Released: 12/18/2006 Document Revised: 04/26/2017 Document Reviewed: 04/13/2016  [employer] Interactive Patient Education © 2017 [employer]

## 2018-05-11 NOTE — PROGRESS NOTES
Cardiology Progress Note               Author: Alysha Mccloud Date & Time created: 5/11/2018  9:09 AM     Interval History:  Patient seen on EPS rounds.  84-year-old woman from [facility] who has A. fib on sotalol and with diltiazem had junctional bradycardia then significant tachycardia on sotalol and metoprolol failed amiodarone in the past and failed cardioversion ×2 in the emergency room. History of paroxysmal A. Fib on sotalol, on Eliquis, hypertension, back pain, arthritis     5/7/18 per Dr Jones:  IMPLANTED DEVICE INFORMATION:  Pulse generator is a SJM model [id number]  Serial # [id number]     LEAD INFORMATION:  1)Right atrial lead is a SJM model #[id number] , serial #[id number], P wave 0.4 millivolts (AF), pacing impedance 540 Ohms.     2)Right ventricular lead is a SJM model #[id number]/52, serial #[id number], R wave 12.0 millivolts, threshold 0.5 Volts at 0.5 milliseconds, pacing impedance 790 Ohms.     DEVICE PROGRAMMING:  DDD 60 -120 ppm     FLUOROSCOPY TIME: 2.7 minutes     IMPRESSIONS:  1. Successful dual chamber pacemaker implantation     COMPLICATION(S): Ptx.     CXR post CT removal Ptx resolved.  AF VRR appears under adequate control with current medical regimen.    Chief Complaint:  AF with RVR  fatigue    Review of Systems   Constitutional: Negative for chills and fever.   HENT: Negative for sore throat.         No difficulty swallowing   Eyes: Negative for blurred vision and double vision.   Respiratory: Negative for cough, shortness of breath and wheezing.    Cardiovascular: Negative for chest pain, palpitations, orthopnea, claudication, leg swelling and PND.   Gastrointestinal: Negative for abdominal pain, blood in stool, heartburn, nausea and vomiting.   Genitourinary: Negative for dysuria, frequency, hematuria and urgency.   Musculoskeletal: Negative for myalgias.   Skin: Negative.    Neurological: Negative for dizziness, speech change, focal weakness, loss of consciousness and headaches.    Endo/Heme/Allergies: Does not bruise/bleed easily.   Psychiatric/Behavioral: Negative.        Physical Exam   Constitutional: She is oriented to person, place, and time. She appears well-developed and well-nourished.   HENT:   Head: Normocephalic and atraumatic.   Eyes: Pupils are equal, round, and reactive to light.   Neck: Normal range of motion. Neck supple. No thyromegaly present.   Cardiovascular: Normal rate, regular rhythm, normal heart sounds and intact distal pulses.  Exam reveals no gallop and no friction rub.    No murmur heard.  Pulmonary/Chest: Effort normal and breath sounds normal. No respiratory distress. She has no wheezes. She has no rales. She exhibits no tenderness.   Pacemaker site uncomplicated. Tegaderm dressing removed to remain open to air.  CT site small dressing in place uncomplicated.   Abdominal: Soft. Bowel sounds are normal. She exhibits no distension. There is no tenderness. There is no guarding.   Musculoskeletal: Normal range of motion. She exhibits no edema.   Neurological: She is alert and oriented to person, place, and time.   Skin: Skin is warm and dry.   Psychiatric: She has a normal mood and affect.       Hemodynamics:  Temp (24hrs), Av.4 °C (97.6 °F), Min:36.1 °C (97 °F), Max:36.7 °C (98.1 °F)  Temperature: 36.6 °C (97.9 °F)  Pulse  Av.9  Min: 49  Max: 136   Blood Pressure : 128/69     Respiratory:    Respiration: 16, Pulse Oximetry: 92 %     Work Of Breathing / Effort: Mild  RML Breath Sounds: Diminished, RLL Breath Sounds: Diminished, LLL Breath Sounds: Diminished  Fluids:     Weight: 64.5 kg (142 lb 3.2 oz)  GI/Nutrition:  Orders Placed This Encounter   Procedures   • DIET ORDER     Standing Status:   Standing     Number of Occurrences:   1     Order Specific Question:   Diet:     Answer:   Cardiac [6]     Order Specific Question:   Diet:     Answer:   Regular [1]     Lab Results:  Recent Labs      18   0106  05/10/18   0604   WBC  9.0  6.5   RBC  4.07*   3.90*   HEMOGLOBIN  12.2  12.0   HEMATOCRIT  37.5  35.3*   MCV  92.1  90.5   MCH  30.0  30.8   MCHC  32.5*  34.0   RDW  47.5  45.9   PLATELETCT  181  180   MPV  8.9*  8.9*     Recent Labs      05/09/18   0106  05/10/18   0326   SODIUM  131*  130*   POTASSIUM  4.2  5.0   CHLORIDE  98  99   CO2  24  22   GLUCOSE  128*  103*   BUN  28*  28*   CREATININE  0.95  0.98   CALCIUM  8.6  9.2                         Medical Decision Making, by Problem:  Active Hospital Problems    Diagnosis   • Paroxysmal atrial fibrillation (HCC) [I48.0]   • Leukocytosis [D72.829]   • Cystitis [N30.90]   • GERD (gastroesophageal reflux disease) [K21.9]   • Hyperkalemia [E87.5]   • Sepsis (HCC) [A41.9]   • Iatrogenic pneumothorax, left [J95.811]       Plan:  Okay for discharge per EPS.  1 week device check today by MEGHNA.  She has a follow up in June with Ashley LEONE. AF response rate will be reviewed as well as device.   Medications would leave her on short acting Metoprolol twice daily.  Would consolidate Diltiazem to 240 mgs if she has been tolerating it.  Other medications unchanged.  ADDENDUM:  Device interrogation intact this AM .  Thresholds intact.  Ok to go home next check in 6 weeks has been arranged.    Quality-Core Measures   Reviewed items::  EKG reviewed, Labs reviewed, Medications reviewed and Radiology images reviewed

## 2018-05-11 NOTE — DISCHARGE PLANNING
Anticipated Discharge Disposition: Home    Action: LSW met with pt at bedside to discuss HH. Pt is not interested in HH at this time but has a few questions about her pacemaker. LSW called Alysha Mccloud (x [phone]) and left a message for her about pt.     Barriers to Discharge: None    Plan: Discharge home today once medically cleared. Pt's daughter is transporting pt home.

## 2018-05-11 NOTE — DISCHARGE PLANNING
Anticipated Discharge Disposition: Home    Action: LSW met with pt at bedside and explained Medicare rights. Pt signed IMM. Pt stated that she received a call with information answering her questions about her pacemaker.     Barriers to Discharge: None    Plan: Home after medically cleared.

## 2018-05-11 NOTE — FACE TO FACE
Face to Face Supporting Documentation - Home Health    The encounter with this patient was in whole or in part the primary reason for home health admission.    Date of encounter:   Patient:                    MRN:                       YOB: 2018  Jeny Bennett  [id number]  5/19/1933     Home health to see patient for:  Skilled Nursing care for assessment, interventions & education, Medical social work consult, Home health aide, Physical Therapy evaluation and treatment and Occupational therapy evaluation and treatment    Skilled need for:  Surgical Aftercare Pacemaker    Skilled nursing interventions to include:  Comment: None    Homebound status evidenced by:  Need the aid of supportive devices such as crutches, canes, wheelchairs or walkers. Leaving home requires a considerable and taxing effort. There is a normal inability to leave the home.    Community Physician to provide follow up care: Cee Ramos M.D.     Optional Interventions? No      I certify the face to face encounter for this home health care referral meets the CMS requirements and the encounter/clinical assessment with the patient was, in whole, or in part, for the medical condition(s) listed above, which is the primary reason for home health care. Based on my clinical findings: the service(s) are medically necessary, support the need for home health care, and the homebound criteria are met.  I certify that this patient has had a face to face encounter by myself.  Jessika Jenkins D.O. - NPI: [id number]

## 2018-05-11 NOTE — PROGRESS NOTES
Bedside report received from ANABELLE Villa at 1900. POC discussed with pt; all questions answered at this time. White board updated. Appropriate functioning of tele monitor confirmed. All needs met at this time.

## 2018-05-11 NOTE — PROGRESS NOTES
Monitor Summary    Partially paced Afib 70-80    .18/.08/.40    Monitor room called at 1700 and said she is in and out of BBB, HR jumping into the 140's   QBPC pt. already junaid'd w/ PCP 4/25/18

## 2018-05-12 NOTE — DISCHARGE SUMMARY
CHIEF COMPLAINT ON ADMISSION  Chief Complaint   Patient presents with   • Bradycardia     Transfer from [place], pt initially had HR of 32, received 1mg atropine. Per report alternating between idioventricular jo and afib, which she has a hx of. Started on cardizem 2 wks ago for afib.    • Other     Hyperkalemia, received calcium gluconate, bicarb, d50, and 10 units regular insulin at OSH.        CODE STATUS  Full Code    HPI & HOSPITAL COURSE  This is a 84 y.o. female admitted 5/5/2018 with bradycardia / hyperkalemia from OSH. She has a history of underlying A-Fib. Patient of Dr Maddox in outpatient setting. AC with Eliquis and was on Sotalol / Diltiazem prior to presentation. Patient presented to the outlying facility with fatigue, weakness and near syncope x 24 hours. She was found to have bradycardia with hyperkalemia there and transferred to our emergency room for further evaluation and management. Cardiology was consulted and recommended pacemaker placement. The patient had a pacemaker placed on 5/7/2018 that was complicated by a left-sided pneumothorax. A left chest tube was placed. On 5/8 she showed increased size of the left pneumothorax therefore a chest tube allergic however was replaced. She was requiring 4 L of oxygen to maintain saturations greater than 90. Her oxygen was slowly weaned after the larger chest tube was placed. On 5/9 the chest tube was clamped and showed a very small residual amount of pneumothorax. Her heart rate, on diltiazem rather than sotalol therapy was well controlled, and diltiazem was uptitrated. Her chest tube was removed on 5/10 and she continued to remain stable as far as blood pressure, symptoms of shortness of breath and oxygen saturations. Her pacemaker was interrogated on Friday 5/11 and is found to be functioning well. She had weaned successfully to room air and was doing quite well with ambulation. Abdomen was made for her to follow up with electrophysiology to  continue ongoing monitoring with her pacemaker. The patient doesn't [place] for, she will be coming back to [place] for her cardiac follow-ups.    She was offered home health however the patient declined as she does have good support at home.    The patient met 2-midnight criteria for an inpatient stay at the time of discharge.    Therefore, she is discharged in good and stable condition with close outpatient follow-up.    SPECIFIC OUTPATIENT FOLLOW-UP  F/U with EP as scheduled  Use history or precautions as instructed until clear bilaterally to physiology    DISCHARGE PROBLEM LIST  Active Problems:    Paroxysmal atrial fibrillation (HCC) POA: Yes    Leukocytosis POA: Yes    Cystitis POA: Yes    Hyperkalemia POA: Yes    Sepsis (HCC) POA: Yes    GERD (gastroesophageal reflux disease) POA: Yes    Iatrogenic pneumothorax, left POA: Unknown    S/P placement of cardiac pacemaker POA: Unknown  Resolved Problems:    * No resolved hospital problems. *      FOLLOW UP  Future Appointments  Date Time Provider Department Center   6/19/2018 12:40 PM KIRAN GutierrezAB None   9/6/2018 12:45 PM TOD Barreto None     Cee Ramos M.D.  [address] [facility] [address]  [phone]    Go on 5/26/2018  Please arrive at 12:45 pm for your appointment.      MEDICATIONS ON DISCHARGE   Jeny Bennett   Home Medication Instructions IRIS:[id number]    Printed on:05/11/18 6260   Medication Information                      DILTIAZem CD (CARDIZEM CD) 240 MG CAPSULE SR 24 HR  Take 1 Cap by mouth every day.             ELIQUIS 2.5 MG Tab  Take 2 Tabs by mouth 2 Times a Day.             fluticasone (FLONASE) 50 MCG/ACT nasal spray  Spray 1 Spray in nose every day.             hydrocodone-acetaminophen (NORCO) 7.5-325 MG per tablet  Take 1-2 Tabs by mouth every four hours as needed for Severe Pain.             ipratropium (ATROVENT) 0.06 % Solution  Spray 1 Spray in nose every day.             meloxicam (MOBIC)  7.5 MG TABS  Take 7.5 mg by mouth every day.             metoprolol (LOPRESSOR) 100 MG Tab  Take 1 Tab by mouth 2 Times a Day.             omeprazole (PRILOSEC) 20 MG delayed-release capsule  Take 20 mg by mouth every day.                 DIET  Orders Placed This Encounter   Procedures   • DIET ORDER     Standing Status:   Standing     Number of Occurrences:   1     Order Specific Question:   Diet:     Answer:   Cardiac [6]     Order Specific Question:   Diet:     Answer:   Regular [1]       ACTIVITY  Pacemaker precautions    CONSULTATIONS  Dr. Jones - JASS Mccloud EP APN  Dr. Hahn - IR    PROCEDURES  5/7: Pacemaker placement. Pulse generator is a SJM model [id number]  Serial # [id number]  1)Right atrial lead is a SJM model #[id number]/46 , serial #[id number], P wave 0.4 millivolts (AF), pacing impedance 540 Ohms.   2)Right ventricular lead is a SJM model #[id number]/52, serial #[id number], R wave 12.0 millivolts, threshold 0.5 Volts at 0.5 milliseconds, pacing impedance 790 Ohms.     5/7: Left chest tube placement  5/8: Left chest tube repositioning with upsize    LABORATORY  Lab Results   Component Value Date/Time    SODIUM 130 (L) 05/10/2018 03:26 AM    POTASSIUM 5.0 05/10/2018 03:26 AM    CHLORIDE 99 05/10/2018 03:26 AM    CO2 22 05/10/2018 03:26 AM    GLUCOSE 103 (H) 05/10/2018 03:26 AM    BUN 28 (H) 05/10/2018 03:26 AM    CREATININE 0.98 05/10/2018 03:26 AM        Lab Results   Component Value Date/Time    WBC 6.5 05/10/2018 06:04 AM    HEMOGLOBIN 12.0 05/10/2018 06:04 AM    HEMATOCRIT 35.3 (L) 05/10/2018 06:04 AM    PLATELETCT 180 05/10/2018 06:04 AM        Total time of the discharge process exceeds 45 minutes

## 2018-05-15 ENCOUNTER — TELEPHONE (OUTPATIENT)
Dept: CARDIOLOGY | Facility: MEDICAL CENTER | Age: 83
End: 2018-05-15

## 2018-05-15 NOTE — TELEPHONE ENCOUNTER
Pt. Advised.      Message   Received: Today   Message Contents   KIRAN Reilly L.P.N.   Caller: Unspecified (Today, 10:13 AM)             Yes she can attend as long as she feels well enough.

## 2018-05-15 NOTE — TELEPHONE ENCOUNTER
Pt. Feels okay. Reviewed post op instructions. She has [facility] with PCP in [place] next week. She is wondering if she can drive with her family to [place] for her granddaughter's graduation and her birthday celebration on 5-.   To Alysha.

## 2018-05-15 NOTE — TELEPHONE ENCOUNTER
----- Message from Roxana Abbasi sent at 5/15/2018  9:25 AM PDT -----  Regarding: was seen by PB in hospital has question's about after care   Contact: [phone]  MIHAI/Irma Sage was seen by PB in hospital last week, was discharged on 5/11. States she was told by PB if she had any question's about after care to call office. She would please like a call back at [phone].

## 2018-05-24 ENCOUNTER — TELEPHONE (OUTPATIENT)
Dept: CARDIOLOGY | Facility: MEDICAL CENTER | Age: 83
End: 2018-05-24

## 2018-05-24 NOTE — TELEPHONE ENCOUNTER
Pt. Traveled to [place] last weekend. She had some dependent edema prior to trip but it has  Worsened. Dependent edema goes up to her thighs. She reported weakness and shortness of breath with activity. She doesn't monitor weight. She had PM implant 5-7-18 with left-sided pneumothorax. She was DC'd off Spironolactone 25mg and Lasix 20mg QD.   To [facility]. Pt. Lives in [place].

## 2018-05-24 NOTE — TELEPHONE ENCOUNTER
Called pt. To advise.     Message   Received: Today   Message Contents   KIRAN Reilly L.P.N.   Caller: Unspecified (Today, 10:01 AM)             To ER

## 2018-05-24 NOTE — TELEPHONE ENCOUNTER
----- Message from Rhonda Oliver sent at 5/24/2018  9:45 AM PDT -----  Regarding: Problems with weakness and swelling in legs  PB/Irma    Patient had a Pacemaker implanted on 5/7 by Dr Jones. She's having problems with weakness and swelling in her legs and wants a call back at [phone].

## 2018-05-30 ENCOUNTER — TELEPHONE (OUTPATIENT)
Dept: CARDIOLOGY | Facility: MEDICAL CENTER | Age: 83
End: 2018-05-30

## 2018-05-30 RX ORDER — SPIRONOLACTONE 25 MG/1
12.5 TABLET ORAL DAILY
Qty: 30 TAB | Refills: 3 | COMMUNITY
Start: 2018-05-30 | End: 2022-11-10

## 2018-05-30 RX ORDER — FUROSEMIDE 40 MG/1
40 TABLET ORAL DAILY
Qty: 30 TAB | COMMUNITY
Start: 2018-05-30 | End: 2018-06-26 | Stop reason: SDUPTHER

## 2018-05-30 NOTE — TELEPHONE ENCOUNTER
----- Message from Brittny Lynn sent at 5/30/2018 12:42 PM PDT -----  Regarding: clarification on Eliquis medication  MIHAI/Candice      Patient needs clarification on her Eliquis medication. She can be reached at [phone].

## 2018-05-30 NOTE — TELEPHONE ENCOUNTER
Pt calling w/updates on meds after d/c from [facility] in [place] (see 5/24 telephone call). Reports her LE swelling is much better and she is currently back on spironolactone 12.5 mg and lasix 40 mg. She reports they d/c'd her cardizem as well, as her BP has been a bit low--107 systolic. Pt reports she has a PCP appt Friday and will ask them to forward any labs or med changes at that time, if needed. Med list updated.    HANNAH Encarnacion.

## 2018-06-09 NOTE — CONSULTS
Reason for Consult:  Asked by Dr Garcia to see this patient with atrial fibrillation initially bradycardia response now rapid ventricular response  Patient's PCP: Cee Ramos M.D.    CC:   Chief Complaint   Patient presents with   • Bradycardia     Transfer from [place], pt initially had HR of 32, received 1mg atropine. Per report alternating between idioventricular jo and afib, which she has a hx of. Started on cardizem 2 wks ago for afib.    • Other     Hyperkalemia, received calcium gluconate, bicarb, d50, and 10 units regular insulin at OSH.        HPI: This is a very pleasant 84-year-old woman with prior history of atrial fibrillation on rhythm control with sotalol who is been having issues with recurrent atrial fibrillation with attempt at rate control strategy which she is felt she was started on diltiazem on top of her sotalol and had some bradycardias and eventually today presented to the emergency room and [place] for bradycardia where she was found to have a junctional rhythm very slow in the 20s with a left bundle branch block she was given atropine D50 glucagon insulin calcium gluconate and bicarb for acid-base disturbances Osorio disturbances with improvement in her rhythm now her heart rates in the 100s in A. fib    Medications / Drug list prior to admission:  No current facility-administered medications on file prior to encounter.      Current Outpatient Prescriptions on File Prior to Encounter   Medication Sig Dispense Refill   • furosemide (LASIX) 20 MG Tab Take 1 Tab by mouth 1 time daily as needed. 30 Tab 6   • spironolactone (ALDACTONE) 25 MG Tab Take 1 Tab by mouth every day. 30 Tab 11   • omeprazole (PRILOSEC) 20 MG delayed-release capsule      • ELIQUIS 2.5 MG Tab      • amoxicillin (AMOXIL) 500 MG Cap      • SOTALOL AF 80 MG Tab      • fluticasone (FLONASE) 50 MCG/ACT nasal spray      • ipratropium (ATROVENT) 0.06 % Solution      • mupirocin (BACTROBAN) 2 % Ointment      • famotidine  (PEPCID) 20 MG Tab Take 20 mg by mouth 2 times a day.     • venlafaxine XR (EFFEXOR XR) 37.5 MG CAPSULE SR 24 HR Take 37.5 mg by mouth every day.     • meloxicam (MOBIC) 7.5 MG TABS Take 7.5 mg by mouth 2 Times a Day.     • hydrocodone-acetaminophen (NORCO) 7.5-325 MG per tablet Take 1-2 Tabs by mouth every four hours as needed for Severe Pain.         Current list of administered Medications:    Current Facility-Administered Medications:   •  sotalol (BETAPACE) tablet 80 mg, 80 mg, Oral, Once, Royal Garcia M.D.  •  magnesium sulfate IVPB premix 4 g, 4 g, Intravenous, Once, Royal Garcia M.D.  •  potassium chloride SA (Kdur) tablet 40 mEq, 40 mEq, Oral, Once, Royal Garcia M.D.  •  apixaban (ELIQUIS) 2.5mg tablet 2.5 mg, 2.5 mg, Oral, BID, Eduardo Manuel M.D.  •  sotalol (BETAPACE) tablet 80 mg, 80 mg, Oral, TWICE DAILY, Eduardo Manuel M.D.    Current Outpatient Prescriptions:   •  furosemide (LASIX) 20 MG Tab, Take 1 Tab by mouth 1 time daily as needed., Disp: 30 Tab, Rfl: 6  •  spironolactone (ALDACTONE) 25 MG Tab, Take 1 Tab by mouth every day., Disp: 30 Tab, Rfl: 11  •  omeprazole (PRILOSEC) 20 MG delayed-release capsule, , Disp: , Rfl:   •  ELIQUIS 2.5 MG Tab, , Disp: , Rfl:   •  amoxicillin (AMOXIL) 500 MG Cap, , Disp: , Rfl:   •  SOTALOL AF 80 MG Tab, , Disp: , Rfl:   •  fluticasone (FLONASE) 50 MCG/ACT nasal spray, , Disp: , Rfl:   •  ipratropium (ATROVENT) 0.06 % Solution, , Disp: , Rfl:   •  mupirocin (BACTROBAN) 2 % Ointment, , Disp: , Rfl:   •  famotidine (PEPCID) 20 MG Tab, Take 20 mg by mouth 2 times a day., Disp: , Rfl:   •  venlafaxine XR (EFFEXOR XR) 37.5 MG CAPSULE SR 24 HR, Take 37.5 mg by mouth every day., Disp: , Rfl:   •  meloxicam (MOBIC) 7.5 MG TABS, Take 7.5 mg by mouth 2 Times a Day., Disp: , Rfl:   •  hydrocodone-acetaminophen (NORCO) 7.5-325 MG per tablet, Take 1-2 Tabs by mouth every four hours as needed for Severe Pain., Disp: , Rfl:     Past Medical  "History:   Diagnosis Date   • A-fib (HCC)    • Arrhythmia    • Arthritis    • Back pain    • Fall 12-   • Hypertension        History reviewed. No pertinent surgical history.    Family History   Problem Relation Age of Onset   • Arthritis Mother    • Cancer Mother    • Hypertension Mother    • Hypertension Father    • Heart Disease Father    • Hyperlipidemia Father    • Lung Disease Sister    • Hypertension Sister        Social History     Social History   • Marital status: Unknown     Spouse name: N/A   • Number of children: N/A   • Years of education: N/A     Occupational History   • Not on file.     Social History Main Topics   • Smoking status: Former Smoker     Packs/day: 0.50     Types: Cigarettes     Quit date: 1/1/1989   • Smokeless tobacco: Never Used   • Alcohol use Yes      Comment: occasional glass of wine   • Drug use: No   • Sexual activity: Not on file     Other Topics Concern   • Not on file     Social History Narrative   • No narrative on file       ALLERGIES:  Allergies   Allergen Reactions   • Amiodarone      Atrial fibrillation..organ prob's   • Sulfa Drugs      Rash, chills, fever   • Augmentin        Review of systems:  A detailed review of symptoms was reviewed with patient. This is reviewed in H&P and PMH. ALL OTHERS reviewed and negative    Physical exam:  Patient Vitals for the past 24 hrs:   BP Temp Pulse Resp SpO2 Height Weight   05/05/18 0715 - - (!) 116 - 98 % - -   05/05/18 0700 - - (!) 117 - 97 % - -   05/05/18 0645 - - (!) 106 (!) 22 90 % - -   05/05/18 0630 - - (!) 108 - 95 % - -   05/05/18 0617 - - (!) 108 - 96 % - -   05/05/18 0601 - - 98 (!) 21 94 % - -   05/05/18 0545 - - (!) 101 - 96 % - -   05/05/18 0530 - - (!) 105 (!) 23 92 % - -   05/05/18 0526 105/69 36.4 °C (97.6 °F) (!) 103 18 - - -   05/05/18 0524 - - (!) 101 18 95 % 1.626 m (5' 4\") 66 kg (145 lb 8.1 oz)       General: No acute distress.   HEENT: OP clear   Neck: No bruits No JVD.   CVS: Irregular. S1 + S2. No " M/R/G  Resp: CTAB. No wheezing or crackles/rhonchi.  Abdomen: Soft, NT, ND,  Skin: Grossly nothing acute no obvious rashes  Neurological: grossly within normal range   Extremities: Pulse 2+ in b/l LE.  No edema. No cyanosis.     Data:  Laboratory studies:  Recent Results (from the past 24 hour(s))   EKG (ER)    Collection Time: 18  5:22 AM   Result Value Ref Range    Report       [facility] Emergency Dept.    Test Date:  2018  Pt Name:    JAHAIRA GARCIA           Department: ER  MRN:        [id number]                      Room:       [facility]  Gender:     Female                       Technician: [id number]  :        1933                   Requested By:ER TRIAGE PROTOCOL  Order #:    [id number]                    Reading MD: Royal Garcia MD    Measurements  Intervals                                Axis  Rate:       97                           P:  SD:                                      QRS:        -45  QRSD:       154                          T:          105  QT:         424  QTc:        539    Interpretive Statements  ATRIAL FIBRILLATION  LEFT BUNDLE BRANCH BLOCK  Compared to ECG 12/15/2016 10:26:28  Left bundle-branch block now present  Sinus rhythm no longer present  Atrial premature complex(es) no longer present    Electronically Signed On 2018 7:34:37 PDT by Royal Garcia MD     CBC w/ Differential    Collection Time: 18  5:30 AM   Result Value Ref Range    WBC 12.6 (H) 4.8 - 10.8 K/uL    RBC 4.68 4.20 - 5.40 M/uL    Hemoglobin 14.3 12.0 - 16.0 g/dL    Hematocrit 40.1 37.0 - 47.0 %    MCV 85.7 81.4 - 97.8 fL    MCH 30.6 27.0 - 33.0 pg    MCHC 35.7 (H) 33.6 - 35.0 g/dL    RDW 42.3 35.9 - 50.0 fL    Platelet Count 279 164 - 446 K/uL    MPV 8.6 (L) 9.0 - 12.9 fL    Neutrophils-Polys 81.30 (H) 44.00 - 72.00 %    Lymphocytes 7.90 (L) 22.00 - 41.00 %    Monocytes 9.90 0.00 - 13.40 %    Eosinophils 0.20 0.00 - 6.90 %    Basophils 0.20 0.00 - 1.80 %    Immature  Granulocytes 0.50 0.00 - 0.90 %    Nucleated RBC 0.00 /100 WBC    Neutrophils (Absolute) 10.26 (H) 2.00 - 7.15 K/uL    Lymphs (Absolute) 1.00 1.00 - 4.80 K/uL    Monos (Absolute) 1.25 (H) 0.00 - 0.85 K/uL    Eos (Absolute) 0.02 0.00 - 0.51 K/uL    Baso (Absolute) 0.03 0.00 - 0.12 K/uL    Immature Granulocytes (abs) 0.06 0.00 - 0.11 K/uL    NRBC (Absolute) 0.00 K/uL   Complete Metabolic Panel (CMP)    Collection Time: 05/05/18  5:30 AM   Result Value Ref Range    Sodium 133 (L) 135 - 145 mmol/L    Potassium 3.8 3.6 - 5.5 mmol/L    Chloride 95 (L) 96 - 112 mmol/L    Co2 30 20 - 33 mmol/L    Anion Gap 8.0 0.0 - 11.9    Glucose 68 65 - 99 mg/dL    Bun 27 (H) 8 - 22 mg/dL    Creatinine 0.92 0.50 - 1.40 mg/dL    Calcium 10.0 8.5 - 10.5 mg/dL    AST(SGOT) 18 12 - 45 U/L    ALT(SGPT) 25 2 - 50 U/L    Alkaline Phosphatase 72 30 - 99 U/L    Total Bilirubin 0.8 0.1 - 1.5 mg/dL    Albumin 4.0 3.2 - 4.9 g/dL    Total Protein 6.8 6.0 - 8.2 g/dL    Globulin 2.8 1.9 - 3.5 g/dL    A-G Ratio 1.4 g/dL   Btype Natriuretic Peptide    Collection Time: 05/05/18  5:30 AM   Result Value Ref Range    B Natriuretic Peptide 584 (H) 0 - 100 pg/mL   Troponin STAT    Collection Time: 05/05/18  5:30 AM   Result Value Ref Range    Troponin I 0.01 0.00 - 0.04 ng/mL   Magnesium    Collection Time: 05/05/18  5:30 AM   Result Value Ref Range    Magnesium 1.8 1.5 - 2.5 mg/dL   Phosphorus    Collection Time: 05/05/18  5:30 AM   Result Value Ref Range    Phosphorus 2.5 2.5 - 4.5 mg/dL   LACTIC ACID    Collection Time: 05/05/18  5:30 AM   Result Value Ref Range    Lactic Acid 2.1 (H) 0.5 - 2.0 mmol/L   PT/INR    Collection Time: 05/05/18  5:30 AM   Result Value Ref Range    PT 13.6 12.0 - 14.6 sec    INR 1.07 0.87 - 1.13   APTT    Collection Time: 05/05/18  5:30 AM   Result Value Ref Range    APTT 30.7 24.7 - 36.0 sec   TSH (for screening thyroid dysfunction)    Collection Time: 05/05/18  5:30 AM   Result Value Ref Range    TSH 1.730 0.380 - 5.330 uIU/mL    Free Thyroxine (add to TSH for patients with existing thyroid dysfunction)    Collection Time: 05/05/18  5:30 AM   Result Value Ref Range    Free T-4 1.45 (H) 0.53 - 1.43 ng/dL   DIGOXIN    Collection Time: 05/05/18  5:30 AM   Result Value Ref Range    Digoxin 0.1 (L) 0.8 - 2.0 ng/mL   ESTIMATED GFR    Collection Time: 05/05/18  5:30 AM   Result Value Ref Range    GFR If African American >60 >60 mL/min/1.73 m 2    GFR If Non African American 58 (A) >60 mL/min/1.73 m 2   ACCU-CHEK GLUCOSE    Collection Time: 05/05/18  5:44 AM   Result Value Ref Range    Glucose - Accu-Ck 77 65 - 99 mg/dL   Urinalysis, culture if indicated    Collection Time: 05/05/18  6:12 AM   Result Value Ref Range    Color Yellow     Character Clear     Specific Gravity 1.011 <1.035    Ph >=9.0 (A) 5.0 - 8.0    Glucose 250 (A) Negative mg/dL    Ketones Negative Negative mg/dL    Protein Negative Negative mg/dL    Bilirubin Negative Negative    Urobilinogen, Urine 0.2 Negative    Nitrite Negative Negative    Leukocyte Esterase Trace (A) Negative    Occult Blood Small (A) Negative    Micro Urine Req Microscopic    URINE MICROSCOPIC (W/UA)    Collection Time: 05/05/18  6:12 AM   Result Value Ref Range    WBC 2-5 /hpf    RBC 2-5 (A) /hpf    Bacteria Negative None /hpf    Epithelial Cells Few /hpf    Hyaline Cast 0-2 /lpf       Imaging:  OUTSIDE IMAGES-DX CHEST   Final Result              EKG : personally reviewed by me from [place] she has A. fib with junctional escape rhythm very slow in the 20s left bundle branch block subsequent EKG shows improvement in heart rate and telemetry confirms A. fib    All pertinent features of laboratory and imaging reviewed including primary images where applicable    Assessment / Plan:  Atrial fibrillation -we discussed available options she agrees to trial at cardioversion and continue on sotalol and Eliquis perhaps she will need a pacemaker to tolerate higher doses of rate control agents otherwise as she does live  quite remote.  She has been compliant with her any coagulation except for one dose missed last night.      It is my pleasure to participate in the care of Ms. Bennett.  Please do not hesitate to contact me with questions or concerns.    Eduardo Manuel MD PhD FACC  Cardiologist [facility]    5/5/2018   aching/intermittent/cramping

## 2018-06-11 ENCOUNTER — TELEPHONE (OUTPATIENT)
Dept: CARDIOLOGY | Facility: MEDICAL CENTER | Age: 83
End: 2018-06-11

## 2018-06-11 NOTE — TELEPHONE ENCOUNTER
"Spoke with pt., who lives in [place]. She has PMC 6-19-18 with Ashley. She went to [facility] ER 0n 5-24-18 for edema. She was DC'd back on Lasix 40mg and Spironolactone 12.5mg QD. She saw PCP last week who reduced Lopressor to 50mg BID. Other meds per med list.   Pt. Is discouraged because she still doesn't \"feel normal\". Explained to pt. That had pneumothorax post PM implant and recovery can take awhile. BP's the past week=110's/ 80, which pt. Thinks are low. Reassured pt. That BP's aren't really low.   To be discussed further with Ashley in [facility].  "

## 2018-06-11 NOTE — TELEPHONE ENCOUNTER
----- Message from Roxana Abbasi sent at 6/11/2018 10:44 AM PDT -----  Regarding: has question's about medications and appt  Contact: [phone]  MIHAI/Irma     Pt reports she and pacemaker procedure about a month ago and has some question's in regards to medications and appt. She can be reached at [phone].

## 2018-06-19 ENCOUNTER — NON-PROVIDER VISIT (OUTPATIENT)
Dept: CARDIOLOGY | Facility: MEDICAL CENTER | Age: 83
End: 2018-06-19
Payer: MEDICARE

## 2018-06-19 VITALS
SYSTOLIC BLOOD PRESSURE: 100 MMHG | WEIGHT: 142 LBS | DIASTOLIC BLOOD PRESSURE: 70 MMHG | BODY MASS INDEX: 24.37 KG/M2 | OXYGEN SATURATION: 92 %

## 2018-06-19 DIAGNOSIS — Z95.0 S/P PLACEMENT OF CARDIAC PACEMAKER: ICD-10-CM

## 2018-06-19 DIAGNOSIS — I44.30 AV BLOCK: ICD-10-CM

## 2018-06-19 DIAGNOSIS — I48.0 PAROXYSMAL ATRIAL FIBRILLATION (HCC): ICD-10-CM

## 2018-06-19 DIAGNOSIS — Z79.01 CHRONIC ANTICOAGULATION: ICD-10-CM

## 2018-06-19 PROBLEM — D72.829 LEUKOCYTOSIS: Status: RESOLVED | Noted: 2018-05-06 | Resolved: 2018-06-19

## 2018-06-19 PROBLEM — Z79.899 HIGH RISK MEDICATION USE: Status: RESOLVED | Noted: 2018-03-20 | Resolved: 2018-06-19

## 2018-06-19 PROBLEM — A41.9 SEPSIS (HCC): Status: RESOLVED | Noted: 2018-05-05 | Resolved: 2018-06-19

## 2018-06-19 PROBLEM — E87.5 HYPERKALEMIA: Status: RESOLVED | Noted: 2018-05-05 | Resolved: 2018-06-19

## 2018-06-19 PROCEDURE — 93288 INTERROG EVL PM/LDLS PM IP: CPT | Performed by: NURSE PRACTITIONER

## 2018-06-19 RX ORDER — PNV NO.95/FERROUS FUM/FOLIC AC 28MG-0.8MG
TABLET ORAL
COMMUNITY
End: 2021-07-30

## 2018-06-19 RX ORDER — DIGOXIN 125 MCG
125 TABLET ORAL DAILY
Qty: 35 TAB | Refills: 3 | Status: SHIPPED | OUTPATIENT
Start: 2018-06-19 | End: 2018-10-29 | Stop reason: SDUPTHER

## 2018-06-19 RX ORDER — METOPROLOL TARTRATE 100 MG/1
100 TABLET ORAL 2 TIMES DAILY
Qty: 60 TAB | Refills: 2 | Status: SHIPPED | OUTPATIENT
Start: 2018-06-19 | End: 2018-09-19 | Stop reason: SDUPTHER

## 2018-06-19 NOTE — LETTER
[facility]   [address]   [address]   [place] [address]  Phone: [phone]  Fax: [phone]              Jeny Bennett  5/19/1933    Encounter Date: 6/19/2018    KIRAN Gutierrez          PROGRESS NOTE:  Patient had a history of paroxysmal atrial fibrillation, treated with Sotalol for many years. She is also anticoagulated with Eliquis.    On 5/5/2018, she was transferred from [facility] for bradycardia (complete heart block), and pacemaker was placed on 5/7/2018. This was complicated by left pneumothorax. Sotalol was stopped, and she was started on Diltiazem 240mg.    She is here today for follow-up. She just hasn't felt well since being on the Diltiazem; very tired, dizzy and lethargic. No chest pain, pressure or discomfort; no symptomatic palpitations; breathing is stable, with no orthopnea or PND. She did develop some progressive edema, and Lasix and potassium were restarted.    Device is working normally. Mode switching episodes 100% of the time (-135bpm today); she is anticoagulated with Eliquis.  Normal sensing of RA and RV leads; stable capture of RV lead; stable impedances. Battery longevity is 8.7-9.9 years.  No changes are made today.    To STOP Diltiazem; Increase Metoprolol to 100mg BID; add Digoxin 0.125mg, 3 tablets tonight, and then 1 tablet once daily.    FU in 3 weeks for next PM check with me in [place]. To call beforehand if still not feeling well.    Collaborating MD: Gala Jauregui Recipients

## 2018-06-19 NOTE — PROGRESS NOTES
Patient had a history of paroxysmal atrial fibrillation, treated with Sotalol for many years. She is also anticoagulated with Eliquis.    On 5/5/2018, she was transferred from [facility] for bradycardia (complete heart block), and pacemaker was placed on 5/7/2018. This was complicated by left pneumothorax. Sotalol was stopped, and she was started on Diltiazem 240mg.    She is here today for follow-up. She just hasn't felt well since being on the Diltiazem; very tired, dizzy and lethargic. No chest pain, pressure or discomfort; no symptomatic palpitations; breathing is stable, with no orthopnea or PND. She did develop some progressive edema, and Lasix and potassium were restarted.    Device is working normally. Mode switching episodes 100% of the time (-135bpm today); she is anticoagulated with Eliquis.  Normal sensing of RA and RV leads; stable capture of RV lead; stable impedances. Battery longevity is 8.7-9.9 years.  No changes are made today.    To STOP Diltiazem; Increase Metoprolol to 100mg BID; add Digoxin 0.125mg, 3 tablets tonight, and then 1 tablet once daily.    FU in 3 weeks for next PM check with me in [place]. To call beforehand if still not feeling well.    Collaborating MD: Gala

## 2018-06-25 ENCOUNTER — TELEPHONE (OUTPATIENT)
Dept: CARDIOLOGY | Facility: MEDICAL CENTER | Age: 83
End: 2018-06-25

## 2018-06-25 NOTE — TELEPHONE ENCOUNTER
Pt has lost 7 lbs. since last seeing Ashley for PM check on 6/19. Currently takes spironolactone 12.5 mg & lasix 40 mg Q AM. The lasix had been increased from 20 mg to 40  mg. after last hospital admission 5/24 for edema. She now wonders if it's too much for her & she's lost too much water wt.? Edema is gone now. -125/79 HR 87. Next [facility] is for PMC w/ Ashley on 7/11. She will hold her lasix tomorrow a.m. until she hears back from us.   To Ashley [place] to advise.

## 2018-06-25 NOTE — TELEPHONE ENCOUNTER
----- Message from Cee Ernandez sent at 6/25/2018 10:20 AM PDT -----  Regarding: question about weight loss  Contact: [phone]      AB/deandre Sage calling to ask a few questions, one primarily is about her losing weight.    Please call Jeny at .

## 2018-06-26 DIAGNOSIS — R60.0 LOCALIZED EDEMA: ICD-10-CM

## 2018-06-26 RX ORDER — FUROSEMIDE 40 MG/1
20 TABLET ORAL DAILY
Qty: 30 TAB | COMMUNITY
Start: 2018-06-26 | End: 2018-10-15

## 2018-06-26 NOTE — TELEPHONE ENCOUNTER
Decrease Lasix from 40mg back to 20mg. Continue Aldactone 12.5mg once daily.  Check BMP before FU on 7/11.  Thanks, AB

## 2018-06-26 NOTE — TELEPHONE ENCOUNTER
Pt. notified to continue spironolactone 12.5 mg & reduce furosemide from 40 to 20 mg. Just refilled Rx so will cut pills in half. MAR updated. Lab order mailed to do at [facility] prior to [facility].

## 2018-07-12 DIAGNOSIS — R60.0 LOCALIZED EDEMA: ICD-10-CM

## 2018-08-01 ENCOUNTER — NON-PROVIDER VISIT (OUTPATIENT)
Dept: CARDIOLOGY | Facility: PHYSICIAN GROUP | Age: 83
End: 2018-08-01
Payer: MEDICARE

## 2018-08-01 VITALS
DIASTOLIC BLOOD PRESSURE: 70 MMHG | WEIGHT: 136 LBS | HEART RATE: 72 BPM | SYSTOLIC BLOOD PRESSURE: 120 MMHG | BODY MASS INDEX: 23.22 KG/M2 | HEIGHT: 64 IN | OXYGEN SATURATION: 90 %

## 2018-08-01 DIAGNOSIS — Z79.01 CHRONIC ANTICOAGULATION: ICD-10-CM

## 2018-08-01 DIAGNOSIS — Z95.0 S/P PLACEMENT OF CARDIAC PACEMAKER: ICD-10-CM

## 2018-08-01 DIAGNOSIS — I48.0 PAROXYSMAL ATRIAL FIBRILLATION (HCC): ICD-10-CM

## 2018-08-01 DIAGNOSIS — I44.30 AV BLOCK: ICD-10-CM

## 2018-08-01 PROCEDURE — 93280 PM DEVICE PROGR EVAL DUAL: CPT | Performed by: NURSE PRACTITIONER

## 2018-08-01 NOTE — PROGRESS NOTES
Patient had PM implanted on 5/7/2018, and she is here today for final threshold checks. HR is better controlled on Metoprolol 100mg BID and Digoxin 0.125mg once daily, but she is still quite tired. No shortness of breath or edema, but just feeling very low energy.    Device is working normally. Mode switching episodes 100% of time; she is anticoagulated with Eliquis.  Normal sensing of RA and RV leads; stable capture of RV lead; stable impedances. Battery longevity is 8.5-10.0 years.     Changes today include changing base rate to DDIR at 60bpm, and rate is even more normalized.    Continue Metoprolol and Digoxin. She does have FU with Dr. MANUELA Balbuena in October 2018. FU with me at same time, to check PM.     If still feeling poorly, consider referral to EP for other antiarrhythmic (has already tried Amiodarone and Sotalol) and/or ablation (but almost might be high risk, given age).    FU sooner if clinical condition changes.    Collaborating MD: MANUELA Balbuena

## 2018-09-19 ENCOUNTER — TELEPHONE (OUTPATIENT)
Dept: CARDIOLOGY | Facility: MEDICAL CENTER | Age: 83
End: 2018-09-19

## 2018-09-19 DIAGNOSIS — I48.0 PAF (PAROXYSMAL ATRIAL FIBRILLATION) (HCC): Primary | ICD-10-CM

## 2018-09-19 DIAGNOSIS — I48.0 PAROXYSMAL ATRIAL FIBRILLATION (HCC): ICD-10-CM

## 2018-09-19 RX ORDER — METOPROLOL TARTRATE 100 MG/1
100 TABLET ORAL 2 TIMES DAILY
Qty: 60 TAB | Refills: 2 | Status: SHIPPED | OUTPATIENT
Start: 2018-09-19 | End: 2018-12-18 | Stop reason: SDUPTHER

## 2018-09-19 RX ORDER — APIXABAN 2.5 MG/1
5 TABLET, FILM COATED ORAL 2 TIMES DAILY
Qty: 60 TAB | Refills: 2 | Status: SHIPPED | OUTPATIENT
Start: 2018-09-19 | End: 2018-09-19

## 2018-09-19 NOTE — TELEPHONE ENCOUNTER
Spoke with pt with instructions to take 1 tablet, Eliquis, 5mg BID. Pt requesting for Metoprolol to be filled. Assured pt that medication will also be filled.    Metoprolol filled.

## 2018-10-15 ENCOUNTER — TELEPHONE (OUTPATIENT)
Dept: CARDIOLOGY | Facility: MEDICAL CENTER | Age: 83
End: 2018-10-15

## 2018-10-15 ENCOUNTER — HOSPITAL ENCOUNTER (OUTPATIENT)
Dept: CARDIOLOGY | Facility: MEDICAL CENTER | Age: 83
End: 2018-10-15
Attending: INTERNAL MEDICINE
Payer: MEDICARE

## 2018-10-15 ENCOUNTER — NON-PROVIDER VISIT (OUTPATIENT)
Dept: CARDIOLOGY | Facility: MEDICAL CENTER | Age: 83
End: 2018-10-15
Payer: MEDICARE

## 2018-10-15 ENCOUNTER — OFFICE VISIT (OUTPATIENT)
Dept: CARDIOLOGY | Facility: MEDICAL CENTER | Age: 83
End: 2018-10-15
Payer: MEDICARE

## 2018-10-15 VITALS
BODY MASS INDEX: 23.9 KG/M2 | SYSTOLIC BLOOD PRESSURE: 102 MMHG | HEIGHT: 64 IN | OXYGEN SATURATION: 95 % | WEIGHT: 140 LBS | DIASTOLIC BLOOD PRESSURE: 60 MMHG | HEART RATE: 75 BPM

## 2018-10-15 VITALS
HEART RATE: 75 BPM | BODY MASS INDEX: 23.9 KG/M2 | DIASTOLIC BLOOD PRESSURE: 60 MMHG | HEIGHT: 64 IN | SYSTOLIC BLOOD PRESSURE: 102 MMHG | WEIGHT: 140 LBS | OXYGEN SATURATION: 95 %

## 2018-10-15 DIAGNOSIS — M19.042 PRIMARY OSTEOARTHRITIS OF BOTH HANDS: ICD-10-CM

## 2018-10-15 DIAGNOSIS — I48.0 PAROXYSMAL ATRIAL FIBRILLATION (HCC): ICD-10-CM

## 2018-10-15 DIAGNOSIS — I44.30 AV BLOCK: ICD-10-CM

## 2018-10-15 DIAGNOSIS — Z79.01 CHRONIC ANTICOAGULATION: ICD-10-CM

## 2018-10-15 DIAGNOSIS — M19.041 PRIMARY OSTEOARTHRITIS OF BOTH HANDS: ICD-10-CM

## 2018-10-15 DIAGNOSIS — I49.5 SICK SINUS SYNDROME (HCC): ICD-10-CM

## 2018-10-15 DIAGNOSIS — Z95.0 S/P PLACEMENT OF CARDIAC PACEMAKER: ICD-10-CM

## 2018-10-15 DIAGNOSIS — I49.5 SICK SINUS SYNDROME (HCC): Primary | ICD-10-CM

## 2018-10-15 LAB
LV EJECT FRACT  99904: 60
LV EJECT FRACT MOD 2C 99903: 57.65
LV EJECT FRACT MOD 4C 99902: 62.49
LV EJECT FRACT MOD BP 99901: 58.32

## 2018-10-15 PROCEDURE — 93306 TTE W/DOPPLER COMPLETE: CPT

## 2018-10-15 PROCEDURE — 93306 TTE W/DOPPLER COMPLETE: CPT | Mod: 26 | Performed by: INTERNAL MEDICINE

## 2018-10-15 PROCEDURE — 93280 PM DEVICE PROGR EVAL DUAL: CPT | Performed by: NURSE PRACTITIONER

## 2018-10-15 PROCEDURE — 99214 OFFICE O/P EST MOD 30 MIN: CPT | Performed by: INTERNAL MEDICINE

## 2018-10-15 RX ORDER — FUROSEMIDE 20 MG/1
20 TABLET ORAL DAILY
COMMUNITY
End: 2022-05-18

## 2018-10-15 ASSESSMENT — ENCOUNTER SYMPTOMS
BRUISES/BLEEDS EASILY: 1
PALPITATIONS: 1
INSOMNIA: 1
SPUTUM PRODUCTION: 1
NECK PAIN: 1
BACK PAIN: 1

## 2018-10-15 NOTE — TELEPHONE ENCOUNTER
Per [place] call patient with ECHO results, unchanged from previous ECHO. LVM for patient with unchanged results and call back number.

## 2018-10-15 NOTE — PROGRESS NOTES
Chief Complaint   Patient presents with   • Atrial Fibrillation       Subjective:   Jeny Bennett is an 85 -year-old woman with a history of paroxysmal atrial fibrillation, and more recently diagnosed sick sinus syndrome with pacemaker implantation on 5/7/2018 ([facility]). She had previously attempted amiodarone and sotalol for her sick sinus syndrome and failed those therapies.  She has been chronically anticoagulated with Eliquis and on NSAIDs for back pain and arthritis.     She is doing overall well today with no cardiovascular complaints on careful questioning outside of chronic, unchanged lower extremity edema.    She continues to be quite concerned about the combination of anticoagulation and nonsteroidal anti-inflammatory drugs appropriately.  She had previously seen a rheumatologist, but does not have access one down in [place], [place] where she lives.    Device interrogation shows atrial fibrillation with overall good rate control today.  She has no side effects with her medications including metoprolol and digoxin.  Blood pressure was a bit low in clinic today, and she has not been regularly taking that recently.    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   • Hypertension    • Paroxysmal atrial fibrillation (HCC)    • Sepsis (HCC) 5/5/2018     Past Surgical History:   Procedure Laterality Date   • PACEMAKER INSERTION Left 05/07/2018    [facility] MRI 2272 implanted by Dr. Jones.     Family History   Problem Relation Age of Onset   • Arthritis Mother    • Cancer Mother    • Hypertension Mother    • Hypertension Father    • Heart Disease Father    • Hyperlipidemia Father    • Lung Disease Sister    • Hypertension Sister      Social History     Social History   • Marital status: Unknown     Spouse name: N/A   • Number of children: N/A   • Years of  education: N/A     Occupational History   • Not on file.     Social History Main Topics   • Smoking status: Former Smoker     Packs/day: 0.50     Types: Cigarettes     Quit date: 1/1/1989   • Smokeless tobacco: Never Used   • Alcohol use Yes      Comment: occasional glass of wine   • Drug use: No   • Sexual activity: Not on file     Other Topics Concern   • Not on file     Social History Narrative   • No narrative on file     Allergies   Allergen Reactions   • Amiodarone      Atrial fibrillation..organ prob's   • Sulfa Drugs      Rash, chills, fever   • Augmentin Nausea   • Ciprofloxacin      Pt states it just didn't agree with her.     Outpatient Encounter Prescriptions as of 10/15/2018   Medication Sig Dispense Refill   • furosemide (LASIX) 20 MG Tab Take 20 mg by mouth 2 times a day.     • apixaban (ELIQUIS) 5mg Tab Take 1 Tab by mouth 2 Times a Day. 180 Tab 3   • metoprolol (LOPRESSOR) 100 MG Tab Take 1 Tab by mouth 2 Times a Day. 60 Tab 2   • Ferrous Sulfate (IRON) 325 (65 Fe) MG Tab Take  by mouth.     • Multiple Vitamin (MULTI-VITAMIN DAILY PO) Take  by mouth.     • digoxin (LANOXIN) 125 MCG Tab Take 1 Tab by mouth every day. 35 Tab 3   • spironolactone (ALDACTONE) 25 MG Tab Take 0.5 Tabs by mouth every day. 30 Tab 3   • omeprazole (PRILOSEC) 20 MG delayed-release capsule Take 20 mg by mouth every day.     • ipratropium (ATROVENT) 0.06 % Solution Spray 1 Spray in nose every day.     • hydrocodone-acetaminophen (NORCO) 7.5-325 MG per tablet Take 1-2 Tabs by mouth every four hours as needed for Severe Pain.     • [DISCONTINUED] furosemide (LASIX) 40 MG Tab Take 0.5 Tabs by mouth every day. 30 Tab    • Zolpidem Tartrate (AMBIEN PO) Take 2.5 mg by mouth.     • fluticasone (FLONASE) 50 MCG/ACT nasal spray Spray 1 Spray in nose every day.     • meloxicam (MOBIC) 7.5 MG TABS Take 7.5 mg by mouth every day.       No facility-administered encounter medications on file as of 10/15/2018.      Review of Systems  "  Constitutional: Positive for malaise/fatigue.   Respiratory: Positive for sputum production.    Cardiovascular: Positive for palpitations and leg swelling.   Musculoskeletal: Positive for back pain, joint pain and neck pain.   Endo/Heme/Allergies: Positive for environmental allergies. Bruises/bleeds easily.   Psychiatric/Behavioral: The patient has insomnia.    All other systems reviewed and are negative.       Objective:   /60 (BP Location: Left arm, Patient Position: Sitting, BP Cuff Size: Adult)   Pulse 75   Ht 1.626 m (5' 4\")   Wt 63.5 kg (140 lb)   SpO2 95%   BMI 24.03 kg/m²     Physical Exam   Constitutional: She is oriented to person, place, and time. She appears well-developed and well-nourished. No distress.   Very pleasant elderly woman accompanied by her daughter in no distress   HENT:   No conjunctival pallor   Eyes: Pupils are equal, round, and reactive to light. EOM are normal. No scleral icterus.   Neck: No JVD present.   Cardiovascular: Normal rate, normal heart sounds and intact distal pulses.  An irregularly irregular rhythm present. Exam reveals no gallop and no friction rub.    No murmur heard.  Pulmonary/Chest: Effort normal and breath sounds normal. No respiratory distress. She has no wheezes. She has no rales.   Abdominal: Soft. Bowel sounds are normal. She exhibits no distension.   Musculoskeletal: She exhibits edema (-1+ bilateral lower extremity edema).   Normal erythema in the palmar creases   Neurological: She is alert and oriented to person, place, and time.   Skin: Skin is warm and dry. No rash noted. She is not diaphoretic. No erythema. No pallor.   Psychiatric: She has a normal mood and affect. Judgment and thought content normal.   Vitals reviewed.    Lab Results   Component Value Date/Time    WBC 6.5 05/10/2018 06:04 AM    RBC 3.90 (L) 05/10/2018 06:04 AM    HEMOGLOBIN 12.0 05/10/2018 06:04 AM    HEMATOCRIT 35.3 (L) 05/10/2018 06:04 AM    MCV 90.5 05/10/2018 06:04 AM    " "MCH 30.8 05/10/2018 06:04 AM    MCHC 34.0 05/10/2018 06:04 AM    MPV 8.9 (L) 05/10/2018 06:04 AM        Lab Results   Component Value Date/Time    SODIUM 130 (L) 05/10/2018 03:26 AM    POTASSIUM 5.0 05/10/2018 03:26 AM    CHLORIDE 99 05/10/2018 03:26 AM    CO2 22 05/10/2018 03:26 AM    GLUCOSE 103 (H) 05/10/2018 03:26 AM    BUN 28 (H) 05/10/2018 03:26 AM    CREATININE 0.98 05/10/2018 03:26 AM        Lab Results   Component Value Date/Time    ASTSGOT 16 05/07/2018 02:27 AM    ALTSGPT 14 05/07/2018 02:27 AM        Lab Results   Component Value Date/Time    CHOLSTRLTOT 110 12/13/2016 01:00 AM    LDL 63 12/13/2016 01:00 AM    HDL 37 (A) 12/13/2016 01:00 AM    TRIGLYCERIDE 49 12/13/2016 01:00 AM         No results found for this or any previous visit.    Echocardiogram, 12/14/2016:  \"CONCLUSIONS  Mildly depressed left ventricular ejection fraction, EF 45%.  No thrombus detected in the left atrial appendage\"    Assessment:     1. Sick sinus syndrome (HCC)     2. Paroxysmal atrial fibrillation (HCC)  EC-ECHOCARDIOGRAM COMPLETE W/O CONT    EC-ECHOCARDIOGRAM COMPLETE W/O CONT    CBC WITH DIFFERENTIAL    BASIC METABOLIC PANEL    DIGOXIN   3. Primary osteoarthritis of both hands  REFERRAL TO RHEUMATOLOGY   4. Chronic anticoagulation     5. S/P placement of cardiac pacemaker         Medical Decision Making:  Today's Assessment / Status / Plan:     She is doing well from a cardiovascular perspective with no real complaints today.  Her blood pressure was a bit on the low side at 102/60 mmHg, and I did ask her to carefully track that once per day and send us a log again in 1 month.    I discussed again with her the combination of anticoagulation and nonsteroidal anti-inflammatory drugs.  I would generally attempts to limit her exposure to NSAIDs, and I do feel that she absolutely requires stroke prevention with Eliquis.  I observed no sequelae of anemia on physical examination, and ordered repeat labs including a CBC, chemistry " panel, and a digoxin level to be drawn in December.    Finally, I have not seen a recent echocardiogram and I did order that test to be done at our [facility] today.    Rubén Balbuena MD  Cardiologist, [facility] [place]     Return in about 6 months (around 4/15/2019).

## 2018-10-15 NOTE — LETTER
Name:          Jeny Bennett   YOB: 1933  Date:     10/15/2018      Cee Ramos M.D.  [address] [facility] [address]     Rubén Balbuena MD  [address] [place] [address] [address]  [place] [address]  Phone: [phone]  Back Line: [phone]  Fax: [phone]  E-mail: [email or web address]   Dear Dr. Ramos,    We had the pleasure of seeing your patient, Jeny Bennett, in Cardiology Clinic at [facility] today.    As you know, she is an 85-year-old woman with a history of paroxysmal atrial fibrillation, and more recently diagnosed sick sinus syndrome with pacemaker implantation on 5/7/2018 ([facility]). She had previously attempted amiodarone and sotalol for her sick sinus syndrome and failed those therapies.  She has been chronically anticoagulated with Eliquis and on NSAIDs for back pain and arthritis.    She is doing well from a cardiovascular perspective with no real complaints today.  Her blood pressure was a bit on the low side at 102/60 mmHg, and I did ask her to carefully track that once per day and send us a log again in 1 month.    I discussed again with her the combination of anticoagulation and nonsteroidal anti-inflammatory drugs.  I would generally attempts to limit her exposure to NSAIDs, and I do feel that she absolutely requires stroke prevention with Eliquis.  I observed no sequelae of anemia on physical examination, and ordered repeat labs including a CBC, chemistry panel, and a digoxin level to be drawn in December.    Finally, I have not seen a recent echocardiogram and I did order that test to be done at our [facility] today.    Return in about 6 months (around 4/15/2019).    Thank you for the referral and please do not hesitate to contact me at any time. My contact information is listed above.    This note was dictated using Dragon speech recognition software.     A full note including my physical examination and a full list of rectified medications  is available in our medical record, and can be faxed as well.    Rubén Balbuena MD  Cardiologist  [facility]

## 2018-10-15 NOTE — PROGRESS NOTES
Device is working normally. Underlying rhythm is chronic atrial fibrillation (known); she is anticoagulated. Normal sensing of RA and RV leads; stable capture of RV lead; stable impedances. Battery longevity is 8.4-10.4 years.  No changes are made today.    FU in 6 months for next PM check with me.    Collaborating MD: MANUELA Balbuena

## 2018-10-29 DIAGNOSIS — I48.0 PAROXYSMAL ATRIAL FIBRILLATION (HCC): ICD-10-CM

## 2018-10-30 RX ORDER — DIGOXIN 125 MCG
125 TABLET ORAL DAILY
Qty: 90 TAB | Refills: 3 | Status: SHIPPED | OUTPATIENT
Start: 2018-10-30 | End: 2019-10-16 | Stop reason: SDUPTHER

## 2018-12-03 DIAGNOSIS — I48.0 PAROXYSMAL ATRIAL FIBRILLATION (HCC): ICD-10-CM

## 2018-12-18 DIAGNOSIS — I48.0 PAROXYSMAL ATRIAL FIBRILLATION (HCC): ICD-10-CM

## 2018-12-18 RX ORDER — METOPROLOL TARTRATE 100 MG/1
100 TABLET ORAL 2 TIMES DAILY
Qty: 180 TAB | Refills: 3 | Status: SHIPPED | OUTPATIENT
Start: 2018-12-18 | End: 2020-11-20

## 2019-04-10 ENCOUNTER — TELEPHONE (OUTPATIENT)
Dept: CARDIOLOGY | Facility: MEDICAL CENTER | Age: 84
End: 2019-04-10

## 2019-04-15 ENCOUNTER — NON-PROVIDER VISIT (OUTPATIENT)
Dept: CARDIOLOGY | Facility: MEDICAL CENTER | Age: 84
End: 2019-04-15
Payer: MEDICARE

## 2019-04-15 ENCOUNTER — OFFICE VISIT (OUTPATIENT)
Dept: CARDIOLOGY | Facility: MEDICAL CENTER | Age: 84
End: 2019-04-15
Payer: MEDICARE

## 2019-04-15 DIAGNOSIS — I44.30 AV BLOCK: ICD-10-CM

## 2019-04-15 DIAGNOSIS — I48.0 PAROXYSMAL ATRIAL FIBRILLATION (HCC): Primary | ICD-10-CM

## 2019-04-15 DIAGNOSIS — Z95.0 S/P PLACEMENT OF CARDIAC PACEMAKER: ICD-10-CM

## 2019-04-15 DIAGNOSIS — I48.0 PAROXYSMAL ATRIAL FIBRILLATION (HCC): ICD-10-CM

## 2019-04-15 DIAGNOSIS — G47.33 OSA (OBSTRUCTIVE SLEEP APNEA): ICD-10-CM

## 2019-04-15 DIAGNOSIS — Z79.01 CHRONIC ANTICOAGULATION: ICD-10-CM

## 2019-04-15 PROCEDURE — 99214 OFFICE O/P EST MOD 30 MIN: CPT | Performed by: INTERNAL MEDICINE

## 2019-04-15 PROCEDURE — 93280 PM DEVICE PROGR EVAL DUAL: CPT | Performed by: NURSE PRACTITIONER

## 2019-04-15 RX ORDER — PANTOPRAZOLE SODIUM 20 MG/1
20 TABLET, DELAYED RELEASE ORAL DAILY
COMMUNITY
Start: 2019-03-21

## 2019-04-15 RX ORDER — ALLOPURINOL 300 MG/1
300 TABLET ORAL DAILY
COMMUNITY
Start: 2019-03-15

## 2019-04-15 RX ORDER — FUROSEMIDE 40 MG/1
TABLET ORAL
COMMUNITY
Start: 2019-02-01 | End: 2019-10-29

## 2019-04-15 ASSESSMENT — ENCOUNTER SYMPTOMS
BACK PAIN: 1
NECK PAIN: 1
BRUISES/BLEEDS EASILY: 1
INSOMNIA: 1
SPUTUM PRODUCTION: 1

## 2019-04-15 NOTE — PROGRESS NOTES
Device is working normally. Underlying rhythm is persistent atrial fibrillation (known); she is anticoagulated with Eliquis.  Normal sensing of RA and RV leads; stable capture of RV lead; stable impedances. Battery longevity is 8-10 years.  No changes are made today.    FU in 6 months for next PM check with me.    She does see Dr. MANUELA Balbuena today too.    Collaborating MD: MANUELA Balbuena

## 2019-04-15 NOTE — PROGRESS NOTES
No chief complaint on file.      Subjective:   Jeny Bennett is an 85 -year-old woman with a history of paroxysmal atrial fibrillation, and more recently diagnosed sick sinus syndrome with pacemaker implantation on 5/7/2018 ([facility]). She had previously attempted amiodarone and sotalol for her sick sinus syndrome and failed those therapies.  She has been chronically anticoagulated with Eliquis and on NSAIDs for back pain and arthritis.     She continues to do quite well, and has no cardiovascular complaints nor significant side effects with her medications.  She does tell me about very minimal epistaxis that she has on Eliquis that she notes was previously much worse with Coumadin.    She tells me that in conjunction with her borderline blood pressure today her home blood pressures are typically around 120 mmHg systolic.    She brings in some labs for review.    Device interrogation today is essentially normal, she is chronically in atrial fibrillation, and frequently ventricularly paced.    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   • Hypertension    • Paroxysmal atrial fibrillation (HCC)    • Sepsis (HCC) 5/5/2018     Past Surgical History:   Procedure Laterality Date   • PACEMAKER INSERTION Left 05/07/2018    [facility] MRI 2272 implanted by Dr. Jones.     Family History   Problem Relation Age of Onset   • Arthritis Mother    • Cancer Mother    • Hypertension Mother    • Hypertension Father    • Heart Disease Father    • Hyperlipidemia Father    • Lung Disease Sister    • Hypertension Sister      Social History     Social History   • Marital status: Unknown     Spouse name: N/A   • Number of children: N/A   • Years of education: N/A     Occupational History   • Not on file.     Social History Main Topics   • Smoking status: Former Smoker     Packs/day:  0.50     Types: Cigarettes     Quit date: 1/1/1989   • Smokeless tobacco: Never Used   • Alcohol use Yes      Comment: occasional glass of wine   • Drug use: No   • Sexual activity: Not on file     Other Topics Concern   • Not on file     Social History Narrative   • No narrative on file     Allergies   Allergen Reactions   • Amiodarone      Atrial fibrillation..organ prob's   • Sulfa Drugs      Rash, chills, fever   • Augmentin Nausea   • Ciprofloxacin      Pt states it just didn't agree with her.     Outpatient Encounter Prescriptions as of 4/15/2019   Medication Sig Dispense Refill   • pantoprazole (PROTONIX) 20 MG tablet      • allopurinol (ZYLOPRIM) 300 MG Tab      • furosemide (LASIX) 40 MG Tab      • metoprolol (LOPRESSOR) 100 MG Tab Take 1 Tab by mouth 2 Times a Day. 180 Tab 3   • digoxin (LANOXIN) 125 MCG Tab Take 1 Tab by mouth every day. 90 Tab 3   • furosemide (LASIX) 20 MG Tab Take 20 mg by mouth 2 times a day.     • apixaban (ELIQUIS) 5mg Tab Take 1 Tab by mouth 2 Times a Day. 180 Tab 3   • Zolpidem Tartrate (AMBIEN PO) Take 2.5 mg by mouth.     • Ferrous Sulfate (IRON) 325 (65 Fe) MG Tab Take  by mouth.     • Multiple Vitamin (MULTI-VITAMIN DAILY PO) Take  by mouth.     • spironolactone (ALDACTONE) 25 MG Tab Take 0.5 Tabs by mouth every day. 30 Tab 3   • omeprazole (PRILOSEC) 20 MG delayed-release capsule Take 20 mg by mouth every day.     • fluticasone (FLONASE) 50 MCG/ACT nasal spray Spray 1 Spray in nose every day.     • ipratropium (ATROVENT) 0.06 % Solution Spray 1 Spray in nose every day.     • meloxicam (MOBIC) 7.5 MG TABS Take 7.5 mg by mouth every day.     • hydrocodone-acetaminophen (NORCO) 7.5-325 MG per tablet Take 1-2 Tabs by mouth every four hours as needed for Severe Pain.       No facility-administered encounter medications on file as of 4/15/2019.      Review of Systems   Constitutional: Positive for malaise/fatigue.   Respiratory: Positive for sputum production.    Cardiovascular:  Positive for leg swelling (Improved, chronic).   Musculoskeletal: Positive for back pain, joint pain and neck pain.   Endo/Heme/Allergies: Positive for environmental allergies. Bruises/bleeds easily.   Psychiatric/Behavioral: The patient has insomnia.    All other systems reviewed and are negative.       Objective:   There were no vitals taken for this visit.    Physical Exam   Constitutional: She is oriented to person, place, and time. She appears well-developed and well-nourished. No distress.   Very pleasant elderly woman accompanied by her daughter in no distress.  Physical examination is unchanged compared to my previous on 10/15/2018 except where specified.   HENT:   No conjunctival pallor   Eyes: Pupils are equal, round, and reactive to light. EOM are normal. No scleral icterus.   Neck: No JVD present.   Cardiovascular: Normal rate, normal heart sounds and intact distal pulses.  An irregularly irregular rhythm present. Exam reveals no gallop and no friction rub.    No murmur heard.  Pulmonary/Chest: Effort normal and breath sounds normal. No respiratory distress. She has no wheezes. She has no rales.   Abdominal: Soft. Bowel sounds are normal. She exhibits no distension.   Musculoskeletal: She exhibits edema (-1+ bilateral lower extremity edema).   Normal erythema in the palmar creases   Neurological: She is alert and oriented to person, place, and time.   Skin: Skin is warm and dry. No rash noted. She is not diaphoretic. No erythema. No pallor.   Psychiatric: She has a normal mood and affect. Judgment and thought content normal.   Vitals reviewed.    Lab Results   Component Value Date/Time    WBC 6.5 05/10/2018 06:04 AM    RBC 3.90 (L) 05/10/2018 06:04 AM    HEMOGLOBIN 12.0 05/10/2018 06:04 AM    HEMATOCRIT 35.3 (L) 05/10/2018 06:04 AM    MCV 90.5 05/10/2018 06:04 AM    MCH 30.8 05/10/2018 06:04 AM    MCHC 34.0 05/10/2018 06:04 AM    MPV 8.9 (L) 05/10/2018 06:04 AM        Lab Results   Component Value  "Date/Time    SODIUM 130 (L) 05/10/2018 03:26 AM    POTASSIUM 5.0 05/10/2018 03:26 AM    CHLORIDE 99 05/10/2018 03:26 AM    CO2 22 05/10/2018 03:26 AM    GLUCOSE 103 (H) 05/10/2018 03:26 AM    BUN 28 (H) 05/10/2018 03:26 AM    CREATININE 0.98 05/10/2018 03:26 AM        Lab Results   Component Value Date/Time    ASTSGOT 16 05/07/2018 02:27 AM    ALTSGPT 14 05/07/2018 02:27 AM        Lab Results   Component Value Date/Time    CHOLSTRLTOT 110 12/13/2016 01:00 AM    LDL 63 12/13/2016 01:00 AM    HDL 37 (A) 12/13/2016 01:00 AM    TRIGLYCERIDE 49 12/13/2016 01:00 AM         No results found for this or any previous visit.    Labs, 7/5/2018  Chemistry panel: Sodium 137, potassium 4.2, chloride 100, CO2 24, BUN 19, creatinine 0.98, glucose 115, calcium 9.0    Chemistry panel, 3/9/2019 11 sodium 140, potassium 4.2, grade 1 or 2, CO2 25, BUN 23, creatinine 1.0, glucose 111, calcium 9.4    Echocardiogram, 12/14/2016:  \"CONCLUSIONS  Mildly depressed left ventricular ejection fraction, EF 45%.  No thrombus detected in the left atrial appendage\"    Echocardiogram, 10/15/2018:  \"CONCLUSIONS  Normal left ventricular systolic function.  Mild concentric left ventricular hypertrophy.  Moderately dilated left atrium.  Mild mitral regurgitation.  Aortic sclerosis without stenosis.  Mild (borderline moderate) aortic insufficiency.  Moderate tricuspid regurgitation.  Right ventricular systolic pressure is estimated to be 47 mmHg + JVP.  Compared to the images of the study done 12/12/2016 - there has been a   mild increase in estimated right sided pressures and no other significant change.\"    Assessment:     1. Paroxysmal atrial fibrillation (HCC)     2. Chronic anticoagulation     3. S/P placement of cardiac pacemaker     4. DELIA (obstructive sleep apnea)  REFERRAL TO SLEEP STUDIES       Medical Decision Making:  Today's Assessment / Status / Plan:     She has no cardiovascular complaints today, and is stable on Eliquis for stroke " prevention rate controlled in atrial fibrillation on digoxin and metoprolol.  She is euvolemic on Lasix at 20 mg p.o. twice daily.    She does tell me about significant daytime fatigue, and I noted her elevated estimated right-sided pressures on her last echocardiogram 6 months ago.  In relation to both of those, I encouraged her to follow-up for a sleep study and made a referral to sleep clinic.    Otherwise, I made no changes to her cardiovascular regimen nor ordered additional testing at this time.    Rubén Balbuena MD  Cardiologist, [facility] [place]     Return in about 6 months (around 10/15/2019).

## 2019-04-15 NOTE — LETTER
Name:          Jeny Bennett   YOB: 1933  Date:     04/15/2019      Cee Ramos M.D.  [address] [facility] [address]     Rubén Balbuena MD  [address] [place] [address] [address]  [place] [address]  Phone: [phone]  Back Line: [phone]  Fax: [phone]  E-mail: [email or web address]   Dear Dr. Ramos,    We had the pleasure of seeing your patient, Jeny Bennett, in Cardiology Clinic at [facility] today.    As you know, she is an 85-year-old woman with a history of paroxysmal atrial fibrillation, and more recently diagnosed sick sinus syndrome with pacemaker implantation on 5/7/2018 ([facility]). She had previously attempted amiodarone and sotalol for her sick sinus syndrome and failed those therapies.  She has been chronically anticoagulated with Eliquis and on NSAIDs for back pain and arthritis.    She has no cardiovascular complaints today, and is stable on Eliquis for stroke prevention rate controlled in atrial fibrillation on digoxin and metoprolol.  She is euvolemic on Lasix at 20 mg p.o. twice daily.     She does tell me about significant daytime fatigue, and I noted her elevated estimated right-sided pressures on her last echocardiogram 6 months ago.  In relation to both of those, I encouraged her to follow-up for a sleep study and made a referral to sleep clinic.     Otherwise, I made no changes to her cardiovascular regimen nor ordered additional testing at this time.    Return in about 6 months (around 10/15/2019).    Thank you for the referral and please do not hesitate to contact me at any time. My contact information is listed above.    This note was dictated using Dragon speech recognition software.     A full note including my physical examination and a full list of rectified medications is available in our medical record, and can be faxed as well.    Rubén Balbuena MD  Cardiologist  [facility]

## 2019-10-02 ENCOUNTER — TELEPHONE (OUTPATIENT)
Dept: CARDIOLOGY | Facility: MEDICAL CENTER | Age: 84
End: 2019-10-02

## 2019-10-02 NOTE — TELEPHONE ENCOUNTER
Called pt, she has not seen a dentist yet, she's unsure if she needs filling or crown replacement, she would like to know the procedure if in case she will be scheduled for one, in regards to his blood thinner and pacemaker. Advise pt that typically dental office will fax a clearance request to our office once they've seen her and evaluated her. Pt appreciative of information and verbalizes understanding, provided her office fax number so her dental office could fax clearance.

## 2019-10-15 ENCOUNTER — TELEPHONE (OUTPATIENT)
Dept: CARDIOLOGY | Facility: MEDICAL CENTER | Age: 84
End: 2019-10-15

## 2019-10-15 NOTE — TELEPHONE ENCOUNTER
Called and left message on 10/15/2019 for patient to remind her that she has an upcoming appointment with Dr. Oro on 10/29/2019 at 4:00PM and she needs to do her labs before her appointment.

## 2019-10-16 DIAGNOSIS — I48.0 PAROXYSMAL ATRIAL FIBRILLATION (HCC): ICD-10-CM

## 2019-10-18 RX ORDER — DIGOXIN 125 MCG
TABLET ORAL
Qty: 90 TAB | Refills: 2 | Status: SHIPPED | OUTPATIENT
Start: 2019-10-18 | End: 2020-07-23 | Stop reason: SDUPTHER

## 2019-10-22 ENCOUNTER — TELEPHONE (OUTPATIENT)
Dept: CARDIOLOGY | Facility: MEDICAL CENTER | Age: 84
End: 2019-10-22

## 2019-10-22 NOTE — TELEPHONE ENCOUNTER
Called and left message on 10/22/2019 for patient to remind her that she has an upcoming appointment with Dr. Oro on 10/29/2019 at 4:00PM and she needs to do her labs before her appointment.

## 2019-10-29 ENCOUNTER — OFFICE VISIT (OUTPATIENT)
Dept: CARDIOLOGY | Facility: MEDICAL CENTER | Age: 84
End: 2019-10-29
Payer: MEDICARE

## 2019-10-29 ENCOUNTER — NON-PROVIDER VISIT (OUTPATIENT)
Dept: CARDIOLOGY | Facility: MEDICAL CENTER | Age: 84
End: 2019-10-29
Payer: MEDICARE

## 2019-10-29 VITALS
DIASTOLIC BLOOD PRESSURE: 78 MMHG | HEIGHT: 64 IN | OXYGEN SATURATION: 95 % | HEART RATE: 76 BPM | BODY MASS INDEX: 23.22 KG/M2 | SYSTOLIC BLOOD PRESSURE: 118 MMHG | WEIGHT: 136 LBS

## 2019-10-29 DIAGNOSIS — I44.30 AV BLOCK: ICD-10-CM

## 2019-10-29 DIAGNOSIS — M19.041 PRIMARY OSTEOARTHRITIS OF BOTH HANDS: ICD-10-CM

## 2019-10-29 DIAGNOSIS — I49.5 SICK SINUS SYNDROME (HCC): ICD-10-CM

## 2019-10-29 DIAGNOSIS — Z95.0 S/P PLACEMENT OF CARDIAC PACEMAKER: ICD-10-CM

## 2019-10-29 DIAGNOSIS — Z79.01 CHRONIC ANTICOAGULATION: ICD-10-CM

## 2019-10-29 DIAGNOSIS — Z79.899 HIGH RISK MEDICATION USE: ICD-10-CM

## 2019-10-29 DIAGNOSIS — I10 ESSENTIAL HYPERTENSION: ICD-10-CM

## 2019-10-29 DIAGNOSIS — I50.32 CHRONIC DIASTOLIC CONGESTIVE HEART FAILURE (HCC): ICD-10-CM

## 2019-10-29 DIAGNOSIS — M19.042 PRIMARY OSTEOARTHRITIS OF BOTH HANDS: ICD-10-CM

## 2019-10-29 DIAGNOSIS — G47.33 OSA (OBSTRUCTIVE SLEEP APNEA): ICD-10-CM

## 2019-10-29 DIAGNOSIS — I48.0 PAROXYSMAL ATRIAL FIBRILLATION (HCC): ICD-10-CM

## 2019-10-29 DIAGNOSIS — R60.0 LOCALIZED EDEMA: ICD-10-CM

## 2019-10-29 PROCEDURE — 93279 PRGRMG DEV EVAL PM/LDLS PM: CPT | Performed by: INTERNAL MEDICINE

## 2019-10-29 PROCEDURE — 99214 OFFICE O/P EST MOD 30 MIN: CPT | Performed by: INTERNAL MEDICINE

## 2019-10-29 RX ORDER — COLCHICINE 0.6 MG/1
0.6 TABLET, FILM COATED ORAL DAILY
COMMUNITY
Start: 2019-09-17 | End: 2020-08-05

## 2019-10-29 NOTE — PATIENT INSTRUCTIONS
-Keep your weight under 140 pounds, if it reaches 140 pounds, take an extra 20 mg of furosemide that day, make sure it comes back down.  -Questions/concerns about your medications, call [phone]

## 2019-10-29 NOTE — LETTER
[facility]   [address] , [address]  [place] [address]  Phone: [phone]  Fax: [phone]              Jeny Bennett  5/19/1933    Encounter Date: 10/29/2019    Didi Oro M.D.          PROGRESS NOTE:  Subjective:   Chief Complaint:   Chief Complaint   Patient presents with   • Atrial Fibrillation       Jeny Bennett is a 86 y.o. female who returns today for permanent atrial fibrillation, sick sinus syndrome with pacemaker placement, [employer], 5/7/2018.      First noted PAF around 2005, was symptomatic.  She was in [place] at the time, multiple attempts different medications.  Was on amio (had abnormal LFTs on amio) and sotalol, discussion of PM but not done. Moved here in 2011.  Had previously been on amiodarone.  Now in chronic atrial fibrillation with intermittent ventricular pacing, V paced 48%.    Now on digoxin, metoprolol.  Has some diastolic dysfunction, on lasix and spironolactone.  Weights 136 without, about 2 pound fluctuation.    Chronic anticoagulation for chads 2 vascular score of at least 5. No prior TIA/CVA.  On apixaban, no major bleeding.    Has hypertension, blood pressure controlled    Last LDL cholesterol was 63, 2016.    Has moderate TR, moderate pulm HTN.    She is not limited by chest pain, pressure or tightness with activity.   No significant dyspnea on exertion, orthopnea or lower extremity swelling.   Has mild LE edema.  No significant palpitations, dizziness, or presyncope/syncope.   No stroke/TIA like symptoms.  She does have fatigue, takes some naps.    Takes colchicine/allo for gout.  Had been on mexolicam but having bleeding, on PPI and Iron.  Did have prior GI bleeding in [facility], had bleeding ulcer, colitis, Barretts, GERD.    Here with her daughter Kathryn.  They live in [place].    DATA REVIEWED by me:  ECG 5/8/2018  Atrial fibrillation, intermittent ventricular pacing    Pacemaker interrogation 10/29/2019   atrial  fibrillation, V pacing 48%    Pacemaker interrogation 4/15/2019  Atrial fibrillation, complete heart block, V paced 44%, atrial paced 1%     Echocardiogram, 10/15/2018:  Normal left ventricular systolic function. EF 60%.  Mild concentric left ventricular hypertrophy.  Moderately dilated left atrium.  Mild mitral regurgitation.  Aortic sclerosis without stenosis.  Mild (borderline moderate) aortic insufficiency.  Moderate tricuspid regurgitation.  Right ventricular systolic pressure is estimated to be 47 mmHg + JVP.  Compared to the images of the study done 12/12/2016 - there has been a   mild increase in estimated right sided pressures and no other significant change.    Echocardiogram, 12/14/2016:  Mildly depressed left ventricular ejection fraction, EF 45%.  No thrombus detected in the left atrial appendage    EP 5-2018  [facility] PM    Most recent labs:     5/10/2018 hemoglobin 12, 180, sodium 130, potassium 5, creatinine 0.98    5/7/2018 LFTs normal    12/30/2016 LDL 63, HDL 37, triglycerides 49, total cholesterol 110    Past Medical History:   Diagnosis Date   • Arthritis    • Back pain    • Chronic anticoagulation    • Fall 12-   • Heart block, AV 05/2018    Status post pacemaker placement.   • Hypertension    • Paroxysmal atrial fibrillation (HCC)    • Sepsis (HCC) 5/5/2018     Past Surgical History:   Procedure Laterality Date   • PACEMAKER INSERTION Left 05/07/2018    [facility] [employer] MRI 2272 implanted by Dr. Jones.     Family History   Problem Relation Age of Onset   • Arthritis Mother    • Cancer Mother    • Hypertension Mother    • Hypertension Father    • Heart Disease Father    • Hyperlipidemia Father    • Lung Disease Sister    • Hypertension Sister      Social History     Socioeconomic History   • Marital status: Unknown     Spouse name: Not on file   • Number of children: Not on file   • Years of education: Not on file   • Highest education level: Not on file   Occupational History     • Not on file   Social Needs   • Financial resource strain: Not on file   • Food insecurity:     Worry: Not on file     Inability: Not on file   • Transportation needs:     Medical: Not on file     Non-medical: Not on file   Tobacco Use   • Smoking status: Former Smoker     Packs/day: 0.50     Types: Cigarettes     Last attempt to quit: 1989     Years since quittin.8   • Smokeless tobacco: Never Used   Substance and Sexual Activity   • Alcohol use: Yes     Comment: occasional glass of wine   • Drug use: No   • Sexual activity: Not on file   Lifestyle   • Physical activity:     Days per week: Not on file     Minutes per session: Not on file   • Stress: Not on file   Relationships   • Social connections:     Talks on phone: Not on file     Gets together: Not on file     Attends Buddhist service: Not on file     Active member of club or organization: Not on file     Attends meetings of clubs or organizations: Not on file     Relationship status: Not on file   • Intimate partner violence:     Fear of current or ex partner: Not on file     Emotionally abused: Not on file     Physically abused: Not on file     Forced sexual activity: Not on file   Other Topics Concern   • Not on file   Social History Narrative   • Not on file     Allergies   Allergen Reactions   • Amiodarone      Atrial fibrillation..organ prob's   • Sulfa Drugs      Rash, chills, fever   • Augmentin Nausea   • Ciprofloxacin      Pt states it just didn't agree with her.       Current Outpatient Medications   Medication Sig Dispense Refill   • COLCRYS 0.6 MG Tab      • digoxin (LANOXIN) 125 MCG Tab TAKE 1 TABLET DAILY. 90 Tab 2   • pantoprazole (PROTONIX) 20 MG tablet      • allopurinol (ZYLOPRIM) 300 MG Tab      • metoprolol (LOPRESSOR) 100 MG Tab Take 1 Tab by mouth 2 Times a Day. 180 Tab 3   • furosemide (LASIX) 20 MG Tab Take 20 mg by mouth every day.     • apixaban (ELIQUIS) 5mg Tab Take 1 Tab by mouth 2 Times a Day. 180 Tab 3   • Zolpidem  "Tartrate (AMBIEN PO) Take 2.5 mg by mouth.     • Ferrous Sulfate (IRON) 325 (65 Fe) MG Tab Take  by mouth.     • Multiple Vitamin (MULTI-VITAMIN DAILY PO) Take  by mouth.     • spironolactone (ALDACTONE) 25 MG Tab Take 0.5 Tabs by mouth every day. 30 Tab 3   • fluticasone (FLONASE) 50 MCG/ACT nasal spray Spray 1 Spray in nose every day.     • ipratropium (ATROVENT) 0.06 % Solution Spray 1 Spray in nose every day.     • meloxicam (MOBIC) 7.5 MG TABS Take 7.5 mg by mouth every day.     • hydrocodone-acetaminophen (NORCO) 7.5-325 MG per tablet Take 1-2 Tabs by mouth every four hours as needed for Severe Pain.       No current facility-administered medications for this visit.        ROS  All others systems reviewed and negative.     Objective:     /78 (BP Location: Right arm, Patient Position: Sitting, BP Cuff Size: Adult)   Pulse 76   Ht 1.626 m (5' 4\")   Wt 61.7 kg (136 lb)   SpO2 95%  Body mass index is 23.34 kg/m².    Physical Exam   General: No acute distress. Well nourished.  HEENT: EOM grossly intact, no scleral icterus, no pharyngeal erythema.   Neck:  No JVD, no bruits, trachea midline  CVS: irreg irreg Normal S1, S2. No M/R/G. No LE edema.  2+ radial pulses, 2+ PT pulses, pacemaker pocket intact.  Resp: CTAB. No wheezing or crackles/rhonchi. Normal respiratory effort.  Abdomen: Soft, NT, no angelic hepatomegaly.  MSK/Ext: No clubbing or cyanosis.  Skin: Warm and dry, no rashes.  Neurological: CN III-XII grossly intact. No focal deficits. Using rolling walker.  Psych: A&O x 3, appropriate affect, good judgement      Assessment:     1. Paroxysmal atrial fibrillation (HCC)     2. S/P placement of cardiac pacemaker     3. AV block     4. Chronic anticoagulation     5. DELIA (obstructive sleep apnea)     6. Sick sinus syndrome (HCC)     7. Primary osteoarthritis of both hands     8. Localized edema     9. Essential hypertension     10. High risk medication use     11. Chronic diastolic congestive heart " failure (HCC)         Medical Decision Making:  Today's Assessment / Status / Plan:     -Overall she is doing very well.  -Pacing intermittently 44%  -Permanent afib  -Tolerating blood thinner for [id number] of 5, no prior TIA/CVA, ok to stop as needed for procedures  -Cont metopr and dig  -We did discuss digoxin and considered stopping it but I think she needs the HR control  -Cont 6 month PM and MD visits, they prefer  even though they come to   -MD 6 months then yearly if no change.    Written instructions given today:    -Keep your weight under 140 pounds, if it reaches 140 pounds, take an extra 20 mg of furosemide that day, make sure it comes back down.  -Questions/concerns about your medications, call [phone]    Return in about 6 months (around 4/29/2020), or Ashley Castaneda in  or [place].    It is my pleasure to participate in the care of Ms. Bennett.  Please do not hesitate to contact me with questions or concerns.    Didi Oro MD, [facility]  Cardiologist [facility]    Please note that this dictation was created using voice recognition software. I have made every reasonable attempt to correct obvious errors, but it is possible there are errors of grammar and possibly content that I did not discover before finalizing the note.      Cee Ramos M.D.  [address]    [place] [address]  VIA Facsimile: [phone]

## 2019-10-29 NOTE — PROGRESS NOTES
Subjective:   Chief Complaint:   Chief Complaint   Patient presents with   • Atrial Fibrillation       Jeny Bennett is a 86 y.o. female who returns today for permanent atrial fibrillation, sick sinus syndrome with pacemaker placement, [employer], 5/7/2018.      First noted PAF around 2005, was symptomatic.  [place] was [place] at the time, multiple attempts different medications.  Was on amio (had abnormal LFTs on amio) and sotalol, discussion of PM but not done. Moved here in 2011.  Had previously been on amiodarone.  Now in chronic atrial fibrillation with intermittent ventricular pacing, V paced 48%.    Now on digoxin, metoprolol.  Has some diastolic dysfunction, on lasix and spironolactone.  Weights 136 without, about 2 pound fluctuation.    Chronic anticoagulation for chads 2 vascular score of at least 5. No prior TIA/CVA.  On apixaban, no major bleeding.    Has hypertension, blood pressure controlled    Last LDL cholesterol was 63, 2016.    Has moderate TR, moderate pulm HTN.    She is not limited by chest pain, pressure or tightness with activity.   No significant dyspnea on exertion, orthopnea or lower extremity swelling.   Has mild LE edema.  No significant palpitations, dizziness, or presyncope/syncope.   No stroke/TIA like symptoms.  She does have fatigue, takes some naps.    Takes colchicine/allo for gout.  Had been on mexolicam but having bleeding, on PPI and Iron.  Did have prior GI bleeding in [facility], had bleeding ulcer, colitis, Barretts, GERD.    Here with her daughter Kathryn.  They live in [place].    DATA REVIEWED by me:  ECG 5/8/2018  Atrial fibrillation, intermittent ventricular pacing    Pacemaker interrogation 10/29/2019   atrial fibrillation, V pacing 48%    Pacemaker interrogation 4/15/2019  Atrial fibrillation, complete heart block, V paced 44%, atrial paced 1%     Echocardiogram, 10/15/2018:  Normal left ventricular systolic function. EF 60%.  Mild concentric left ventricular  hypertrophy.  Moderately dilated left atrium.  Mild mitral regurgitation.  Aortic sclerosis without stenosis.  Mild (borderline moderate) aortic insufficiency.  Moderate tricuspid regurgitation.  Right ventricular systolic pressure is estimated to be 47 mmHg + JVP.  Compared to the images of the study done 12/12/2016 - there has been a   mild increase in estimated right sided pressures and no other significant change.    Echocardiogram, 12/14/2016:  Mildly depressed left ventricular ejection fraction, EF 45%.  No thrombus detected in the left atrial appendage    EP 5-2018  [facility] PM    Most recent labs:     5/10/2018 hemoglobin 12, 180, sodium 130, potassium 5, creatinine 0.98    5/7/2018 LFTs normal    12/30/2016 LDL 63, HDL 37, triglycerides 49, total cholesterol 110    Past Medical History:   Diagnosis Date   • Arthritis    • Back pain    • Chronic anticoagulation    • Fall 12-   • Heart block, AV 05/2018    Status post pacemaker placement.   • Hypertension    • Paroxysmal atrial fibrillation (HCC)    • Sepsis (HCC) 5/5/2018     Past Surgical History:   Procedure Laterality Date   • PACEMAKER INSERTION Left 05/07/2018    [facility] [employer] MRI 2272 implanted by Dr. Jones.     Family History   Problem Relation Age of Onset   • Arthritis Mother    • Cancer Mother    • Hypertension Mother    • Hypertension Father    • Heart Disease Father    • Hyperlipidemia Father    • Lung Disease Sister    • Hypertension Sister      Social History     Socioeconomic History   • Marital status: Unknown     Spouse name: Not on file   • Number of children: Not on file   • Years of education: Not on file   • Highest education level: Not on file   Occupational History   • Not on file   Social Needs   • Financial resource strain: Not on file   • Food insecurity:     Worry: Not on file     Inability: Not on file   • Transportation needs:     Medical: Not on file     Non-medical: Not on file   Tobacco Use   • Smoking  status: Former Smoker     Packs/day: 0.50     Types: Cigarettes     Last attempt to quit: 1989     Years since quittin.8   • Smokeless tobacco: Never Used   Substance and Sexual Activity   • Alcohol use: Yes     Comment: occasional glass of wine   • Drug use: No   • Sexual activity: Not on file   Lifestyle   • Physical activity:     Days per week: Not on file     Minutes per session: Not on file   • Stress: Not on file   Relationships   • Social connections:     Talks on phone: Not on file     Gets together: Not on file     Attends Church service: Not on file     Active member of club or organization: Not on file     Attends meetings of clubs or organizations: Not on file     Relationship status: Not on file   • Intimate partner violence:     Fear of current or ex partner: Not on file     Emotionally abused: Not on file     Physically abused: Not on file     Forced sexual activity: Not on file   Other Topics Concern   • Not on file   Social History Narrative   • Not on file     Allergies   Allergen Reactions   • Amiodarone      Atrial fibrillation..organ prob's   • Sulfa Drugs      Rash, chills, fever   • Augmentin Nausea   • Ciprofloxacin      Pt states it just didn't agree with her.       Current Outpatient Medications   Medication Sig Dispense Refill   • COLCRYS 0.6 MG Tab      • digoxin (LANOXIN) 125 MCG Tab TAKE 1 TABLET DAILY. 90 Tab 2   • pantoprazole (PROTONIX) 20 MG tablet      • allopurinol (ZYLOPRIM) 300 MG Tab      • metoprolol (LOPRESSOR) 100 MG Tab Take 1 Tab by mouth 2 Times a Day. 180 Tab 3   • furosemide (LASIX) 20 MG Tab Take 20 mg by mouth every day.     • apixaban (ELIQUIS) 5mg Tab Take 1 Tab by mouth 2 Times a Day. 180 Tab 3   • Zolpidem Tartrate (AMBIEN PO) Take 2.5 mg by mouth.     • Ferrous Sulfate (IRON) 325 (65 Fe) MG Tab Take  by mouth.     • Multiple Vitamin (MULTI-VITAMIN DAILY PO) Take  by mouth.     • spironolactone (ALDACTONE) 25 MG Tab Take 0.5 Tabs by mouth every day. 30  "Tab 3   • fluticasone (FLONASE) 50 MCG/ACT nasal spray Spray 1 Spray in nose every day.     • ipratropium (ATROVENT) 0.06 % Solution Spray 1 Spray in nose every day.     • meloxicam (MOBIC) 7.5 MG TABS Take 7.5 mg by mouth every day.     • hydrocodone-acetaminophen (NORCO) 7.5-325 MG per tablet Take 1-2 Tabs by mouth every four hours as needed for Severe Pain.       No current facility-administered medications for this visit.        ROS  All others systems reviewed and negative.     Objective:     /78 (BP Location: Right arm, Patient Position: Sitting, BP Cuff Size: Adult)   Pulse 76   Ht 1.626 m (5' 4\")   Wt 61.7 kg (136 lb)   SpO2 95%  Body mass index is 23.34 kg/m².    Physical Exam   General: No acute distress. Well nourished.  HEENT: EOM grossly intact, no scleral icterus, no pharyngeal erythema.   Neck:  No JVD, no bruits, trachea midline  CVS: irreg irreg Normal S1, S2. No M/R/G. No LE edema.  2+ radial pulses, 2+ PT pulses, pacemaker pocket intact.  Resp: CTAB. No wheezing or crackles/rhonchi. Normal respiratory effort.  Abdomen: Soft, NT, no angelic hepatomegaly.  MSK/Ext: No clubbing or cyanosis.  Skin: Warm and dry, no rashes.  Neurological: CN III-XII grossly intact. No focal deficits. Using rolling walker.  Psych: A&O x 3, appropriate affect, good judgement      Assessment:     1. Paroxysmal atrial fibrillation (HCC)     2. S/P placement of cardiac pacemaker     3. AV block     4. Chronic anticoagulation     5. DELIA (obstructive sleep apnea)     6. Sick sinus syndrome (HCC)     7. Primary osteoarthritis of both hands     8. Localized edema     9. Essential hypertension     10. High risk medication use     11. Chronic diastolic congestive heart failure (HCC)         Medical Decision Making:  Today's Assessment / Status / Plan:     -Overall she is doing very well.  -Pacing intermittently 44%  -Permanent afib  -Tolerating blood thinner for [id number] of 5, no prior TIA/CVA, ok to stop as needed for " procedures  -Cont metopr and dig  -We did discuss digoxin and considered stopping it but I think she needs the HR control  -Cont 6 month PM and MD visits, they prefer  even though they come to [facility] 6 months then yearly if no change.    Written instructions given today:    -Keep your weight under 140 pounds, if it reaches 140 pounds, take an extra 20 mg of furosemide that day, make sure it comes back down.  -Questions/concerns about your medications, call [phone]    Return in about 6 months (around 4/29/2020), or Ashley Castaneda in  or [place]any.    It is my pleasure to participate in the care of Ms. Bennett.  Please do not hesitate to contact me with questions or concerns.    Didi Oro MD, [facility]  Cardiologist [facility] for [facility]    Please note that this dictation was created using voice recognition software. I have made every reasonable attempt to correct obvious errors, but it is possible there are errors of grammar and possibly content that I did not discover before finalizing the note.

## 2020-04-03 ENCOUNTER — TELEPHONE (OUTPATIENT)
Dept: CARDIOLOGY | Facility: MEDICAL CENTER | Age: 85
End: 2020-04-03

## 2020-04-03 NOTE — TELEPHONE ENCOUNTER
DEVICE CLIN    Patient is concerned that this appt 4/8/2020 for a PMC and was wanted to know if this is important and needs to come in for. Please call the PT back at [phone].    Thank you,  Ashley MCLEAN

## 2020-04-03 NOTE — TELEPHONE ENCOUNTER
I called her / AB and cancelled the appt for pacer check.  I also cancelled with LS for 4/8-- she says she is feeling ok and a June appt would be fine.  She is getting a Merlin sent to her for pacer checks.   Message sent to schedulers.

## 2020-04-08 ENCOUNTER — APPOINTMENT (OUTPATIENT)
Dept: CARDIOLOGY | Facility: PHYSICIAN GROUP | Age: 85
End: 2020-04-08
Payer: MEDICARE

## 2020-05-29 ENCOUNTER — TELEPHONE (OUTPATIENT)
Dept: CARDIOLOGY | Facility: MEDICAL CENTER | Age: 85
End: 2020-05-29

## 2020-05-29 NOTE — TELEPHONE ENCOUNTER
Called pt to set up remote monitor, pt needs help from daughter to get it plugged in. I gave her my direct line to call me back when her daughter is there to help her and we will get everything set up and send in a remote transmission.

## 2020-07-13 NOTE — TELEPHONE ENCOUNTER
Successfully walked pt and daughter through set-up and sending a transmission with remote monitor. Explained how monitor works and how 90 day remote monitoring appointments work. Pt was appreciative of assistance.

## 2020-07-23 DIAGNOSIS — I48.0 PAROXYSMAL ATRIAL FIBRILLATION (HCC): ICD-10-CM

## 2020-07-23 RX ORDER — DIGOXIN 125 MCG
125 TABLET ORAL DAILY
Qty: 90 TAB | Refills: 1 | Status: SHIPPED | OUTPATIENT
Start: 2020-07-23 | End: 2020-11-20

## 2020-08-05 ENCOUNTER — OFFICE VISIT (OUTPATIENT)
Dept: CARDIOLOGY | Facility: PHYSICIAN GROUP | Age: 85
End: 2020-08-05
Payer: MEDICARE

## 2020-08-05 ENCOUNTER — NON-PROVIDER VISIT (OUTPATIENT)
Dept: CARDIOLOGY | Facility: PHYSICIAN GROUP | Age: 85
End: 2020-08-05
Payer: MEDICARE

## 2020-08-05 VITALS
HEART RATE: 74 BPM | SYSTOLIC BLOOD PRESSURE: 110 MMHG | BODY MASS INDEX: 23.22 KG/M2 | WEIGHT: 136 LBS | DIASTOLIC BLOOD PRESSURE: 70 MMHG | HEIGHT: 64 IN | OXYGEN SATURATION: 91 %

## 2020-08-05 VITALS
HEIGHT: 64 IN | BODY MASS INDEX: 23.22 KG/M2 | HEART RATE: 74 BPM | OXYGEN SATURATION: 91 % | WEIGHT: 136 LBS | DIASTOLIC BLOOD PRESSURE: 70 MMHG | SYSTOLIC BLOOD PRESSURE: 110 MMHG

## 2020-08-05 DIAGNOSIS — Z79.01 CHRONIC ANTICOAGULATION: ICD-10-CM

## 2020-08-05 DIAGNOSIS — I44.30 AV BLOCK: ICD-10-CM

## 2020-08-05 DIAGNOSIS — Z95.0 S/P PLACEMENT OF CARDIAC PACEMAKER: ICD-10-CM

## 2020-08-05 DIAGNOSIS — I50.32 CHRONIC DIASTOLIC CONGESTIVE HEART FAILURE (HCC): ICD-10-CM

## 2020-08-05 DIAGNOSIS — G47.33 OSA (OBSTRUCTIVE SLEEP APNEA): ICD-10-CM

## 2020-08-05 DIAGNOSIS — R60.0 LOCALIZED EDEMA: ICD-10-CM

## 2020-08-05 DIAGNOSIS — I48.0 PAROXYSMAL ATRIAL FIBRILLATION (HCC): ICD-10-CM

## 2020-08-05 DIAGNOSIS — I49.5 SICK SINUS SYNDROME (HCC): ICD-10-CM

## 2020-08-05 DIAGNOSIS — I10 ESSENTIAL HYPERTENSION: ICD-10-CM

## 2020-08-05 DIAGNOSIS — Z79.899 HIGH RISK MEDICATION USE: ICD-10-CM

## 2020-08-05 PROCEDURE — 99214 OFFICE O/P EST MOD 30 MIN: CPT | Performed by: INTERNAL MEDICINE

## 2020-08-05 PROCEDURE — 93280 PM DEVICE PROGR EVAL DUAL: CPT | Performed by: NURSE PRACTITIONER

## 2020-08-05 NOTE — PROGRESS NOTES
Device is working normally. Mode switching episodes 100% of the time (known); she is anticoagulated.  Normal sensing of RA and RV leads; stable capture of RV leads; stable impedances. Battery longevity is 8.6-10.6 years.  No changes are made today.    I did confirm that her Merlin home monitor is indeed connected and transmitting properly. She can be seen in our office annually.    She does see Dr. Oro today too.    FU in 12 months for next PM check with me.    Collaborating MD: Shorty

## 2020-08-05 NOTE — PATIENT INSTRUCTIONS
-Reduce your Eliquis from 5 mg to 2.5 mg AM and PM, you can cut your 5 mg tablets in half until they are gone.  I am sending a new prescription.

## 2020-08-05 NOTE — PROGRESS NOTES
Subjective:   Chief Complaint:   Chief Complaint   Patient presents with   • Atrial Fibrillation     Jeny Bennett is a 87 y.o. female who returns today for permanent atrial fibrillation, sick sinus syndrome with pacemaker placement, [employer], 5/7/2018.      First noted PAF around 2005, was symptomatic.  She was in [place] at the time, multiple attempts different medications.  Was on amio (had abnormal LFTs on amio) and sotalol, discussion of PM but not done. Moved here in 2011.  Had previously been on amiodarone.  Now in chronic atrial fibrillation with intermittent ventricular pacing, V paced 48%.  Her weight is down near 60 kg, her age is over 80, kidney function is ok.    Now on digoxin, metoprolol.  Has some diastolic dysfunction, on lasix and spironolactone.  Weights were 136 without, about 2 pound fluctuation.  Now approaching 132 pounds.    Chronic anticoagulation for chads 2 vascular score of at least 5. No prior TIA/CVA.  On apixaban, no major bleeding.    Has hypertension, blood pressure controlled    Last LDL cholesterol was 63, 2016.    Has moderate TR, moderate pulm HTN.    She uses a rolling walker.  She is not limited by chest pain, pressure or tightness with activity.   Very mild dyspnea on exertion but not bothersome, no orthopnea.  Has mild LE edema, looks like venous insufficiency.  No significant palpitations, dizziness, or presyncope/syncope.   No stroke/TIA like symptoms.  She does have fatigue, takes some naps.    Takes colchicine/allo for gout.  Had been on mexolicam but having bleeding, on PPI and Iron.  Did have prior GI bleeding in [facility], had bleeding ulcer, colitis, Barretts, GERD.    Here with her daughter Kathryn.  They live in [place].    DATA REVIEWED by me:  ECG 5/8/2018  Atrial fibrillation, intermittent ventricular pacing    PM 8-5-2020  V paced 54%, A 1% mode switches    Pacemaker interrogation 10/29/2019   atrial fibrillation, V pacing 48%    Pacemaker interrogation  4/15/2019  Atrial fibrillation, complete heart block, V paced 44%, atrial paced 1%     Echocardiogram, 10/15/2018:  Normal left ventricular systolic function. EF 60%.  Mild concentric left ventricular hypertrophy.  Moderately dilated left atrium.  Mild mitral regurgitation.  Aortic sclerosis without stenosis.  Mild (borderline moderate) aortic insufficiency.  Moderate tricuspid regurgitation.  Right ventricular systolic pressure is estimated to be 47 mmHg + JVP.  Compared to the images of the study done 12/12/2016 - there has been a   mild increase in estimated right sided pressures and no other significant change.    Echocardiogram, 12/14/2016:  Mildly depressed left ventricular ejection fraction, EF 45%.  No thrombus detected in the left atrial appendage    EP 5-2018  [facility] PM    Most recent labs:     Lab Results   Component Value Date/Time    HEMOGLOBIN 12.0 05/10/2018 06:04 AM    HEMATOCRIT 35.3 (L) 05/10/2018 06:04 AM    MCV 90.5 05/10/2018 06:04 AM    INR 1.07 05/05/2018 05:30 AM      Lab Results   Component Value Date/Time    SODIUM 130 (L) 05/10/2018 03:26 AM    POTASSIUM 5.0 05/10/2018 03:26 AM    CHLORIDE 99 05/10/2018 03:26 AM    CO2 22 05/10/2018 03:26 AM    GLUCOSE 103 (H) 05/10/2018 03:26 AM    BUN 28 (H) 05/10/2018 03:26 AM    CREATININE 0.98 05/10/2018 03:26 AM      Lab Results   Component Value Date/Time    ASTSGOT 16 05/07/2018 02:27 AM    ALTSGPT 14 05/07/2018 02:27 AM    ALBUMIN 3.6 05/10/2018 03:26 AM      Lab Results   Component Value Date/Time    CHOLSTRLTOT 110 12/13/2016 01:00 AM    LDL 63 12/13/2016 01:00 AM    HDL 37 (A) 12/13/2016 01:00 AM    TRIGLYCERIDE 49 12/13/2016 01:00 AM       5/10/2018 hemoglobin 12, 180, sodium 130, potassium 5, creatinine 0.98    5/7/2018 LFTs normal    12/30/2016 LDL 63, HDL 37, triglycerides 49, total cholesterol 110    Past Medical History:   Diagnosis Date   • Arthritis    • Back pain    • Chronic anticoagulation    • Fall 12-   • Heart block,  AV 2018    Status post pacemaker placement.   • Hypertension    • Paroxysmal atrial fibrillation (HCC)    • Sepsis (HCC) 2018     Past Surgical History:   Procedure Laterality Date   • PACEMAKER INSERTION Left 2018    [facility] [employer] MRI 2272 implanted by Dr. Jones.     Family History   Problem Relation Age of Onset   • Arthritis Mother    • Cancer Mother    • Hypertension Mother    • Hypertension Father    • Heart Disease Father    • Hyperlipidemia Father    • Lung Disease Sister    • Hypertension Sister      Social History     Socioeconomic History   • Marital status: Unknown     Spouse name: Not on file   • Number of children: Not on file   • Years of education: Not on file   • Highest education level: Not on file   Occupational History   • Not on file   Social Needs   • Financial resource strain: Not on file   • Food insecurity     Worry: Not on file     Inability: Not on file   • Transportation needs     Medical: Not on file     Non-medical: Not on file   Tobacco Use   • Smoking status: Former Smoker     Packs/day: 0.50     Types: Cigarettes     Quit date: 1989     Years since quittin.6   • Smokeless tobacco: Never Used   Substance and Sexual Activity   • Alcohol use: Yes     Comment: occasional glass of wine   • Drug use: No   • Sexual activity: Not on file   Lifestyle   • Physical activity     Days per week: Not on file     Minutes per session: Not on file   • Stress: Not on file   Relationships   • Social connections     Talks on phone: Not on file     Gets together: Not on file     Attends Synagogue service: Not on file     Active member of club or organization: Not on file     Attends meetings of clubs or organizations: Not on file     Relationship status: Not on file   • Intimate partner violence     Fear of current or ex partner: Not on file     Emotionally abused: Not on file     Physically abused: Not on file     Forced sexual activity: Not on file   Other Topics Concern  "  • Not on file   Social History Narrative   • Not on file     Allergies   Allergen Reactions   • Amiodarone      Atrial fibrillation..organ prob's   • Sulfa Drugs      Rash, chills, fever   • Augmentin Nausea   • Ciprofloxacin      Pt states it just didn't agree with her.       Current Outpatient Medications   Medication Sig Dispense Refill   • apixaban (ELIQUIS) 2.5mg Tab Take 1 Tab by mouth 2 Times a Day. 180 Tab 3   • digoxin (LANOXIN) 125 MCG Tab Take 1 Tab by mouth every day. 90 Tab 1   • pantoprazole (PROTONIX) 20 MG tablet Take 20 mg by mouth every day.     • allopurinol (ZYLOPRIM) 300 MG Tab Take 300 mg by mouth every day.     • metoprolol (LOPRESSOR) 100 MG Tab Take 1 Tab by mouth 2 Times a Day. 180 Tab 3   • furosemide (LASIX) 20 MG Tab Take 20 mg by mouth every day.     • Zolpidem Tartrate (AMBIEN PO) Take 2.5 mg by mouth.     • Ferrous Sulfate (IRON) 325 (65 Fe) MG Tab Take  by mouth.     • Multiple Vitamin (MULTI-VITAMIN DAILY PO) Take  by mouth.     • spironolactone (ALDACTONE) 25 MG Tab Take 0.5 Tabs by mouth every day. 30 Tab 3   • hydrocodone-acetaminophen (NORCO) 7.5-325 MG per tablet Take 1-2 Tabs by mouth every four hours as needed for Severe Pain.       No current facility-administered medications for this visit.        ROS  All others systems reviewed and negative.     Objective:     /70 (BP Location: Left arm, Patient Position: Sitting)   Pulse 74   Ht 1.626 m (5' 4\")   Wt 61.7 kg (136 lb)   SpO2 91%  Body mass index is 23.34 kg/m².    Physical Exam   General: No acute distress. Well nourished.  HEENT: EOM grossly intact, no scleral icterus, no pharyngeal erythema.   Neck:  No JVD, no bruits, trachea midline  CVS: irreg irreg Normal S1, S2. No M/R/G. No LE edema.  2+ radial pulses, 2+ PT pulses, pacemaker pocket intact.  Resp: CTAB. No wheezing or crackles/rhonchi. Normal respiratory effort.  Abdomen: Soft, NT, no angelic hepatomegaly.  MSK/Ext: No clubbing or cyanosis.  Skin: Warm " and dry, no rashes.  Neurological: CN III-XII grossly intact. No focal deficits. Using rolling walker.  Psych: A&O x 3, appropriate affect, good judgement    Physical exam performed today and unchanged, except what is noted, compared to 10-29-19    Assessment:     1. Paroxysmal atrial fibrillation (HCC)     2. S/P placement of cardiac pacemaker     3. AV block     4. Chronic anticoagulation     5. DELIA (obstructive sleep apnea)     6. Sick sinus syndrome (HCC)     7. Localized edema     8. Essential hypertension     9. High risk medication use     10. Chronic diastolic congestive heart failure (HCC)         Medical Decision Making:  Today's Assessment / Status / Plan:     -Overall she is doing very well.  -Pacing intermittently 54%  -Permanent afib  -Tolerating blood thinner for [id number] of 5, no prior TIA/CVA, ok to stop as needed for procedures  -Her weight is down near 60 kg, her age is over 80, kidney function is ok but I think the better part of valor is to reduce the dose for weight and age so this was done today.  -Cont metopr and dig  -We did discuss digoxin and considered stopping it but I think she needs the HR control  -She has a home device and only needs PM check yearly, MD yearly as well.    Written instructions given today:    -Reduce your Eliquis from 5 mg to 2.5 mg AM and PM, you can cut your 5 mg tablets in half until they are gone.  I am sending a new prescription.    Return in about 1 year (around 8/5/2021).    It is my pleasure to participate in the care of Ms. Bennett.  Please do not hesitate to contact me with questions or concerns.    Didi Oro MD, [facility]  Cardiologist [facility]    Please note that this dictation was created using voice recognition software. I have made every reasonable attempt to correct obvious errors, but it is possible there are errors of grammar and possibly content that I did not discover before finalizing the note.

## 2020-08-05 NOTE — LETTER
[facility] [place] [address]  Phone: [phone]  Fax: [phone]              Jeny Bennett  5/19/1933    Encounter Date: 8/5/2020    Didi Oro M.D.          PROGRESS NOTE:  Subjective:   Chief Complaint:   Chief Complaint   Patient presents with   • Atrial Fibrillation     Jeny Bennett is a 87 y.o. female who returns today for permanent atrial fibrillation, sick sinus syndrome with pacemaker placement, [employer], 5/7/2018.      First noted PAF around 2005, was symptomatic.  [place] was in [place] at the time, multiple attempts different medications.  Was on amio (had abnormal LFTs on amio) and sotalol, discussion of PM but not done. Moved here in 2011.  Had previously been on amiodarone.  Now in chronic atrial fibrillation with intermittent ventricular pacing, V paced 48%.  Her weight is down near 60 kg, her age is over 80, kidney function is ok.    Now on digoxin, metoprolol.  Has some diastolic dysfunction, on lasix and spironolactone.  Weights were 136 without, about 2 pound fluctuation.  Now approaching 132 pounds.    Chronic anticoagulation for chads 2 vascular score of at least 5. No prior TIA/CVA.  On apixaban, no major bleeding.    Has hypertension, blood pressure controlled    Last LDL cholesterol was 63, 2016.    Has moderate TR, moderate pulm HTN.    She uses a rolling walker.  She is not limited by chest pain, pressure or tightness with activity.   Very mild dyspnea on exertion but not bothersome, no orthopnea.  Has mild LE edema, looks like venous insufficiency.  No significant palpitations, dizziness, or presyncope/syncope.   No stroke/TIA like symptoms.  She does have fatigue, takes some naps.    Takes colchicine/allo for gout.  Had been on mexolicam but having bleeding, on PPI and Iron.  Did have prior GI bleeding in [place], had bleeding ulcer, colitis, Barretts, GERD.    Here with her daughter  Kathryn.  They live in [place].    DATA REVIEWED by me:  ECG 5/8/2018  Atrial fibrillation, intermittent ventricular pacing    PM 8-5-2020  V paced 54%, A 1% mode switches    Pacemaker interrogation 10/29/2019   atrial fibrillation, V pacing 48%    Pacemaker interrogation 4/15/2019  Atrial fibrillation, complete heart block, V paced 44%, atrial paced 1%     Echocardiogram, 10/15/2018:  Normal left ventricular systolic function. EF 60%.  Mild concentric left ventricular hypertrophy.  Moderately dilated left atrium.  Mild mitral regurgitation.  Aortic sclerosis without stenosis.  Mild (borderline moderate) aortic insufficiency.  Moderate tricuspid regurgitation.  Right ventricular systolic pressure is estimated to be 47 mmHg + JVP.  Compared to the images of the study done 12/12/2016 - there has been a   mild increase in estimated right sided pressures and no other significant change.    Echocardiogram, 12/14/2016:  Mildly depressed left ventricular ejection fraction, EF 45%.  No thrombus detected in the left atrial appendage    EP 5-2018  [facility] PM    Most recent labs:     Lab Results   Component Value Date/Time    HEMOGLOBIN 12.0 05/10/2018 06:04 AM    HEMATOCRIT 35.3 (L) 05/10/2018 06:04 AM    MCV 90.5 05/10/2018 06:04 AM    INR 1.07 05/05/2018 05:30 AM      Lab Results   Component Value Date/Time    SODIUM 130 (L) 05/10/2018 03:26 AM    POTASSIUM 5.0 05/10/2018 03:26 AM    CHLORIDE 99 05/10/2018 03:26 AM    CO2 22 05/10/2018 03:26 AM    GLUCOSE 103 (H) 05/10/2018 03:26 AM    BUN 28 (H) 05/10/2018 03:26 AM    CREATININE 0.98 05/10/2018 03:26 AM      Lab Results   Component Value Date/Time    ASTSGOT 16 05/07/2018 02:27 AM    ALTSGPT 14 05/07/2018 02:27 AM    ALBUMIN 3.6 05/10/2018 03:26 AM      Lab Results   Component Value Date/Time    CHOLSTRLTOT 110 12/13/2016 01:00 AM    LDL 63 12/13/2016 01:00 AM    HDL 37 (A) 12/13/2016 01:00 AM    TRIGLYCERIDE 49 12/13/2016 01:00 AM       5/10/2018 hemoglobin 12, 180, sodium  130, potassium 5, creatinine 0.98    2018 LFTs normal    2016 LDL 63, HDL 37, triglycerides 49, total cholesterol 110    Past Medical History:   Diagnosis Date   • Arthritis    • Back pain    • Chronic anticoagulation    • Fall 2015   • Heart block, AV 2018    Status post pacemaker placement.   • Hypertension    • Paroxysmal atrial fibrillation (HCC)    • Sepsis (HCC) 2018     Past Surgical History:   Procedure Laterality Date   • PACEMAKER INSERTION Left 2018    [facility] [facility] [employer] MRI 2272 implanted by Dr. Jones.     Family History   Problem Relation Age of Onset   • Arthritis Mother    • Cancer Mother    • Hypertension Mother    • Hypertension Father    • Heart Disease Father    • Hyperlipidemia Father    • Lung Disease Sister    • Hypertension Sister      Social History     Socioeconomic History   • Marital status: Unknown     Spouse name: Not on file   • Number of children: Not on file   • Years of education: Not on file   • Highest education level: Not on file   Occupational History   • Not on file   Social Needs   • Financial resource strain: Not on file   • Food insecurity     Worry: Not on file     Inability: Not on file   • Transportation needs     Medical: Not on file     Non-medical: Not on file   Tobacco Use   • Smoking status: Former Smoker     Packs/day: 0.50     Types: Cigarettes     Quit date: 1989     Years since quittin.6   • Smokeless tobacco: Never Used   Substance and Sexual Activity   • Alcohol use: Yes     Comment: occasional glass of wine   • Drug use: No   • Sexual activity: Not on file   Lifestyle   • Physical activity     Days per week: Not on file     Minutes per session: Not on file   • Stress: Not on file   Relationships   • Social connections     Talks on phone: Not on file     Gets together: Not on file     Attends Scientologist service: Not on file     Active member of club or organization: Not on file     Attends meetings of clubs or  "organizations: Not on file     Relationship status: Not on file   • Intimate partner violence     Fear of current or ex partner: Not on file     Emotionally abused: Not on file     Physically abused: Not on file     Forced sexual activity: Not on file   Other Topics Concern   • Not on file   Social History Narrative   • Not on file     Allergies   Allergen Reactions   • Amiodarone      Atrial fibrillation..organ prob's   • Sulfa Drugs      Rash, chills, fever   • Augmentin Nausea   • Ciprofloxacin      Pt states it just didn't agree with her.       Current Outpatient Medications   Medication Sig Dispense Refill   • apixaban (ELIQUIS) 2.5mg Tab Take 1 Tab by mouth 2 Times a Day. 180 Tab 3   • digoxin (LANOXIN) 125 MCG Tab Take 1 Tab by mouth every day. 90 Tab 1   • pantoprazole (PROTONIX) 20 MG tablet Take 20 mg by mouth every day.     • allopurinol (ZYLOPRIM) 300 MG Tab Take 300 mg by mouth every day.     • metoprolol (LOPRESSOR) 100 MG Tab Take 1 Tab by mouth 2 Times a Day. 180 Tab 3   • furosemide (LASIX) 20 MG Tab Take 20 mg by mouth every day.     • Zolpidem Tartrate (AMBIEN PO) Take 2.5 mg by mouth.     • Ferrous Sulfate (IRON) 325 (65 Fe) MG Tab Take  by mouth.     • Multiple Vitamin (MULTI-VITAMIN DAILY PO) Take  by mouth.     • spironolactone (ALDACTONE) 25 MG Tab Take 0.5 Tabs by mouth every day. 30 Tab 3   • hydrocodone-acetaminophen (NORCO) 7.5-325 MG per tablet Take 1-2 Tabs by mouth every four hours as needed for Severe Pain.       No current facility-administered medications for this visit.        ROS  All others systems reviewed and negative.     Objective:     /70 (BP Location: Left arm, Patient Position: Sitting)   Pulse 74   Ht 1.626 m (5' 4\")   Wt 61.7 kg (136 lb)   SpO2 91%  Body mass index is 23.34 kg/m².    Physical Exam   General: No acute distress. Well nourished.  HEENT: EOM grossly intact, no scleral icterus, no pharyngeal erythema.   Neck:  No JVD, no bruits, trachea " midline  CVS: irreg irreg Normal S1, S2. No M/R/G. No LE edema.  2+ radial pulses, 2+ PT pulses, pacemaker pocket intact.  Resp: CTAB. No wheezing or crackles/rhonchi. Normal respiratory effort.  Abdomen: Soft, NT, no angelic hepatomegaly.  MSK/Ext: No clubbing or cyanosis.  Skin: Warm and dry, no rashes.  Neurological: CN III-XII grossly intact. No focal deficits. Using rolling walker.  Psych: A&O x 3, appropriate affect, good judgement    Physical exam performed today and unchanged, except what is noted, compared to 10-29-19    Assessment:     1. Paroxysmal atrial fibrillation (HCC)     2. S/P placement of cardiac pacemaker     3. AV block     4. Chronic anticoagulation     5. DELIA (obstructive sleep apnea)     6. Sick sinus syndrome (HCC)     7. Localized edema     8. Essential hypertension     9. High risk medication use     10. Chronic diastolic congestive heart failure (HCC)         Medical Decision Making:  Today's Assessment / Status / Plan:     -Overall she is doing very well.  -Pacing intermittently 54%  -Permanent afib  -Tolerating blood thinner for [id number] of 5, no prior TIA/CVA, ok to stop as needed for procedures  -Her weight is down near 60 kg, her age is over 80, kidney function is ok but I think the better part of valor is to reduce the dose for weight and age so this was done today.  -Cont metopr and dig  -We did discuss digoxin and considered stopping it but I think she needs the HR control  -She has a home device and only needs PM check yearly, MD yearly as well.    Written instructions given today:    -Reduce your Eliquis from 5 mg to 2.5 mg AM and PM, you can cut your 5 mg tablets in half until they are gone.  I am sending a new prescription.    Return in about 1 year (around 8/5/2021).    It is my pleasure to participate in the care of Ms. Bennett.  Please do not hesitate to contact me with questions or concerns.    Didi Oro MD, [facility]  Cardiologist [facility]    Please note that this dictation was created using voice recognition software. I have made every reasonable attempt to correct obvious errors, but it is possible there are errors of grammar and possibly content that I did not discover before finalizing the note.      Cee Ramos M.D.  [address] [place] [address]  Via Fax: [phone]

## 2020-11-04 ENCOUNTER — NON-PROVIDER VISIT (OUTPATIENT)
Dept: CARDIOLOGY | Facility: MEDICAL CENTER | Age: 85
End: 2020-11-04
Payer: MEDICARE

## 2020-11-04 DIAGNOSIS — I44.30 AV BLOCK: ICD-10-CM

## 2020-11-04 DIAGNOSIS — Z95.0 CARDIAC PACEMAKER IN SITU: ICD-10-CM

## 2020-11-04 PROCEDURE — 93294 REM INTERROG EVL PM/LDLS PM: CPT | Performed by: INTERNAL MEDICINE

## 2020-11-20 ENCOUNTER — TELEPHONE (OUTPATIENT)
Dept: CARDIOLOGY | Facility: MEDICAL CENTER | Age: 85
End: 2020-11-20

## 2020-11-20 DIAGNOSIS — I48.0 PAROXYSMAL ATRIAL FIBRILLATION (HCC): ICD-10-CM

## 2020-11-20 RX ORDER — METOPROLOL TARTRATE 100 MG/1
50 TABLET ORAL 2 TIMES DAILY
Qty: 180 TAB | Refills: 3 | COMMUNITY
Start: 2020-11-20 | End: 2021-06-02

## 2020-11-20 NOTE — TELEPHONE ENCOUNTER
Called pt and infromed her of recommendations, she is agreeable. She will monitor BP/HR and symptoms and update us.

## 2020-11-20 NOTE — TELEPHONE ENCOUNTER
Returned pt's call. She reports having low energy for approx. 5-6 months, with some lightheadedness, feeling wobbly/off balance, but no falls. She feels her BP has been low. She reports SBP around 111-113, occasionally DBP is in the 50's. I explained that SBP is within normal range, but if this is below her baseline BP it is possible to feel symptoms. HR high 50's-60. /67 on a day she had not taken dig or metoprolol yet. She is wondering if any of her meds need to be switched or if dosage needs to be changed.       To LS, any recommendations?

## 2020-11-20 NOTE — TELEPHONE ENCOUNTER
LS    Pt calling in regards to feeling low energy and her BP is low as well. Pt is wondering if her Rx should be readjusted or if she is being over medicated. Please call pt cell # [phone].    Thank you

## 2020-11-30 NOTE — TELEPHONE ENCOUNTER
TO: 1:30pm/Mon/Ofc  NM: Jeny Abrams   PH: [phone]   PT NM: Jeny Abrams   : 33   REG DR: Dr Oro   RE: Was a change in blood pressure  medication and was told to call if her  blood pressure elevated and it has,  would like a call back   DISP HIST: 2020 01:29P NR p/disp

## 2020-11-30 NOTE — TELEPHONE ENCOUNTER
Returned pt's call. She reports since making med changes on 11/20 BP/HR are higher than normal. Starting on 11/21 /65 HR 85 in morning and 141/82 at 6 pm HR 62, 11/22 115/67 HR 87 5 pm, 11/23 130/76 HR 76 4 pm, /63 HR 81. No other symptoms. Advised pt that w/ the exception of 11/21 readings, these numbers are within good range and she should continue current medication regimen and continue monitoring BP/HR. She will let us know if BP becomes consistently > 130/90 or if she has any cardiac symptoms.

## 2021-02-03 ENCOUNTER — NON-PROVIDER VISIT (OUTPATIENT)
Dept: CARDIOLOGY | Facility: MEDICAL CENTER | Age: 86
End: 2021-02-03
Payer: MEDICARE

## 2021-02-03 DIAGNOSIS — I49.5 SICK SINUS SYNDROME (HCC): ICD-10-CM

## 2021-02-03 DIAGNOSIS — Z95.0 S/P PLACEMENT OF CARDIAC PACEMAKER: ICD-10-CM

## 2021-02-03 PROCEDURE — 93294 REM INTERROG EVL PM/LDLS PM: CPT | Performed by: INTERNAL MEDICINE

## 2021-04-08 ENCOUNTER — TELEPHONE (OUTPATIENT)
Dept: CARDIOLOGY | Facility: MEDICAL CENTER | Age: 86
End: 2021-04-08

## 2021-04-08 NOTE — TELEPHONE ENCOUNTER
"Returned call. Pt states her HR has been in the 80-90's and it is usually 70-80's. She was told to call if her HR is >100 and a recent HR reading was 95 so she wanted to make sure this is ok. /75. Pt states she is asymptomatic now but does state that sometimes she feels a little shaky and like her \"head space is different\" but is not sure why.     Advised pt to continue monitoring BP/HR, decrease/avoid caffeine and aclohol intake, reduce stress and increase water to about 8- 8 ounce glasses of water a day. Pt states that she is not drinking that much and will try to increase to see if symptoms improve. Pt is unsure how to do a remote transmission, informed pt I will ask our device lady to call her and assist her with this. Advised pt to call if HR/BP becomes consistently elevated, or she develops new or worsening cardiac symptoms.       "

## 2021-04-08 NOTE — TELEPHONE ENCOUNTER
LS    Patient called regarding her change of medication. Her HR has been in the 80-90's and she does have a pacemaker. She wants to make sure all is ok. Please call her at [phone].    Thank you,    Sapna SCHOFIELD

## 2021-04-08 NOTE — TELEPHONE ENCOUNTER
Remote transmission 4/8/2021:    Pt presenting in persistent AF with normal ventricular rates (known).     1-HVR episode~188 bpm 4/7/2021@9:24am lasting 20 mins and 20 secs.  1-HVR episode~186 bpm 4/2/2021@10:24am lasting 31 mins and 16 secs.     Full report scanned into media.

## 2021-05-05 ENCOUNTER — NON-PROVIDER VISIT (OUTPATIENT)
Dept: CARDIOLOGY | Facility: MEDICAL CENTER | Age: 86
End: 2021-05-05
Payer: MEDICARE

## 2021-05-05 DIAGNOSIS — Z95.0 CARDIAC PACEMAKER IN SITU: ICD-10-CM

## 2021-05-05 DIAGNOSIS — I49.5 SICK SINUS SYNDROME (HCC): ICD-10-CM

## 2021-05-05 PROCEDURE — 93294 REM INTERROG EVL PM/LDLS PM: CPT | Performed by: INTERNAL MEDICINE

## 2021-06-02 ENCOUNTER — NON-PROVIDER VISIT (OUTPATIENT)
Dept: CARDIOLOGY | Facility: PHYSICIAN GROUP | Age: 86
End: 2021-06-02
Payer: MEDICARE

## 2021-06-02 ENCOUNTER — OFFICE VISIT (OUTPATIENT)
Dept: CARDIOLOGY | Facility: PHYSICIAN GROUP | Age: 86
End: 2021-06-02
Payer: MEDICARE

## 2021-06-02 VITALS
SYSTOLIC BLOOD PRESSURE: 132 MMHG | WEIGHT: 141.2 LBS | DIASTOLIC BLOOD PRESSURE: 74 MMHG | OXYGEN SATURATION: 95 % | DIASTOLIC BLOOD PRESSURE: 74 MMHG | OXYGEN SATURATION: 95 % | HEART RATE: 116 BPM | BODY MASS INDEX: 24.11 KG/M2 | HEIGHT: 64 IN | WEIGHT: 141 LBS | BODY MASS INDEX: 24.07 KG/M2 | HEART RATE: 84 BPM | SYSTOLIC BLOOD PRESSURE: 132 MMHG | HEIGHT: 64 IN

## 2021-06-02 DIAGNOSIS — I50.32 CHRONIC DIASTOLIC CONGESTIVE HEART FAILURE (HCC): ICD-10-CM

## 2021-06-02 DIAGNOSIS — Z79.01 CHRONIC ANTICOAGULATION: ICD-10-CM

## 2021-06-02 DIAGNOSIS — Z95.0 CARDIAC PACEMAKER IN SITU: ICD-10-CM

## 2021-06-02 DIAGNOSIS — G47.33 OSA (OBSTRUCTIVE SLEEP APNEA): ICD-10-CM

## 2021-06-02 DIAGNOSIS — I48.0 PAROXYSMAL ATRIAL FIBRILLATION (HCC): ICD-10-CM

## 2021-06-02 DIAGNOSIS — E87.6 HYPOKALEMIA: ICD-10-CM

## 2021-06-02 DIAGNOSIS — Z95.0 S/P PLACEMENT OF CARDIAC PACEMAKER: ICD-10-CM

## 2021-06-02 DIAGNOSIS — I10 ESSENTIAL HYPERTENSION: ICD-10-CM

## 2021-06-02 DIAGNOSIS — I48.11 LONGSTANDING PERSISTENT ATRIAL FIBRILLATION (HCC): ICD-10-CM

## 2021-06-02 DIAGNOSIS — I44.30 AV BLOCK: ICD-10-CM

## 2021-06-02 DIAGNOSIS — R60.0 LOCALIZED EDEMA: ICD-10-CM

## 2021-06-02 DIAGNOSIS — I49.5 SICK SINUS SYNDROME (HCC): ICD-10-CM

## 2021-06-02 PROCEDURE — 99214 OFFICE O/P EST MOD 30 MIN: CPT | Performed by: INTERNAL MEDICINE

## 2021-06-02 PROCEDURE — 93280 PM DEVICE PROGR EVAL DUAL: CPT | Performed by: NURSE PRACTITIONER

## 2021-06-02 RX ORDER — POTASSIUM CHLORIDE 750 MG/1
10 CAPSULE, EXTENDED RELEASE ORAL DAILY
Qty: 90 CAPSULE | Refills: 7 | Status: SHIPPED | OUTPATIENT
Start: 2021-06-02 | End: 2022-06-21 | Stop reason: SDUPTHER

## 2021-06-02 RX ORDER — METOPROLOL TARTRATE 50 MG/1
75 TABLET, FILM COATED ORAL 2 TIMES DAILY
Qty: 270 TABLET | Refills: 7 | Status: SHIPPED | OUTPATIENT
Start: 2021-06-02 | End: 2021-07-30 | Stop reason: SDUPTHER

## 2021-06-02 RX ORDER — DIGOXIN 125 MCG
125 TABLET ORAL DAILY
Qty: 90 TABLET | Refills: 7 | Status: SHIPPED | OUTPATIENT
Start: 2021-06-02 | End: 2022-06-21 | Stop reason: SDUPTHER

## 2021-06-02 NOTE — LETTER
[facility] [place] [address]  Phone: [phone]  Fax: [phone]              Jeny Bennett  5/19/1933    Encounter Date: 6/2/2021    Didi Oro M.D.          PROGRESS NOTE:  Subjective:   Chief Complaint:   Chief Complaint   Patient presents with   • Atrial Fibrillation   • Bradycardia     Jeny Bennett is a 88 y.o. female who returns today for longstanding persistent atrial fibrillation, sick sinus syndrome with pacemaker placement, [employer], 5/7/2018, lightheadedness, hypokalemia.      First noted PAF around 2005, was symptomatic.  She was in [place] at the time, multiple attempts different medications.  Was on amio (had abnormal LFTs on amio) and sotalol, discussion of PM but not done. Moved here in 2011.  Had previously been on amiodarone.  Was then chronic atrial fibrillation with intermittent ventricular pacing, V paced 48%.  Now she is in A. fib 39% of the time.    Was on digoxin, metoprolol.  Was not feeling well, we stopped dig and cut metoprolol in half.    Has some diastolic dysfunction, on lasix and spironolactone.  Weights were 136 without, about 2 pound fluctuation.  Previously was 132 pounds.  Weight now around 141 pounds.  Not associated with other symptoms of water weight gain.    Chronic anticoagulation for chads 2 vascular score of at least 5. No prior TIA/CVA.  On apixaban, no major bleeding.    Has hypertension, blood pressure controlled    Last LDL cholesterol was 127.    Has moderate TR, moderate pulm HTN.    Was feeling poorly, weakness, was told K was low, was given supplements for 1 month, was better but now out of pills.  Feels better after potassium was replaced.    She uses a rolling walker.  She is not limited by chest pain, pressure or tightness with activity.   Very mild dyspnea on exertion but not bothersome, no orthopnea.  Has mild LE edema, looks like venous  insufficiency.  No significant palpitations, dizziness, or presyncope/syncope.   No stroke/TIA like symptoms.  She does have fatigue, takes some naps.    Takes colchicine/allo for gout.  Had been on mexolicam but having bleeding, on PPI and Iron.  Did have prior GI bleeding in [place], had bleeding ulcer, colitis, [place], GERD.    Here with her daughter Kathryn.  They live in [place].    DATA REVIEWED by me:  ECG 5/8/2018  Atrial fibrillation, intermittent ventricular pacing    Pacemaker check 6-21  A paced 23%, V paced 39%, 31% mode switches    PM 8-5-2020  V paced 54%, A 1% mode switches    Pacemaker interrogation 10/29/2019   atrial fibrillation, V pacing 48%    Pacemaker interrogation 4/15/2019  Atrial fibrillation, complete heart block, V paced 44%, atrial paced 1%     Echocardiogram, 10/15/2018  Normal left ventricular systolic function. EF 60%.  Mild concentric left ventricular hypertrophy.  Moderately dilated left atrium.  Mild mitral regurgitation.  Aortic sclerosis without stenosis.  Mild (borderline moderate) aortic insufficiency.  Moderate tricuspid regurgitation.  Right ventricular systolic pressure is estimated to be 47 mmHg + JVP.  Compared to the images of the study done 12/12/2016 - there has been a   mild increase in estimated right sided pressures and no other significant change.    Echocardiogram, 12/14/2016:  Mildly depressed left ventricular ejection fraction, EF 45%.  No thrombus detected in the left atrial appendage    EP 5-2018  [facility] PM    Most recent labs:     9-17-19 total cholesterol 181, triglycerides 269, HDL 59, , sodium 140, testing 3.9, creatinine 1, LFTs normal, normal BNP.    Lab Results   Component Value Date/Time    HEMOGLOBIN 12.0 05/10/2018 06:04 AM    HEMATOCRIT 35.3 (L) 05/10/2018 06:04 AM    MCV 90.5 05/10/2018 06:04 AM    INR 1.07 05/05/2018 05:30 AM      Lab Results   Component Value Date/Time    SODIUM 130 (L) 05/10/2018 03:26 AM    POTASSIUM 5.0 05/10/2018 03:26  AM    CHLORIDE 99 05/10/2018 03:26 AM    CO2 22 05/10/2018 03:26 AM    GLUCOSE 103 (H) 05/10/2018 03:26 AM    BUN 28 (H) 05/10/2018 03:26 AM    CREATININE 0.98 05/10/2018 03:26 AM      Lab Results   Component Value Date/Time    ASTSGOT 16 2018 02:27 AM    ALTSGPT 14 2018 02:27 AM    ALBUMIN 3.6 05/10/2018 03:26 AM      Lab Results   Component Value Date/Time    CHOLSTRLTOT 110 2016 01:00 AM    LDL 63 2016 01:00 AM    HDL 37 (A) 2016 01:00 AM    TRIGLYCERIDE 49 2016 01:00 AM       5/10/2018 hemoglobin 12, 180, sodium 130, potassium 5, creatinine 0.98    2018 LFTs normal    2016 LDL 63, HDL 37, triglycerides 49, total cholesterol 110    Past Medical History:   Diagnosis Date   • Arthritis    • Back pain    • Chronic anticoagulation    • Fall 2015   • Heart block, AV 2018    Status post pacemaker placement.   • Hypertension    • Paroxysmal atrial fibrillation (HCC)    • Sepsis (HCC) 2018     Past Surgical History:   Procedure Laterality Date   • PACEMAKER INSERTION Left 2018    [employer] [facility] [employer] MRI 2272 implanted by Dr. Jones.     Family History   Problem Relation Age of Onset   • Arthritis Mother    • Cancer Mother    • Hypertension Mother    • Hypertension Father    • Heart Disease Father    • Hyperlipidemia Father    • Lung Disease Sister    • Hypertension Sister      Social History     Socioeconomic History   • Marital status: Unknown     Spouse name: Not on file   • Number of children: Not on file   • Years of education: Not on file   • Highest education level: Not on file   Occupational History   • Not on file   Tobacco Use   • Smoking status: Former Smoker     Packs/day: 0.50     Types: Cigarettes     Quit date: 1989     Years since quittin.4   • Smokeless tobacco: Never Used   Vaping Use   • Vaping Use: Never used   Substance and Sexual Activity   • Alcohol use: Yes     Comment: occasional glass of wine   • Drug use: No   • Sexual  activity: Not on file   Other Topics Concern   • Not on file   Social History Narrative   • Not on file     Social Determinants of Health     Financial Resource Strain:    • Difficulty of Paying Living Expenses:    Food Insecurity:    • Worried About Running Out of Food in the Last Year:    • Ran Out of Food in the Last Year:    Transportation Needs:    • Lack of Transportation (Medical):    • Lack of Transportation (Non-Medical):    Physical Activity:    • Days of Exercise per Week:    • Minutes of Exercise per Session:    Stress:    • Feeling of Stress :    Social Connections:    • Frequency of Communication with Friends and Family:    • Frequency of Social Gatherings with Friends and Family:    • Attends [employer] Services:    • Active Member of Clubs or Organizations:    • Attends Club or Organization Meetings:    • Marital Status:    Intimate Partner Violence:    • Fear of Current or Ex-Partner:    • Emotionally Abused:    • Physically Abused:    • Sexually Abused:      Allergies   Allergen Reactions   • Amiodarone      Atrial fibrillation..organ prob's   • Sulfa Drugs      Rash, chills, fever   • Augmentin Nausea   • Ciprofloxacin      Pt states it just didn't agree with her.       Current Outpatient Medications   Medication Sig Dispense Refill   • metoprolol tartrate (LOPRESSOR) 50 MG Tab Take 1.5 Tablets by mouth 2 times a day. 270 tablet 7   • digoxin (LANOXIN) 125 MCG Tab Take 1 tablet by mouth every day. 90 tablet 7   • potassium chloride (MICRO-K) 10 MEQ capsule Take 1 capsule by mouth every day. 90 capsule 7   • apixaban (ELIQUIS) 2.5mg Tab Take 1 Tab by mouth 2 Times a Day. 180 Tab 3   • pantoprazole (PROTONIX) 20 MG tablet Take 20 mg by mouth every day.     • allopurinol (ZYLOPRIM) 300 MG Tab Take 300 mg by mouth every day.     • furosemide (LASIX) 20 MG Tab Take 20 mg by mouth every day.     • Zolpidem Tartrate (AMBIEN PO) Take 2.5 mg by mouth.     • Ferrous Sulfate (IRON) 325 (65 Fe) MG Tab Take  by  "mouth.     • Multiple Vitamin (MULTI-VITAMIN DAILY PO) Take  by mouth.     • spironolactone (ALDACTONE) 25 MG Tab Take 0.5 Tabs by mouth every day. 30 Tab 3   • hydrocodone-acetaminophen (NORCO) 7.5-325 MG per tablet Take 1-2 Tabs by mouth every four hours as needed for Severe Pain.       No current facility-administered medications for this visit.       ROS    All others systems reviewed and negative.     Objective:     /74 (BP Location: Left arm, Patient Position: Sitting, BP Cuff Size: Adult)   Pulse (!) 116   Ht 1.626 m (5' 4\")   Wt 64 kg (141 lb)   SpO2 95%  Body mass index is 24.2 kg/m².    Physical Exam   General: No acute distress. Well nourished.  HEENT: EOM grossly intact, no scleral icterus, no pharyngeal erythema.   Neck:  No JVD, no bruits, trachea midline  CVS: irreg irreg, fast at times, normal S1, S2. No M/R/G. No LE edema.  2+ radial pulses, 2+ PT pulses, pacemaker pocket intact.  Resp: CTAB. No wheezing or crackles/rhonchi. Normal respiratory effort.  Abdomen: Soft, NT, no angelic hepatomegaly.  MSK/Ext: No clubbing or cyanosis.  Skin: Warm and dry, no rashes.  Neurological: CN III-XII grossly intact. No focal deficits. Using rolling walker.  Psych: A&O x 3, appropriate affect, good judgement    Physical exam performed today and unchanged, except what is noted, compared to 8-5-2020    Assessment:     1. Sick sinus syndrome (HCC)     2. S/P placement of cardiac pacemaker     3. Longstanding persistent atrial fibrillation (HCC)     4. AV block     5. Chronic anticoagulation     6. Cardiac pacemaker in situ     7. DELIA (obstructive sleep apnea)     8. Localized edema     9. Essential hypertension     10. Chronic diastolic congestive heart failure (HCC)     11. Hypokalemia  Basic Metabolic Panel       Medical Decision Making:  Today's Assessment / Status / Plan:     -I have requested her most recent blood work and will need to get a copy of her heart monitor once it is completed  -I felt she was " in chronic atrial fibrillation but now it is classified as longstanding persistent and more recently acting paroxysmal.  -Today her A. fib rate is too fast, I had previously reduce digoxin to metoprolol when she is not feeling well, she did not notice a significant improvement.   -I am worried about lowering her blood pressure too much  -I am going to increase metoprolol from 50 twice daily to 75 twice daily and add back digoxin.  See below.  -Hypokalemia can certainly cause symptoms, I will place her on 10 mEq, not certain what she was taking but we will get nonfasting blood work in 2 weeks.  -Tolerating blood thinner for [id number] of 5, no prior TIA/CVA, ok to stop as needed for procedures  -Some weight gain but does not appear to be water weight gain  -She will return to using Mobic on occasion, knows this can affect blood pressure and kidney function especially with potassium and spironolactone  -Spironolactone is considered high risk medication because of hyperkalemia, in her case hypokalemia but it needs to be monitored closely  -She has a home device and only needs PM check yearly, MD yearly as well in person  -Virtual visit in 6 to 8 weeks to see how she is doing    Written instructions given today:    -A normal potassium level is important for good heart pump function.    -Furosemide can cause loss of potassium, Spironolactone is supposed to help hold onto potassium. This combination of medications makes it important for us to follow your potassium level and make sure it is normal.    -Potassium chloride 10 meq daily.     -Non fasting blood work 2 weeks.    -Your new dose of metoprolol tartrate is 75 mg a.m. and p.m.  This will require you to take a 50 mg tablet (new dose, new prescription I sent today), cut 1 pill in half and take 1.5 pills in the morning and 1.5 pills in the evening.    -If you really crafty you can cut your 100 mg in half to get to 50 mg and then use those along with half of the new  pill to get to 75 mg.    -Resume digoxin 125 mcg daily.    -Virtual visit with me in 6 to 8 weeks    -Water weight gain versus ice cream weight gain can be challenging to figure out.  Signs of sudden water weight gain would typically be associated with shortness of breath, feeling like your retaining fluid around your abdomen or your legs.  It does not hurt to take an extra dose of furosemide if you are not sure.  However, I would say right now I do not think you are retaining extra water.    Return in about 6 weeks (around 7/14/2021).    It is my pleasure to participate in the care of Ms. Bennett.  Please do not hesitate to contact me with questions or concerns.    Didi Oro MD, [facility]  Cardiologist [facility]    Please note that this dictation was created using voice recognition software. I have made every reasonable attempt to correct obvious errors, but it is possible there are errors of grammar and possibly content that I did not discover before finalizing the note.        Cee Ramos M.D.  [address] [facility] [address]  Via Fax: [phone]

## 2021-06-02 NOTE — PROGRESS NOTES
Subjective:   Chief Complaint:   Chief Complaint   Patient presents with   • Atrial Fibrillation   • Bradycardia     Jeny Bennett is a 88 y.o. female who returns today for longstanding persistent atrial fibrillation, sick sinus syndrome with pacemaker placement, [employer], 5/7/2018, lightheadedness, hypokalemia.      First noted PAF around 2005, was symptomatic.  She was in [place] at the time, multiple attempts different medications.  Was on amio (had abnormal LFTs on amio) and sotalol, discussion of PM but not done. Moved here in 2011.  Had previously been on amiodarone.  Was then chronic atrial fibrillation with intermittent ventricular pacing, V paced 48%.  Now she is in A. fib 39% of the time.    Was on digoxin, metoprolol.  Was not feeling well, we stopped dig and cut metoprolol in half.    Has some diastolic dysfunction, on lasix and spironolactone.  Weights were 136 without, about 2 pound fluctuation.  Previously was 132 pounds.  Weight now around 141 pounds.  Not associated with other symptoms of water weight gain.    Chronic anticoagulation for chads 2 vascular score of at least 5. No prior TIA/CVA.  On apixaban, no major bleeding.    Has hypertension, blood pressure controlled    Last LDL cholesterol was 127.    Has moderate TR, moderate pulm HTN.    Was feeling poorly, weakness, was told K was low, was given supplements for 1 month, was better but now out of pills.  Feels better after potassium was replaced.    She uses a rolling walker.  She is not limited by chest pain, pressure or tightness with activity.   Very mild dyspnea on exertion but not bothersome, no orthopnea.  Has mild LE edema, looks like venous insufficiency.  No significant palpitations, dizziness, or presyncope/syncope.   No stroke/TIA like symptoms.  She does have fatigue, takes some naps.    Takes colchicine/allo for gout.  Had been on mexolicam but having bleeding, on PPI and Iron.  Did have prior GI bleeding in [facility], had bleeding  ulcer, colitis, [place], GERD.    Here with her daughter Kathryn.  They live in [place].    DATA REVIEWED by me:  ECG 5/8/2018  Atrial fibrillation, intermittent ventricular pacing    Pacemaker check 6-21  A paced 23%, V paced 39%, 31% mode switches    PM 8-5-2020  V paced 54%, A 1% mode switches    Pacemaker interrogation 10/29/2019   atrial fibrillation, V pacing 48%    Pacemaker interrogation 4/15/2019  Atrial fibrillation, complete heart block, V paced 44%, atrial paced 1%     Echocardiogram, 10/15/2018  Normal left ventricular systolic function. EF 60%.  Mild concentric left ventricular hypertrophy.  Moderately dilated left atrium.  Mild mitral regurgitation.  Aortic sclerosis without stenosis.  Mild (borderline moderate) aortic insufficiency.  Moderate tricuspid regurgitation.  Right ventricular systolic pressure is estimated to be 47 mmHg + JVP.  Compared to the images of the study done 12/12/2016 - there has been a   mild increase in estimated right sided pressures and no other significant change.    Echocardiogram, 12/14/2016:  Mildly depressed left ventricular ejection fraction, EF 45%.  No thrombus detected in the left atrial appendage    EP 5-2018  [facility] PM    Most recent labs:     9-17-19 total cholesterol 181, triglycerides 269, HDL 59, , sodium 140, testing 3.9, creatinine 1, LFTs normal, normal BNP.    Lab Results   Component Value Date/Time    HEMOGLOBIN 12.0 05/10/2018 06:04 AM    HEMATOCRIT 35.3 (L) 05/10/2018 06:04 AM    MCV 90.5 05/10/2018 06:04 AM    INR 1.07 05/05/2018 05:30 AM      Lab Results   Component Value Date/Time    SODIUM 130 (L) 05/10/2018 03:26 AM    POTASSIUM 5.0 05/10/2018 03:26 AM    CHLORIDE 99 05/10/2018 03:26 AM    CO2 22 05/10/2018 03:26 AM    GLUCOSE 103 (H) 05/10/2018 03:26 AM    BUN 28 (H) 05/10/2018 03:26 AM    CREATININE 0.98 05/10/2018 03:26 AM      Lab Results   Component Value Date/Time    ASTSGOT 16 05/07/2018 02:27 AM    ALTSGPT 14 05/07/2018 02:27 AM     ALBUMIN 3.6 05/10/2018 03:26 AM      Lab Results   Component Value Date/Time    CHOLSTRLTOT 110 2016 01:00 AM    LDL 63 2016 01:00 AM    HDL 37 (A) 2016 01:00 AM    TRIGLYCERIDE 49 2016 01:00 AM       5/10/2018 hemoglobin 12, 180, sodium 130, potassium 5, creatinine 0.98    2018 LFTs normal    2016 LDL 63, HDL 37, triglycerides 49, total cholesterol 110    Past Medical History:   Diagnosis Date   • Arthritis    • Back pain    • Chronic anticoagulation    • Fall 2015   • Heart block, AV 2018    Status post pacemaker placement.   • Hypertension    • Paroxysmal atrial fibrillation (HCC)    • Sepsis (HCC) 2018     Past Surgical History:   Procedure Laterality Date   • PACEMAKER INSERTION Left 2018    [employer] [facility] [employer] MRI 2272 implanted by Dr. Jones.     Family History   Problem Relation Age of Onset   • Arthritis Mother    • Cancer Mother    • Hypertension Mother    • Hypertension Father    • Heart Disease Father    • Hyperlipidemia Father    • Lung Disease Sister    • Hypertension Sister      Social History     Socioeconomic History   • Marital status: Unknown     Spouse name: Not on file   • Number of children: Not on file   • Years of education: Not on file   • Highest education level: Not on file   Occupational History   • Not on file   Tobacco Use   • Smoking status: Former Smoker     Packs/day: 0.50     Types: Cigarettes     Quit date: 1989     Years since quittin.4   • Smokeless tobacco: Never Used   Vaping Use   • Vaping Use: Never used   Substance and Sexual Activity   • Alcohol use: Yes     Comment: occasional glass of wine   • Drug use: No   • Sexual activity: Not on file   Other Topics Concern   • Not on file   Social History Narrative   • Not on file     Social Determinants of Health     Financial Resource Strain:    • Difficulty of Paying Living Expenses:    Food Insecurity:    • Worried About Running Out of Food in the Last Year:    •  Ran Out of Food in the Last Year:    Transportation Needs:    • Lack of Transportation (Medical):    • Lack of Transportation (Non-Medical):    Physical Activity:    • Days of Exercise per Week:    • Minutes of Exercise per Session:    Stress:    • Feeling of Stress :    Social Connections:    • Frequency of Communication with Friends and Family:    • Frequency of Social Gatherings with Friends and Family:    • Attends Yazidism Services:    • Active Member of Clubs or Organizations:    • Attends Club or Organization Meetings:    • Marital Status:    Intimate Partner Violence:    • Fear of Current or Ex-Partner:    • Emotionally Abused:    • Physically Abused:    • Sexually Abused:      Allergies   Allergen Reactions   • Amiodarone      Atrial fibrillation..organ prob's   • Sulfa Drugs      Rash, chills, fever   • Augmentin Nausea   • Ciprofloxacin      Pt states it just didn't agree with her.       Current Outpatient Medications   Medication Sig Dispense Refill   • metoprolol tartrate (LOPRESSOR) 50 MG Tab Take 1.5 Tablets by mouth 2 times a day. 270 tablet 7   • digoxin (LANOXIN) 125 MCG Tab Take 1 tablet by mouth every day. 90 tablet 7   • potassium chloride (MICRO-K) 10 MEQ capsule Take 1 capsule by mouth every day. 90 capsule 7   • apixaban (ELIQUIS) 2.5mg Tab Take 1 Tab by mouth 2 Times a Day. 180 Tab 3   • pantoprazole (PROTONIX) 20 MG tablet Take 20 mg by mouth every day.     • allopurinol (ZYLOPRIM) 300 MG Tab Take 300 mg by mouth every day.     • furosemide (LASIX) 20 MG Tab Take 20 mg by mouth every day.     • Zolpidem Tartrate (AMBIEN PO) Take 2.5 mg by mouth.     • Ferrous Sulfate (IRON) 325 (65 Fe) MG Tab Take  by mouth.     • Multiple Vitamin (MULTI-VITAMIN DAILY PO) Take  by mouth.     • spironolactone (ALDACTONE) 25 MG Tab Take 0.5 Tabs by mouth every day. 30 Tab 3   • hydrocodone-acetaminophen (NORCO) 7.5-325 MG per tablet Take 1-2 Tabs by mouth every four hours as needed for Severe Pain.       No  "current facility-administered medications for this visit.       ROS    All others systems reviewed and negative.     Objective:     /74 (BP Location: Left arm, Patient Position: Sitting, BP Cuff Size: Adult)   Pulse (!) 116   Ht 1.626 m (5' 4\")   Wt 64 kg (141 lb)   SpO2 95%  Body mass index is 24.2 kg/m².    Physical Exam   General: No acute distress. Well nourished.  HEENT: EOM grossly intact, no scleral icterus, no pharyngeal erythema.   Neck:  No JVD, no bruits, trachea midline  CVS: irreg irreg, fast at times, normal S1, S2. No M/R/G. No LE edema.  2+ radial pulses, 2+ PT pulses, pacemaker pocket intact.  Resp: CTAB. No wheezing or crackles/rhonchi. Normal respiratory effort.  Abdomen: Soft, NT, no angelic hepatomegaly.  MSK/Ext: No clubbing or cyanosis.  Skin: Warm and dry, no rashes.  Neurological: CN III-XII grossly intact. No focal deficits. Using rolling walker.  Psych: A&O x 3, appropriate affect, good judgement    Physical exam performed today and unchanged, except what is noted, compared to 8-5-2020    Assessment:     1. Sick sinus syndrome (HCC)     2. S/P placement of cardiac pacemaker     3. Longstanding persistent atrial fibrillation (HCC)     4. AV block     5. Chronic anticoagulation     6. Cardiac pacemaker in situ     7. DELIA (obstructive sleep apnea)     8. Localized edema     9. Essential hypertension     10. Chronic diastolic congestive heart failure (HCC)     11. Hypokalemia  Basic Metabolic Panel       Medical Decision Making:  Today's Assessment / Status / Plan:     -I have requested her most recent blood work and will need to get a copy of her heart monitor once it is completed  -I felt she was in chronic atrial fibrillation but now it is classified as longstanding persistent and more recently acting paroxysmal.  -Today her A. fib rate is too fast, I had previously reduce digoxin to metoprolol when she is not feeling well, she did not notice a significant improvement.   -I am " worried about lowering her blood pressure too much  -I am going to increase metoprolol from 50 twice daily to 75 twice daily and add back digoxin.  See below.  -Hypokalemia can certainly cause symptoms, I will place her on 10 mEq, not certain what she was taking but we will get nonfasting blood work in 2 weeks.  -Tolerating blood thinner for [id number] of 5, no prior TIA/CVA, ok to stop as needed for procedures  -Some weight gain but does not appear to be water weight gain  -She will return to using Mobic on occasion, knows this can affect blood pressure and kidney function especially with potassium and spironolactone  -Spironolactone is considered high risk medication because of hyperkalemia, in her case hypokalemia but it needs to be monitored closely  -She has a home device and only needs PM check yearly, MD yearly as well in person  -Virtual visit in 6 to 8 weeks to see how she is doing    Written instructions given today:    -A normal potassium level is important for good heart pump function.    -Furosemide can cause loss of potassium, Spironolactone is supposed to help hold onto potassium. This combination of medications makes it important for us to follow your potassium level and make sure it is normal.    -Potassium chloride 10 meq daily.     -Non fasting blood work 2 weeks.    -Your new dose of metoprolol tartrate is 75 mg a.m. and p.m.  This will require you to take a 50 mg tablet (new dose, new prescription I sent today), cut 1 pill in half and take 1.5 pills in the morning and 1.5 pills in the evening.    -If you really crafty you can cut your 100 mg in half to get to 50 mg and then use those along with half of the new pill to get to 75 mg.    -Resume digoxin 125 mcg daily.    -Virtual visit with me in 6 to 8 weeks    -Water weight gain versus ice cream weight gain can be challenging to figure out.  Signs of sudden water weight gain would typically be associated with shortness of breath, feeling like  your retaining fluid around your abdomen or your legs.  It does not hurt to take an extra dose of furosemide if you are not sure.  However, I would say right now I do not think you are retaining extra water.    Return in about 6 weeks (around 7/14/2021).    It is my pleasure to participate in the care of Ms. Bennett.  Please do not hesitate to contact me with questions or concerns.    Didi rOo MD, [facility]  Cardiologist [facility]    Please note that this dictation was created using voice recognition software. I have made every reasonable attempt to correct obvious errors, but it is possible there are errors of grammar and possibly content that I did not discover before finalizing the note.

## 2021-06-02 NOTE — PROGRESS NOTES
Device is working normally. Mode switching episodes 31% of total time (since August 2020, down from 100%); she is anticoagulated.  Normal sensing and capture of RA and RV leads; stable impedances. Battery longevity is 8.3-9.2 years.  No changes are made today.    FU in 12 months for next PM check with me.

## 2021-06-02 NOTE — PATIENT INSTRUCTIONS
-A normal potassium level is important for good heart pump function.    -Furosemide can cause loss of potassium, Spironolactone is supposed to help hold onto potassium. This combination of medications makes it important for us to follow your potassium level and make sure it is normal.    -Potassium chloride 10 meq daily.     -Non fasting blood work 2 weeks.    -Your new dose of metoprolol tartrate is 75 mg a.m. and p.m.  This will require you to take a 50 mg tablet (new dose, new prescription I sent today), cut 1 pill in half and take 1.5 pills in the morning and 1.5 pills in the evening.    -If you really crafty you can cut your 100 mg in half to get to 50 mg and then use those along with half of the new pill to get to 75 mg.    -Resume digoxin 125 mcg daily.    -Virtual visit with me in 6 to 8 weeks    -Water weight gain versus ice cream weight gain can be challenging to figure out.  Signs of sudden water weight gain would typically be associated with shortness of breath, feeling like your retaining fluid around your abdomen or your legs.  It does not hurt to take an extra dose of furosemide if you are not sure.  However, I would say right now I do not think you are retaining extra water.

## 2021-06-08 ENCOUNTER — TELEPHONE (OUTPATIENT)
Dept: CARDIOLOGY | Facility: MEDICAL CENTER | Age: 86
End: 2021-06-08

## 2021-06-28 ENCOUNTER — TELEPHONE (OUTPATIENT)
Dept: CARDIOLOGY | Facility: MEDICAL CENTER | Age: 86
End: 2021-06-28

## 2021-06-28 NOTE — TELEPHONE ENCOUNTER
LS    Pt called needing a refill of apixaban (ELIQUIS) 2.5mg Tab sent to [facility] in [place], [place].    Thank you

## 2021-07-26 NOTE — PROGRESS NOTES
Subjective:   Chief Complaint:   Chief Complaint   Patient presents with   • Atrial Fibrillation     & Sick sinus syndrome (HCC)     This evaluation was conducted via Zoom using secure and encrypted videoconferencing technology. The patient was in a private location in the [facility].   The patient's identity was confirmed and verbal consent was obtained for this virtual visit.    Jeny Bennett is a 88 y.o. female who returns today for longstanding persistent atrial fibrillation, sick sinus syndrome with pacemaker placement, [employer], 5/7/2018, lightheadedness, hypokalemia.      First noted PAF around 2005, was symptomatic.  She was in [place] at the time, multiple attempts different medications.  Was on amio (had abnormal LFTs on amio) and sotalol, discussion of PM but not done. Moved here in 2011.  Had previously been on amiodarone.  Was then chronic atrial fibrillation with intermittent ventricular pacing, V paced 48%.  Now she is in A. fib 39% of the time.  Zio patch [facility] A. fib, average 91, range , no triggers, intermittent bundle branch block    Was on digoxin, metoprolol.  Was not feeling well, we stopped dig and cut metoprolol in half.  Then went up on metoprolol.    Has some diastolic dysfunction, on lasix and spironolactone.  Weights were 136 without, about 2 pound fluctuation.  Previously was 132 pounds.  Last visit weight had gone up to 141 pounds.  Now her weight is 135 without changing medications. Felt that she lost water weight.    Chronic anticoagulation for chads 2 vascular score of at least 5. No prior TIA/CVA.  On apixaban, no major bleeding.    Has hypertension, blood pressure controlled    Last LDL cholesterol was 127.    Has moderate TR, moderate pulm HTN.    Prior record of DELIA but she was never tested so we took it out.    Was feeling poorly, weakness, was told K was low, was given supplements for 1 month, was better but now out of pills.  Feels better after  potassium was replaced.    She uses a rolling walker. Feels shaky, weakness at times.  No syncope.  She is not limited by chest pain, pressure or tightness with activity.   Very mild dyspnea on exertion but not bothersome, no orthopnea.  Has mild LE edema, looks like venous insufficiency.  No significant palpitations.   No stroke/TIA like symptoms.  She does have fatigue, takes some naps.    Takes colchicine/allo for gout.  Had been on mexolicam but having bleeding, on PPI and Iron.  Did have prior GI bleeding in [facility], had bleeding ulcer, colitis, Barretts, GERD.    With her daughter Kathryn.  They live in [place].    DATA REVIEWED by me:  ECG 5/8/2018  Atrial fibrillation, intermittent ventricular pacing    -Zio patch [facility], average 91, range , no triggers, intermittent bundle branch block    Pacemaker check 6-21  A paced 23%, V paced 39%, 31% mode switches    PM 8-5-2020  V paced 54%, A 1% mode switches    Pacemaker interrogation 10/29/2019   atrial fibrillation, V pacing 48%    Pacemaker interrogation 4/15/2019  Atrial fibrillation, complete heart block, V paced 44%, atrial paced 1%     Echocardiogram, 10/15/2018  Normal left ventricular systolic function. EF 60%.  Mild concentric left ventricular hypertrophy.  Moderately dilated left atrium.  Mild mitral regurgitation.  Aortic sclerosis without stenosis.  Mild (borderline moderate) aortic insufficiency.  Moderate tricuspid regurgitation.  Right ventricular systolic pressure is estimated to be 47 mmHg + JVP.  Compared to the images of the study done 12/12/2016 - there has been a   mild increase in estimated right sided pressures and no other significant change.    Echocardiogram, 12/14/2016:  Mildly depressed left ventricular ejection fraction, EF 45%.  No thrombus detected in the left atrial appendage    EP 5-2018  [facility] PM    Most recent labs:     6-15-21  Na 142, K 4.2, cr 0.94    9-17-19 total cholesterol 181, triglycerides 269, HDL 59,  , sodium 140, testing 3.9, creatinine 1, LFTs normal, normal BNP.    Lab Results   Component Value Date/Time    HEMOGLOBIN 12.0 05/10/2018 06:04 AM    HEMATOCRIT 35.3 (L) 05/10/2018 06:04 AM    MCV 90.5 05/10/2018 06:04 AM    INR 1.07 05/05/2018 05:30 AM      Lab Results   Component Value Date/Time    SODIUM 130 (L) 05/10/2018 03:26 AM    POTASSIUM 5.0 05/10/2018 03:26 AM    CHLORIDE 99 05/10/2018 03:26 AM    CO2 22 05/10/2018 03:26 AM    GLUCOSE 103 (H) 05/10/2018 03:26 AM    BUN 28 (H) 05/10/2018 03:26 AM    CREATININE 0.98 05/10/2018 03:26 AM      Lab Results   Component Value Date/Time    ASTSGOT 16 05/07/2018 02:27 AM    ALTSGPT 14 05/07/2018 02:27 AM    ALBUMIN 3.6 05/10/2018 03:26 AM      Lab Results   Component Value Date/Time    CHOLSTRLTOT 110 12/13/2016 01:00 AM    LDL 63 12/13/2016 01:00 AM    HDL 37 (A) 12/13/2016 01:00 AM    TRIGLYCERIDE 49 12/13/2016 01:00 AM       5/10/2018 hemoglobin 12, 180, sodium 130, potassium 5, creatinine 0.98    5/7/2018 LFTs normal    12/30/2016 LDL 63, HDL 37, triglycerides 49, total cholesterol 110    Past Medical History:   Diagnosis Date   • Arthritis    • Back pain    • Chronic anticoagulation    • Fall 12-   • Heart block, AV 05/2018    Status post pacemaker placement.   • Hypertension    • DELIA (obstructive sleep apnea) 7/30/2021   • Paroxysmal atrial fibrillation (HCC)    • Sepsis (HCC) 5/5/2018     Past Surgical History:   Procedure Laterality Date   • PACEMAKER INSERTION Left 05/07/2018    [facility] [facility] [employer] MRI 2272 implanted by Dr. Jones.     Family History   Problem Relation Age of Onset   • Arthritis Mother    • Cancer Mother    • Hypertension Mother    • Hypertension Father    • Heart Disease Father    • Hyperlipidemia Father    • Lung Disease Sister    • Hypertension Sister      Social History     Socioeconomic History   • Marital status: Unknown     Spouse name: Not on file   • Number of children: Not on file   • Years of education: Not on  file   • Highest education level: Not on file   Occupational History   • Not on file   Tobacco Use   • Smoking status: Former Smoker     Packs/day: 0.50     Types: Cigarettes     Quit date: 1989     Years since quittin.5   • Smokeless tobacco: Never Used   Vaping Use   • Vaping Use: Never used   Substance and Sexual Activity   • Alcohol use: Yes     Comment: occasional glass of wine   • Drug use: No   • Sexual activity: Not on file   Other Topics Concern   • Not on file   Social History Narrative   • Not on file     Social Determinants of Health     Financial Resource Strain:    • Difficulty of Paying Living Expenses:    Food Insecurity:    • Worried About Running Out of Food in the Last Year:    • Ran Out of Food in the Last Year:    Transportation Needs:    • Lack of Transportation (Medical):    • Lack of Transportation (Non-Medical):    Physical Activity:    • Days of Exercise per Week:    • Minutes of Exercise per Session:    Stress:    • Feeling of Stress :    Social Connections:    • Frequency of Communication with Friends and Family:    • Frequency of Social Gatherings with Friends and Family:    • Attends Rastafari Services:    • Active Member of Clubs or Organizations:    • Attends Club or Organization Meetings:    • Marital Status:    Intimate Partner Violence:    • Fear of Current or Ex-Partner:    • Emotionally Abused:    • Physically Abused:    • Sexually Abused:      Allergies   Allergen Reactions   • Amiodarone      Atrial fibrillation..organ prob's   • Sulfa Drugs      Rash, chills, fever   • Augmentin Nausea   • Ciprofloxacin      Pt states it just didn't agree with her.       Current Outpatient Medications   Medication Sig Dispense Refill   • VITAMIN D PO Take  by mouth. 3 times a week     • MELOXICAM PO Take  by mouth.     • metoprolol tartrate (LOPRESSOR) 50 MG Tab Take 1 tablet by mouth 2 times a day. 180 tablet 7   • apixaban (ELIQUIS) 2.5mg Tab Take 1 tablet by mouth 2 times a day.  "180 tablet 3   • digoxin (LANOXIN) 125 MCG Tab Take 1 tablet by mouth every day. 90 tablet 7   • potassium chloride (MICRO-K) 10 MEQ capsule Take 1 capsule by mouth every day. 90 capsule 7   • pantoprazole (PROTONIX) 20 MG tablet Take 20 mg by mouth every day.     • allopurinol (ZYLOPRIM) 300 MG Tab Take 300 mg by mouth every day.     • furosemide (LASIX) 20 MG Tab Take 20 mg by mouth every day.     • Zolpidem Tartrate (AMBIEN PO) Take 2.5 mg by mouth.     • Multiple Vitamin (MULTI-VITAMIN DAILY PO) Take  by mouth.     • spironolactone (ALDACTONE) 25 MG Tab Take 0.5 Tabs by mouth every day. 30 Tab 3   • hydrocodone-acetaminophen (NORCO) 7.5-325 MG per tablet Take 1-2 Tabs by mouth every four hours as needed for Severe Pain.       No current facility-administered medications for this visit.       ROS    All others systems reviewed and negative.     Objective:     /79 (BP Location: Left arm, Patient Position: Sitting, BP Cuff Size: Adult)   Pulse 81   Ht 1.626 m (5' 4\")   Wt 61.2 kg (135 lb)  Body mass index is 23.17 kg/m².    Vitals obtained by patient:    Physical Exam:  General: No acute distress. Well nourished.   HEENT: EOM grossly intact, no scleral icterus, no pharyngeal erythema.  Phonation normal.  Neck:  No JVD noted at 90 degrees, trachea midline, no obvious masses, moves freely without pain.  CVS: Pulse as reported by patient, no visible LE edema.  Resp: Unlabored respiratory effort, no cough or audible wheeze  MSK/Ext: No clubbing or cyanosis visible appreciated.  Skin: No rashes in visible areas.  Neurological: CN III-XII grossly intact. No focal deficits.   Psych: A&O x 3, appropriate affect, good judgement, well groomed      Assessment:     1. Sick sinus syndrome (HCC)     2. S/P placement of cardiac pacemaker     3. Longstanding persistent atrial fibrillation (HCC)     4. Cardiac pacemaker in situ     5. Chronic anticoagulation     6. Localized edema     7. Essential hypertension     8. " Chronic diastolic congestive heart failure (HCC)     9. High risk medication use         Medical Decision Making:  Today's Assessment / Status / Plan:       -I felt she was in chronic atrial fibrillation but now it is classified as longstanding persistent and more recently acting paroxysmal.  -Last A. fib rate was too fast, I had previously reduce digoxin to metoprolol when she is not feeling well, she did not notice a significant improvement, so I increased BB but she is not sure if she is feeling worse, noting more weakness.  -BP ok  -K ok now  -Tolerating blood thinner for [id number] of 5, no prior TIA/CVA, ok to stop as needed for procedures  -Goal weight is around 135 probably, where she is now  -She will return to using Mobic on occasion, knows this can affect blood pressure and kidney function especially with potassium and spironolactone  -Spironolactone is considered high risk medication because of hyperkalemia, in her case hypokalemia but it needs to be monitored closely, every 6 months  -She has a home device and only needs PM check yearly, MD yearly as well in person  -Virtual visit 3 months    Written instructions given today:      -Stay on the digoxin    -Lower metoprolol back to 50 a.m. and p.m.    -If you notice your atrial fibrillation has come back and is going fast or noticed that you are gaining water weight back up to 140 pounds, let me know.    -The weight/water loss could have been related to better control of your atrial fibrillation allowing extra water to mobilize to the kidneys more frequently.    -Stay on your Eliquis for dental work.      Return in about 3 months (around 10/30/2021).    It is my pleasure to participate in the care of Ms. Bennett.  Please do not hesitate to contact me with questions or concerns.    Didi Oro MD, [facility]  Cardiologist [facility]    Please note that this dictation was created using voice recognition software. I have  made every reasonable attempt to correct obvious errors, but it is possible there are errors of grammar and possibly content that I did not discover before finalizing the note.

## 2021-07-30 ENCOUNTER — TELEMEDICINE (OUTPATIENT)
Dept: CARDIOLOGY | Facility: MEDICAL CENTER | Age: 86
End: 2021-07-30
Payer: MEDICARE

## 2021-07-30 VITALS
SYSTOLIC BLOOD PRESSURE: 135 MMHG | HEART RATE: 81 BPM | DIASTOLIC BLOOD PRESSURE: 79 MMHG | HEIGHT: 64 IN | WEIGHT: 135 LBS | BODY MASS INDEX: 23.05 KG/M2

## 2021-07-30 DIAGNOSIS — I48.11 LONGSTANDING PERSISTENT ATRIAL FIBRILLATION (HCC): ICD-10-CM

## 2021-07-30 DIAGNOSIS — Z95.0 CARDIAC PACEMAKER IN SITU: ICD-10-CM

## 2021-07-30 DIAGNOSIS — R60.0 LOCALIZED EDEMA: ICD-10-CM

## 2021-07-30 DIAGNOSIS — Z79.899 HIGH RISK MEDICATION USE: ICD-10-CM

## 2021-07-30 DIAGNOSIS — Z95.0 S/P PLACEMENT OF CARDIAC PACEMAKER: ICD-10-CM

## 2021-07-30 DIAGNOSIS — I10 ESSENTIAL HYPERTENSION: ICD-10-CM

## 2021-07-30 DIAGNOSIS — I49.5 SICK SINUS SYNDROME (HCC): ICD-10-CM

## 2021-07-30 DIAGNOSIS — Z79.01 CHRONIC ANTICOAGULATION: ICD-10-CM

## 2021-07-30 DIAGNOSIS — I50.32 CHRONIC DIASTOLIC CONGESTIVE HEART FAILURE (HCC): ICD-10-CM

## 2021-07-30 PROBLEM — G47.33 OSA (OBSTRUCTIVE SLEEP APNEA): Status: RESOLVED | Noted: 2021-07-30 | Resolved: 2021-07-30

## 2021-07-30 PROBLEM — G47.33 OSA (OBSTRUCTIVE SLEEP APNEA): Status: ACTIVE | Noted: 2021-07-30

## 2021-07-30 PROCEDURE — 99214 OFFICE O/P EST MOD 30 MIN: CPT | Mod: 95 | Performed by: INTERNAL MEDICINE

## 2021-07-30 RX ORDER — METOPROLOL TARTRATE 50 MG/1
50 TABLET, FILM COATED ORAL 2 TIMES DAILY
Qty: 180 TABLET | Refills: 7 | Status: SHIPPED | OUTPATIENT
Start: 2021-07-30 | End: 2022-06-21 | Stop reason: SDUPTHER

## 2021-07-30 NOTE — LETTER
[facility]   [address]  [place] [address]  Phone: [phone]  Fax: [phone]              Jeny Bennett  5/19/1933    Encounter Date: 7/30/2021    Didi Oro M.D.          PROGRESS NOTE:  Subjective:   Chief Complaint:   Chief Complaint   Patient presents with   • Atrial Fibrillation     & Sick sinus syndrome (HCC)     This evaluation was conducted via Zoom using secure and encrypted videoconferencing technology. The patient was in a private location in the [facility].   The patient's identity was confirmed and verbal consent was obtained for this virtual visit.    Jeny Bennett is a 88 y.o. female who returns today for longstanding persistent atrial fibrillation, sick sinus syndrome with pacemaker placement, [employer], 5/7/2018, lightheadedness, hypokalemia.      First noted PAF around 2005, was symptomatic.  She was in [place] at the time, multiple attempts different medications.  Was on amio (had abnormal LFTs on amio) and sotalol, discussion of PM but not done. Moved here in 2011.  Had previously been on amiodarone.  Was then chronic atrial fibrillation with intermittent ventricular pacing, V paced 48%.  Now she is in A. fib 39% of the time.  Zio patch [facility] A. fib, average 91, range , no triggers, intermittent bundle branch block    Was on digoxin, metoprolol.  Was not feeling well, we stopped dig and cut metoprolol in half.  Then went up on metoprolol.    Has some diastolic dysfunction, on lasix and spironolactone.  Weights were 136 without, about 2 pound fluctuation.  Previously was 132 pounds.  Last visit weight had gone up to 141 pounds.  Now her weight is 135 without changing medications. Felt that she lost water weight.    Chronic anticoagulation for chads 2 vascular score of at least 5. No prior TIA/CVA.  On apixaban, no major bleeding.    Has hypertension, blood pressure controlled    Last LDL  cholesterol was 127.    Has moderate TR, moderate pulm HTN.    Prior record of DELIA but she was never tested so we took it out.    Was feeling poorly, weakness, was told K was low, was given supplements for 1 month, was better but now out of pills.  Feels better after potassium was replaced.    She uses a rolling walker. Feels shaky, weakness at times.  No syncope.  She is not limited by chest pain, pressure or tightness with activity.   Very mild dyspnea on exertion but not bothersome, no orthopnea.  Has mild LE edema, looks like venous insufficiency.  No significant palpitations.   No stroke/TIA like symptoms.  She does have fatigue, takes some naps.    Takes colchicine/allo for gout.  Had been on mexolicam but having bleeding, on PPI and Iron.  Did have prior GI bleeding in [facility], had bleeding ulcer, colitis, Barretts, GERD.    With her daughter Kathryn.  They live in [place].    DATA REVIEWED by me:  ECG 5/8/2018  Atrial fibrillation, intermittent ventricular pacing    -Zio patch [facility], average 91, range , no triggers, intermittent bundle branch block    Pacemaker check 6-21  A paced 23%, V paced 39%, 31% mode switches    PM 8-5-2020  V paced 54%, A 1% mode switches    Pacemaker interrogation 10/29/2019   atrial fibrillation, V pacing 48%    Pacemaker interrogation 4/15/2019  Atrial fibrillation, complete heart block, V paced 44%, atrial paced 1%     Echocardiogram, 10/15/2018  Normal left ventricular systolic function. EF 60%.  Mild concentric left ventricular hypertrophy.  Moderately dilated left atrium.  Mild mitral regurgitation.  Aortic sclerosis without stenosis.  Mild (borderline moderate) aortic insufficiency.  Moderate tricuspid regurgitation.  Right ventricular systolic pressure is estimated to be 47 mmHg + JVP.  Compared to the images of the study done 12/12/2016 - there has been a   mild increase in estimated right sided pressures and no other significant change.    Echocardiogram,  12/14/2016:  Mildly depressed left ventricular ejection fraction, EF 45%.  No thrombus detected in the left atrial appendage    EP 5-2018  [facility] PM    Most recent labs:     6-15-21  Na 142, K 4.2, cr 0.94    9-17-19 total cholesterol 181, triglycerides 269, HDL 59, , sodium 140, testing 3.9, creatinine 1, LFTs normal, normal BNP.    Lab Results   Component Value Date/Time    HEMOGLOBIN 12.0 05/10/2018 06:04 AM    HEMATOCRIT 35.3 (L) 05/10/2018 06:04 AM    MCV 90.5 05/10/2018 06:04 AM    INR 1.07 05/05/2018 05:30 AM      Lab Results   Component Value Date/Time    SODIUM 130 (L) 05/10/2018 03:26 AM    POTASSIUM 5.0 05/10/2018 03:26 AM    CHLORIDE 99 05/10/2018 03:26 AM    CO2 22 05/10/2018 03:26 AM    GLUCOSE 103 (H) 05/10/2018 03:26 AM    BUN 28 (H) 05/10/2018 03:26 AM    CREATININE 0.98 05/10/2018 03:26 AM      Lab Results   Component Value Date/Time    ASTSGOT 16 05/07/2018 02:27 AM    ALTSGPT 14 05/07/2018 02:27 AM    ALBUMIN 3.6 05/10/2018 03:26 AM      Lab Results   Component Value Date/Time    CHOLSTRLTOT 110 12/13/2016 01:00 AM    LDL 63 12/13/2016 01:00 AM    HDL 37 (A) 12/13/2016 01:00 AM    TRIGLYCERIDE 49 12/13/2016 01:00 AM       5/10/2018 hemoglobin 12, 180, sodium 130, potassium 5, creatinine 0.98    5/7/2018 LFTs normal    12/30/2016 LDL 63, HDL 37, triglycerides 49, total cholesterol 110    Past Medical History:   Diagnosis Date   • Arthritis    • Back pain    • Chronic anticoagulation    • Fall 12-   • Heart block, AV 05/2018    Status post pacemaker placement.   • Hypertension    • DELIA (obstructive sleep apnea) 7/30/2021   • Paroxysmal atrial fibrillation (HCC)    • Sepsis (HCC) 5/5/2018     Past Surgical History:   Procedure Laterality Date   • PACEMAKER INSERTION Left 05/07/2018    [facility] [employer] MRI 2272 implanted by Dr. Jones.     Family History   Problem Relation Age of Onset   • Arthritis Mother    • Cancer Mother    • Hypertension Mother    • Hypertension Father      • Heart Disease Father    • Hyperlipidemia Father    • Lung Disease Sister    • Hypertension Sister      Social History     Socioeconomic History   • Marital status: Unknown     Spouse name: Not on file   • Number of children: Not on file   • Years of education: Not on file   • Highest education level: Not on file   Occupational History   • Not on file   Tobacco Use   • Smoking status: Former Smoker     Packs/day: 0.50     Types: Cigarettes     Quit date: 1989     Years since quittin.5   • Smokeless tobacco: Never Used   Vaping Use   • Vaping Use: Never used   Substance and Sexual Activity   • Alcohol use: Yes     Comment: occasional glass of wine   • Drug use: No   • Sexual activity: Not on file   Other Topics Concern   • Not on file   Social History Narrative   • Not on file     Social Determinants of Health     Financial Resource Strain:    • Difficulty of Paying Living Expenses:    Food Insecurity:    • Worried About Running Out of Food in the Last Year:    • Ran Out of Food in the Last Year:    Transportation Needs:    • Lack of Transportation (Medical):    • Lack of Transportation (Non-Medical):    Physical Activity:    • Days of Exercise per Week:    • Minutes of Exercise per Session:    Stress:    • Feeling of Stress :    Social Connections:    • Frequency of Communication with Friends and Family:    • Frequency of Social Gatherings with Friends and Family:    • Attends [employer] Services:    • Active Member of Clubs or Organizations:    • Attends Club or Organization Meetings:    • Marital Status:    Intimate Partner Violence:    • Fear of Current or Ex-Partner:    • Emotionally Abused:    • Physically Abused:    • Sexually Abused:      Allergies   Allergen Reactions   • Amiodarone      Atrial fibrillation..organ prob's   • Sulfa Drugs      Rash, chills, fever   • Augmentin Nausea   • Ciprofloxacin      Pt states it just didn't agree with her.       Current Outpatient Medications   Medication Sig  "Dispense Refill   • VITAMIN D PO Take  by mouth. 3 times a week     • MELOXICAM PO Take  by mouth.     • metoprolol tartrate (LOPRESSOR) 50 MG Tab Take 1 tablet by mouth 2 times a day. 180 tablet 7   • apixaban (ELIQUIS) 2.5mg Tab Take 1 tablet by mouth 2 times a day. 180 tablet 3   • digoxin (LANOXIN) 125 MCG Tab Take 1 tablet by mouth every day. 90 tablet 7   • potassium chloride (MICRO-K) 10 MEQ capsule Take 1 capsule by mouth every day. 90 capsule 7   • pantoprazole (PROTONIX) 20 MG tablet Take 20 mg by mouth every day.     • allopurinol (ZYLOPRIM) 300 MG Tab Take 300 mg by mouth every day.     • furosemide (LASIX) 20 MG Tab Take 20 mg by mouth every day.     • Zolpidem Tartrate (AMBIEN PO) Take 2.5 mg by mouth.     • Multiple Vitamin (MULTI-VITAMIN DAILY PO) Take  by mouth.     • spironolactone (ALDACTONE) 25 MG Tab Take 0.5 Tabs by mouth every day. 30 Tab 3   • hydrocodone-acetaminophen (NORCO) 7.5-325 MG per tablet Take 1-2 Tabs by mouth every four hours as needed for Severe Pain.       No current facility-administered medications for this visit.       ROS    All others systems reviewed and negative.     Objective:     /79 (BP Location: Left arm, Patient Position: Sitting, BP Cuff Size: Adult)   Pulse 81   Ht 1.626 m (5' 4\")   Wt 61.2 kg (135 lb)  Body mass index is 23.17 kg/m².    Vitals obtained by patient:    Physical Exam:  General: No acute distress. Well nourished.   HEENT: EOM grossly intact, no scleral icterus, no pharyngeal erythema.  Phonation normal.  Neck:  No JVD noted at 90 degrees, trachea midline, no obvious masses, moves freely without pain.  CVS: Pulse as reported by patient, no visible LE edema.  Resp: Unlabored respiratory effort, no cough or audible wheeze  MSK/Ext: No clubbing or cyanosis visible appreciated.  Skin: No rashes in visible areas.  Neurological: CN III-XII grossly intact. No focal deficits.   Psych: A&O x 3, appropriate affect, good judgement, well " groomed      Assessment:     1. Sick sinus syndrome (HCC)     2. S/P placement of cardiac pacemaker     3. Longstanding persistent atrial fibrillation (HCC)     4. Cardiac pacemaker in situ     5. Chronic anticoagulation     6. Localized edema     7. Essential hypertension     8. Chronic diastolic congestive heart failure (HCC)     9. High risk medication use         Medical Decision Making:  Today's Assessment / Status / Plan:       -I felt she was in chronic atrial fibrillation but now it is classified as longstanding persistent and more recently acting paroxysmal.  -Last A. fib rate was too fast, I had previously reduce digoxin to metoprolol when she is not feeling well, she did not notice a significant improvement, so I increased BB but she is not sure if she is feeling worse, noting more weakness.  -BP ok  -K ok now  -Tolerating blood thinner for [id number] of 5, no prior TIA/CVA, ok to stop as needed for procedures  -Goal weight is around 135 probably, where she is now  -She will return to using Mobic on occasion, knows this can affect blood pressure and kidney function especially with potassium and spironolactone  -Spironolactone is considered high risk medication because of hyperkalemia, in her case hypokalemia but it needs to be monitored closely, every 6 months  -She has a home device and only needs PM check yearly, MD yearly as well in person  -Virtual visit 3 months    Written instructions given today:      -Stay on the digoxin    -Lower metoprolol back to 50 a.m. and p.m.    -If you notice your atrial fibrillation has come back and is going fast or noticed that you are gaining water weight back up to 140 pounds, let me know.    -The weight/water loss could have been related to better control of your atrial fibrillation allowing extra water to mobilize to the kidneys more frequently.    -Stay on your Eliquis for dental work.      Return in about 3 months (around 10/30/2021).    It is my pleasure to  participate in the care of Ms. Bennett.  Please do not hesitate to contact me with questions or concerns.    Didi Oro MD, [facility]  Cardiologist [facility]    Please note that this dictation was created using voice recognition software. I have made every reasonable attempt to correct obvious errors, but it is possible there are errors of grammar and possibly content that I did not discover before finalizing the note.        Cee Ramos M.D.  [address] [facility] [address]  Via Fax: [phone]

## 2021-08-04 ENCOUNTER — NON-PROVIDER VISIT (OUTPATIENT)
Dept: CARDIOLOGY | Facility: MEDICAL CENTER | Age: 86
End: 2021-08-04
Payer: MEDICARE

## 2021-08-04 DIAGNOSIS — Z95.0 CARDIAC PACEMAKER IN SITU: ICD-10-CM

## 2021-08-04 DIAGNOSIS — I49.5 SICK SINUS SYNDROME (HCC): ICD-10-CM

## 2021-08-04 DIAGNOSIS — I44.30 AV BLOCK: ICD-10-CM

## 2021-08-04 PROCEDURE — 93294 REM INTERROG EVL PM/LDLS PM: CPT | Performed by: INTERNAL MEDICINE

## 2021-11-04 ENCOUNTER — NON-PROVIDER VISIT (OUTPATIENT)
Dept: CARDIOLOGY | Facility: MEDICAL CENTER | Age: 86
End: 2021-11-04
Payer: MEDICARE

## 2021-11-04 PROCEDURE — 99999 PR NO CHARGE: CPT | Performed by: INTERNAL MEDICINE

## 2022-01-12 NOTE — PROGRESS NOTES
Subjective:   Chief Complaint:   Chief Complaint   Patient presents with   • Atrial Fibrillation     F/V Dx: Paroxysmal atrial fibrillation (HCC)     This evaluation was conducted via Zoom using secure and encrypted videoconferencing technology. The patient was in a private location in the [facility].    The patient's identity was confirmed and verbal consent was obtained for this virtual visit.    Jeny Bennett is a 88 y.o. female who returns today for longstanding persistent atrial fibrillation, sick sinus syndrome with pacemaker placement, [employer], 5/7/2018, lightheadedness, hypokalemia, TR.    First noted PAF around 2005, was symptomatic.  She was in [place] at the time, multiple attempts different medications.  Was on amio (had abnormal LFTs on amio) and sotalol, discussion of PM but not done. Moved here in 2011.  Had previously been on amiodarone.  Was then chronic atrial fibrillation with intermittent ventricular pacing, V paced 48%.  Now she is in A. fib 39% of the time.  Zio patch [facility] A. fib, average 91, range , no triggers, intermittent bundle branch block  PM check 8-4-21  A paced 23%, V paced 43%, mode switch 59%  Has been on dig and metoprolol, we did back down the metoprolol 50, used to be on 100.  She thinks she may have perked up on lower BB.    Chronic anticoagulation for chads 2 vascular score of at least 5. No prior TIA/CVA.  On apixaban, no major bleeding.    Has some diastolic dysfunction, on lasix and spironolactone.  Previously was 132 pounds, now 131, has been up to 135.  Prior weight of 141 appeared to be water weight.    Has moderate TR, moderate pulm HTN.    Has hypertension, blood pressure controlled  Checks at home.    Last LDL cholesterol was 127.  No need for statin.    Prior record of DELIA but she was never tested so we took it out.    Was feeling poorly, weakness, was told K was low, was given supplements for 1 month, was better but now out of pills.  Feels  better after potassium was replaced.    She uses a rolling walker. Feels shaky, weakness at times.  No syncope.  She is not limited by chest pain, pressure or tightness with activity.   Very mild dyspnea on exertion but not bothersome, no orthopnea.  Has mild LE edema, looks like venous insufficiency.  No significant palpitations.   No stroke/TIA like symptoms.  She does have fatigue, takes some naps.     Having tooth pain.    Takes colchicine/allo for gout.  Had been on mexolicam but having bleeding, on PPI and Iron.  Did have prior GI bleeding in [facility], had bleeding ulcer, colitis, Barretts, GERD.    With her daughter Kathryn.  They live in [place].    DATA REVIEWED by me:  ECG 5/8/2018  Atrial fibrillation, intermittent ventricular pacing    -Zio patch [facility]. [facility], average 91, range , no triggers, intermittent bundle branch block    PM check 8-4-21  A paced 23%, V paced 43%, mode switch 59%    Pacemaker check 6-21  A paced 23%, V paced 39%, 31% mode switches    PM 8-5-2020  V paced 54%, A 1% mode switches    Pacemaker interrogation 10/29/2019   atrial fibrillation, V pacing 48%    Pacemaker interrogation 4/15/2019  Atrial fibrillation, complete heart block, V paced 44%, atrial paced 1%     Echocardiogram, 10/15/2018  Normal left ventricular systolic function. EF 60%.  Mild concentric left ventricular hypertrophy.  Moderately dilated left atrium.  Mild mitral regurgitation.  Aortic sclerosis without stenosis.  Mild (borderline moderate) aortic insufficiency.  Moderate tricuspid regurgitation.  Right ventricular systolic pressure is estimated to be 47 mmHg + JVP.  Compared to the images of the study done 12/12/2016 - there has been a   mild increase in estimated right sided pressures and no other significant change.    Echocardiogram, 12/14/2016:  Mildly depressed left ventricular ejection fraction, EF 45%.  No thrombus detected in the left atrial appendage    EP 5-2018  [facility] PM    Most recent  labs:     6-15-21  Na 142, K 4.2, cr 0.94    9-17-19 total cholesterol 181, triglycerides 269, HDL 59, , sodium 140, testing 3.9, creatinine 1, LFTs normal, normal BNP.    Lab Results   Component Value Date/Time    HEMOGLOBIN 12.0 05/10/2018 06:04 AM    HEMATOCRIT 35.3 (L) 05/10/2018 06:04 AM    MCV 90.5 05/10/2018 06:04 AM    INR 1.07 05/05/2018 05:30 AM      Lab Results   Component Value Date/Time    SODIUM 130 (L) 05/10/2018 03:26 AM    POTASSIUM 5.0 05/10/2018 03:26 AM    CHLORIDE 99 05/10/2018 03:26 AM    CO2 22 05/10/2018 03:26 AM    GLUCOSE 103 (H) 05/10/2018 03:26 AM    BUN 28 (H) 05/10/2018 03:26 AM    CREATININE 0.98 05/10/2018 03:26 AM      Lab Results   Component Value Date/Time    ASTSGOT 16 05/07/2018 02:27 AM    ALTSGPT 14 05/07/2018 02:27 AM    ALBUMIN 3.6 05/10/2018 03:26 AM      Lab Results   Component Value Date/Time    CHOLSTRLTOT 110 12/13/2016 01:00 AM    LDL 63 12/13/2016 01:00 AM    HDL 37 (A) 12/13/2016 01:00 AM    TRIGLYCERIDE 49 12/13/2016 01:00 AM       5/10/2018 hemoglobin 12, 180, sodium 130, potassium 5, creatinine 0.98    5/7/2018 LFTs normal    12/30/2016 LDL 63, HDL 37, triglycerides 49, total cholesterol 110    Past Medical History:   Diagnosis Date   • Arthritis    • Back pain    • Chronic anticoagulation    • Fall 12-   • Heart block, AV 05/2018    Status post pacemaker placement.   • Hypertension    • DELIA (obstructive sleep apnea) 7/30/2021   • Paroxysmal atrial fibrillation (HCC)    • Sepsis (HCC) 5/5/2018     Past Surgical History:   Procedure Laterality Date   • PACEMAKER INSERTION Left 05/07/2018    [facility] [facility] [employer] MRI 2272 implanted by Dr. Jones.     Family History   Problem Relation Age of Onset   • Arthritis Mother    • Cancer Mother    • Hypertension Mother    • Hypertension Father    • Heart Disease Father    • Hyperlipidemia Father    • Lung Disease Sister    • Hypertension Sister      Social History     Socioeconomic History   • Marital status:  Unknown     Spouse name: Not on file   • Number of children: Not on file   • Years of education: Not on file   • Highest education level: Not on file   Occupational History   • Not on file   Tobacco Use   • Smoking status: Former Smoker     Packs/day: 0.50     Types: Cigarettes     Quit date: 1989     Years since quittin.0   • Smokeless tobacco: Never Used   Vaping Use   • Vaping Use: Never used   Substance and Sexual Activity   • Alcohol use: Yes     Comment: occasional glass of wine   • Drug use: No   • Sexual activity: Not on file   Other Topics Concern   • Not on file   Social History Narrative   • Not on file     Social Determinants of Health     Financial Resource Strain:    • Difficulty of Paying Living Expenses: Not on file   Food Insecurity:    • Worried About Running Out of Food in the Last Year: Not on file   • Ran Out of Food in the Last Year: Not on file   Transportation Needs:    • Lack of Transportation (Medical): Not on file   • Lack of Transportation (Non-Medical): Not on file   Physical Activity:    • Days of Exercise per Week: Not on file   • Minutes of Exercise per Session: Not on file   Stress:    • Feeling of Stress : Not on file   Social Connections:    • Frequency of Communication with Friends and Family: Not on file   • Frequency of Social Gatherings with Friends and Family: Not on file   • Attends Sikhism Services: Not on file   • Active Member of Clubs or Organizations: Not on file   • Attends Club or Organization Meetings: Not on file   • Marital Status: Not on file   Intimate Partner Violence:    • Fear of Current or Ex-Partner: Not on file   • Emotionally Abused: Not on file   • Physically Abused: Not on file   • Sexually Abused: Not on file   Housing Stability:    • Unable to Pay for Housing in the Last Year: Not on file   • Number of Places Lived in the Last Year: Not on file   • Unstable Housing in the Last Year: Not on file     Allergies   Allergen Reactions   •  "Amiodarone      Atrial fibrillation..organ prob's   • Sulfa Drugs      Rash, chills, fever   • Augmentin Nausea   • Ciprofloxacin      Pt states it just didn't agree with her.       Current Outpatient Medications   Medication Sig Dispense Refill   • VITAMIN D PO Take  by mouth. 3 times a week     • MELOXICAM PO Take  by mouth.     • metoprolol tartrate (LOPRESSOR) 50 MG Tab Take 1 tablet by mouth 2 times a day. 180 tablet 7   • apixaban (ELIQUIS) 2.5mg Tab Take 1 tablet by mouth 2 times a day. 180 tablet 3   • digoxin (LANOXIN) 125 MCG Tab Take 1 tablet by mouth every day. 90 tablet 7   • potassium chloride (MICRO-K) 10 MEQ capsule Take 1 capsule by mouth every day. 90 capsule 7   • pantoprazole (PROTONIX) 20 MG tablet Take 20 mg by mouth every day.     • allopurinol (ZYLOPRIM) 300 MG Tab Take 300 mg by mouth every day.     • furosemide (LASIX) 20 MG Tab Take 20 mg by mouth every day.     • Zolpidem Tartrate (AMBIEN PO) Take 2.5 mg by mouth.     • Multiple Vitamin (MULTI-VITAMIN DAILY PO) Take  by mouth.     • spironolactone (ALDACTONE) 25 MG Tab Take 0.5 Tabs by mouth every day. 30 Tab 3   • hydrocodone-acetaminophen (NORCO) 7.5-325 MG per tablet Take 1-2 Tabs by mouth every four hours as needed for Severe Pain.       No current facility-administered medications for this visit.       ROS    All others systems reviewed and negative.     Objective:     Ht 1.626 m (5' 4\")   Wt 59.6 kg (131 lb 6.4 oz)  Body mass index is 22.55 kg/m².    Vitals obtained by patient:    Physical Exam:  General: No acute distress. Well nourished.   HEENT: EOM grossly intact, no scleral icterus, no pharyngeal erythema.  Phonation normal.  Neck:  No JVD noted at 90 degrees, trachea midline, no obvious masses, moves freely without pain.  CVS: Pulse as reported by patient, no visible LE edema.  Resp: Unlabored respiratory effort, no cough or audible wheeze  MSK/Ext: No clubbing or cyanosis visible appreciated.  Skin: No rashes in visible " areas.  Neurological: CN III-XII grossly intact. No focal deficits.   Psych: A&O x 3, appropriate affect, good judgement, well groomed      Assessment:     1. Sick sinus syndrome (HCC)     2. Cardiac pacemaker in situ     3. AV block     4. Longstanding persistent atrial fibrillation (HCC)     5. Chronic anticoagulation     6. Localized edema     7. Essential hypertension     8. Chronic diastolic congestive heart failure (HCC)     9. DELIA (obstructive sleep apnea)     10. Hypokalemia     11. Nonrheumatic tricuspid valve regurgitation         Medical Decision Making:  Today's Assessment / Status / Plan:       -I felt she was in chronic atrial fibrillation but now it is classified as longstanding persistent and more recently acting paroxysmal. She does have mode switches but does not feel them.  -Feeling a little bit better on lower dose metoprolol.  Felt worse off of digoxin.  -Blood pressure controlled  -Potassium level back to normal but we need to see her most recent blood work  -Tolerating blood thinner for [id number] of 5, no prior TIA/CVA, ok to stop as needed for procedures  -Dry weight appears to be around 131  -She will return to using Mobic on occasion, knows this can affect blood pressure and kidney function especially with potassium and spironolactone  -Spironolactone is considered high risk medication because of hyperkalemia, in her case hypokalemia but it needs to be monitored closely, every 6 months  -She has a home device and only needs PM check yearly, MD yearly as well in person  -Virtual visit 6 months, virtual ok until covid is better    -I have requested a copy of her blood work    Written instructions given today:    - Have your dental work done on your Eliquis.    -Continue to get kidney function and potassium every 6 months.  That can be ordered by myself or your PCP.    -There is no wrist you having your teeth pulled.    -I will asked my nurse Nancy to call your dentist to let him know it be  fine to do your procedure.      Return in about 6 months (around 7/17/2022).    It is my pleasure to participate in the care of Ms. Bennett.  Please do not hesitate to contact me with questions or concerns.    Didi Oro MD, [facility]  Cardiologist [facility]    Please note that this dictation was created using voice recognition software. I have made every reasonable attempt to correct obvious errors, but it is possible there are errors of grammar and possibly content that I did not discover before finalizing the note.

## 2022-01-17 ENCOUNTER — TELEPHONE (OUTPATIENT)
Dept: CARDIOLOGY | Facility: MEDICAL CENTER | Age: 87
End: 2022-01-17

## 2022-01-17 ENCOUNTER — TELEMEDICINE (OUTPATIENT)
Dept: CARDIOLOGY | Facility: MEDICAL CENTER | Age: 87
End: 2022-01-17
Payer: MEDICARE

## 2022-01-17 VITALS — BODY MASS INDEX: 22.43 KG/M2 | HEIGHT: 64 IN | WEIGHT: 131.4 LBS

## 2022-01-17 DIAGNOSIS — I10 ESSENTIAL HYPERTENSION: ICD-10-CM

## 2022-01-17 DIAGNOSIS — I50.32 CHRONIC DIASTOLIC CONGESTIVE HEART FAILURE (HCC): ICD-10-CM

## 2022-01-17 DIAGNOSIS — Z79.01 CHRONIC ANTICOAGULATION: ICD-10-CM

## 2022-01-17 DIAGNOSIS — I36.1 NONRHEUMATIC TRICUSPID VALVE REGURGITATION: ICD-10-CM

## 2022-01-17 DIAGNOSIS — R60.0 LOCALIZED EDEMA: ICD-10-CM

## 2022-01-17 DIAGNOSIS — Z95.0 CARDIAC PACEMAKER IN SITU: ICD-10-CM

## 2022-01-17 DIAGNOSIS — G47.33 OSA (OBSTRUCTIVE SLEEP APNEA): ICD-10-CM

## 2022-01-17 DIAGNOSIS — I44.30 AV BLOCK: ICD-10-CM

## 2022-01-17 DIAGNOSIS — I49.5 SICK SINUS SYNDROME (HCC): ICD-10-CM

## 2022-01-17 DIAGNOSIS — E87.6 HYPOKALEMIA: ICD-10-CM

## 2022-01-17 DIAGNOSIS — I48.11 LONGSTANDING PERSISTENT ATRIAL FIBRILLATION (HCC): ICD-10-CM

## 2022-01-17 PROCEDURE — 99214 OFFICE O/P EST MOD 30 MIN: CPT | Mod: 95 | Performed by: INTERNAL MEDICINE

## 2022-01-17 NOTE — LETTER
[facility]   [address], [address]  [place] [address]  Phone: [phone]  Fax: [phone]              Jeny Bennett  5/19/1933    Encounter Date: 1/17/2022    Didi Oro M.D.          PROGRESS NOTE:  Subjective:   Chief Complaint:   Chief Complaint   Patient presents with   • Atrial Fibrillation     F/V Dx: Paroxysmal atrial fibrillation (HCC)     This evaluation was conducted via Zoom using secure and encrypted videoconferencing technology. The patient was in a private location in the [facility].    The patient's identity was confirmed and verbal consent was obtained for this virtual visit.    Jeny Bennett is a 88 y.o. female who returns today for longstanding persistent atrial fibrillation, sick sinus syndrome with pacemaker placement, [employer], 5/7/2018, lightheadedness, hypokalemia, TR.    First noted PAF around 2005, was symptomatic.  She was in [place] at the time, multiple attempts different medications.  Was on amio (had abnormal LFTs on amio) and sotalol, discussion of PM but not done. Moved here in 2011.  Had previously been on amiodarone.  Was then chronic atrial fibrillation with intermittent ventricular pacing, V paced 48%.  Now she is in A. fib 39% of the time.  Zio patch [facility] A. fib, average 91, range , no triggers, intermittent bundle branch block  PM check 8-4-21  A paced 23%, V paced 43%, mode switch 59%  Has been on dig and metoprolol, we did back down the metoprolol 50, used to be on 100.  She thinks she may have perked up on lower BB.    Chronic anticoagulation for chads 2 vascular score of at least 5. No prior TIA/CVA.  On apixaban, no major bleeding.    Has some diastolic dysfunction, on lasix and spironolactone.  Previously was 132 pounds, now 131, has been up to 135.  Prior weight of 141 appeared to be water weight.    Has moderate TR, moderate pulm HTN.    Has hypertension, blood pressure  controlled  Checks at home.    Last LDL cholesterol was 127.  No need for statin.    Prior record of DELIA but she was never tested so we took it out.    Was feeling poorly, weakness, was told K was low, was given supplements for 1 month, was better but now out of pills.  Feels better after potassium was replaced.    She uses a rolling walker. Feels shaky, weakness at times.  No syncope.  She is not limited by chest pain, pressure or tightness with activity.   Very mild dyspnea on exertion but not bothersome, no orthopnea.  Has mild LE edema, looks like venous insufficiency.  No significant palpitations.   No stroke/TIA like symptoms.  She does have fatigue, takes some naps.     Having tooth pain.    Takes colchicine/allo for gout.  Had been on mexolicam but having bleeding, on PPI and Iron.  Did have prior GI bleeding in [facility], had bleeding ulcer, colitis, Barretts, GERD.    With her daughter Kathryn.  They live in [place].    DATA REVIEWED by me:  ECG 5/8/2018  Atrial fibrillation, intermittent ventricular pacing    -Zio patch [facility]. fib, average 91, range , no triggers, intermittent bundle branch block    PM check 8-4-21  A paced 23%, V paced 43%, mode switch 59%    Pacemaker check 6-21  A paced 23%, V paced 39%, 31% mode switches    PM 8-5-2020  V paced 54%, A 1% mode switches    Pacemaker interrogation 10/29/2019   atrial fibrillation, V pacing 48%    Pacemaker interrogation 4/15/2019  Atrial fibrillation, complete heart block, V paced 44%, atrial paced 1%     Echocardiogram, 10/15/2018  Normal left ventricular systolic function. EF 60%.  Mild concentric left ventricular hypertrophy.  Moderately dilated left atrium.  Mild mitral regurgitation.  Aortic sclerosis without stenosis.  Mild (borderline moderate) aortic insufficiency.  Moderate tricuspid regurgitation.  Right ventricular systolic pressure is estimated to be 47 mmHg + JVP.  Compared to the images of the study done 12/12/2016 - there has been a    mild increase in estimated right sided pressures and no other significant change.    Echocardiogram, 12/14/2016:  Mildly depressed left ventricular ejection fraction, EF 45%.  No thrombus detected in the left atrial appendage    EP 5-2018  [facility] PM    Most recent labs:     6-15-21  Na 142, K 4.2, cr 0.94    9-17-19 total cholesterol 181, triglycerides 269, HDL 59, , sodium 140, testing 3.9, creatinine 1, LFTs normal, normal BNP.    Lab Results   Component Value Date/Time    HEMOGLOBIN 12.0 05/10/2018 06:04 AM    HEMATOCRIT 35.3 (L) 05/10/2018 06:04 AM    MCV 90.5 05/10/2018 06:04 AM    INR 1.07 05/05/2018 05:30 AM      Lab Results   Component Value Date/Time    SODIUM 130 (L) 05/10/2018 03:26 AM    POTASSIUM 5.0 05/10/2018 03:26 AM    CHLORIDE 99 05/10/2018 03:26 AM    CO2 22 05/10/2018 03:26 AM    GLUCOSE 103 (H) 05/10/2018 03:26 AM    BUN 28 (H) 05/10/2018 03:26 AM    CREATININE 0.98 05/10/2018 03:26 AM      Lab Results   Component Value Date/Time    ASTSGOT 16 05/07/2018 02:27 AM    ALTSGPT 14 05/07/2018 02:27 AM    ALBUMIN 3.6 05/10/2018 03:26 AM      Lab Results   Component Value Date/Time    CHOLSTRLTOT 110 12/13/2016 01:00 AM    LDL 63 12/13/2016 01:00 AM    HDL 37 (A) 12/13/2016 01:00 AM    TRIGLYCERIDE 49 12/13/2016 01:00 AM       5/10/2018 hemoglobin 12, 180, sodium 130, potassium 5, creatinine 0.98    5/7/2018 LFTs normal    12/30/2016 LDL 63, HDL 37, triglycerides 49, total cholesterol 110    Past Medical History:   Diagnosis Date   • Arthritis    • Back pain    • Chronic anticoagulation    • Fall 12-   • Heart block, AV 05/2018    Status post pacemaker placement.   • Hypertension    • DELIA (obstructive sleep apnea) 7/30/2021   • Paroxysmal atrial fibrillation (HCC)    • Sepsis (HCC) 5/5/2018     Past Surgical History:   Procedure Laterality Date   • PACEMAKER INSERTION Left 05/07/2018    [facility] [employer] MRI 2272 implanted by Dr. Jones.     Family History   Problem Relation  Age of Onset   • Arthritis Mother    • Cancer Mother    • Hypertension Mother    • Hypertension Father    • Heart Disease Father    • Hyperlipidemia Father    • Lung Disease Sister    • Hypertension Sister      Social History     Socioeconomic History   • Marital status: Unknown     Spouse name: Not on file   • Number of children: Not on file   • Years of education: Not on file   • Highest education level: Not on file   Occupational History   • Not on file   Tobacco Use   • Smoking status: Former Smoker     Packs/day: 0.50     Types: Cigarettes     Quit date: 1989     Years since quittin.0   • Smokeless tobacco: Never Used   Vaping Use   • Vaping Use: Never used   Substance and Sexual Activity   • Alcohol use: Yes     Comment: occasional glass of wine   • Drug use: No   • Sexual activity: Not on file   Other Topics Concern   • Not on file   Social History Narrative   • Not on file     Social Determinants of Health     Financial Resource Strain:    • Difficulty of Paying Living Expenses: Not on file   Food Insecurity:    • Worried About Running Out of Food in the Last Year: Not on file   • Ran Out of Food in the Last Year: Not on file   Transportation Needs:    • Lack of Transportation (Medical): Not on file   • Lack of Transportation (Non-Medical): Not on file   Physical Activity:    • Days of Exercise per Week: Not on file   • Minutes of Exercise per Session: Not on file   Stress:    • Feeling of Stress : Not on file   Social Connections:    • Frequency of Communication with Friends and Family: Not on file   • Frequency of Social Gatherings with Friends and Family: Not on file   • Attends [employer] Services: Not on file   • Active Member of Clubs or Organizations: Not on file   • Attends Club or Organization Meetings: Not on file   • Marital Status: Not on file   Intimate Partner Violence:    • Fear of Current or Ex-Partner: Not on file   • Emotionally Abused: Not on file   • Physically Abused: Not on file  "  • Sexually Abused: Not on file   Housing Stability:    • Unable to Pay for Housing in the Last Year: Not on file   • Number of Places Lived in the Last Year: Not on file   • Unstable Housing in the Last Year: Not on file     Allergies   Allergen Reactions   • Amiodarone      Atrial fibrillation..organ prob's   • Sulfa Drugs      Rash, chills, fever   • Augmentin Nausea   • Ciprofloxacin      Pt states it just didn't agree with her.       Current Outpatient Medications   Medication Sig Dispense Refill   • VITAMIN D PO Take  by mouth. 3 times a week     • MELOXICAM PO Take  by mouth.     • metoprolol tartrate (LOPRESSOR) 50 MG Tab Take 1 tablet by mouth 2 times a day. 180 tablet 7   • apixaban (ELIQUIS) 2.5mg Tab Take 1 tablet by mouth 2 times a day. 180 tablet 3   • digoxin (LANOXIN) 125 MCG Tab Take 1 tablet by mouth every day. 90 tablet 7   • potassium chloride (MICRO-K) 10 MEQ capsule Take 1 capsule by mouth every day. 90 capsule 7   • pantoprazole (PROTONIX) 20 MG tablet Take 20 mg by mouth every day.     • allopurinol (ZYLOPRIM) 300 MG Tab Take 300 mg by mouth every day.     • furosemide (LASIX) 20 MG Tab Take 20 mg by mouth every day.     • Zolpidem Tartrate (AMBIEN PO) Take 2.5 mg by mouth.     • Multiple Vitamin (MULTI-VITAMIN DAILY PO) Take  by mouth.     • spironolactone (ALDACTONE) 25 MG Tab Take 0.5 Tabs by mouth every day. 30 Tab 3   • hydrocodone-acetaminophen (NORCO) 7.5-325 MG per tablet Take 1-2 Tabs by mouth every four hours as needed for Severe Pain.       No current facility-administered medications for this visit.       ROS    All others systems reviewed and negative.     Objective:     Ht 1.626 m (5' 4\")   Wt 59.6 kg (131 lb 6.4 oz)  Body mass index is 22.55 kg/m².    Vitals obtained by patient:    Physical Exam:  General: No acute distress. Well nourished.   HEENT: EOM grossly intact, no scleral icterus, no pharyngeal erythema.  Phonation normal.  Neck:  No JVD noted at 90 degrees, trachea " midline, no obvious masses, moves freely without pain.  CVS: Pulse as reported by patient, no visible LE edema.  Resp: Unlabored respiratory effort, no cough or audible wheeze  MSK/Ext: No clubbing or cyanosis visible appreciated.  Skin: No rashes in visible areas.  Neurological: CN III-XII grossly intact. No focal deficits.   Psych: A&O x 3, appropriate affect, good judgement, well groomed      Assessment:     1. Sick sinus syndrome (HCC)     2. Cardiac pacemaker in situ     3. AV block     4. Longstanding persistent atrial fibrillation (HCC)     5. Chronic anticoagulation     6. Localized edema     7. Essential hypertension     8. Chronic diastolic congestive heart failure (HCC)     9. DELIA (obstructive sleep apnea)     10. Hypokalemia     11. Nonrheumatic tricuspid valve regurgitation         Medical Decision Making:  Today's Assessment / Status / Plan:       -I felt she was in chronic atrial fibrillation but now it is classified as longstanding persistent and more recently acting paroxysmal. She does have mode switches but does not feel them.  -Feeling a little bit better on lower dose metoprolol.  Felt worse off of digoxin.  -Blood pressure controlled  -Potassium level back to normal but we need to see her most recent blood work  -Tolerating blood thinner for [id number] of 5, no prior TIA/CVA, ok to stop as needed for procedures  -Dry weight appears to be around 131  -She will return to using Mobic on occasion, knows this can affect blood pressure and kidney function especially with potassium and spironolactone  -Spironolactone is considered high risk medication because of hyperkalemia, in her case hypokalemia but it needs to be monitored closely, every 6 months  -She has a home device and only needs PM check yearly, MD yearly as well in person  -Virtual visit 6 months, virtual ok until covid is better    -I have requested a copy of her blood work    Written instructions given today:    - Have your dental work  done on your Eliquis.    -Continue to get kidney function and potassium every 6 months.  That can be ordered by myself or your PCP.    -There is no wrist you having your teeth pulled.    -I will asked my nurse Nancy to call your dentist to let him know it be fine to do your procedure.      Return in about 6 months (around 7/17/2022).    It is my pleasure to participate in the care of Ms. Bennett.  Please do not hesitate to contact me with questions or concerns.    Didi Oro MD, [facility]  Cardiologist [facility]    Please note that this dictation was created using voice recognition software. I have made every reasonable attempt to correct obvious errors, but it is possible there are errors of grammar and possibly content that I did not discover before finalizing the note.          No Recipients

## 2022-01-17 NOTE — PATIENT INSTRUCTIONS
- Have your dental work done on your Eliquis.    -Continue to get kidney function and potassium every 6 months.  That can be ordered by myself or your PCP.    -There is no wrist you having your teeth pulled.    -I will asked my nurse Nancy to call your dentist to let him know it be fine to do your procedure.

## 2022-01-17 NOTE — TELEPHONE ENCOUNTER
Call dentist-[phone], cell, leave message. Dr. Rincon.  Received: Today  Didi Oro M.D.  Nancy Woods R.N.  Pt having her tooth pulled tomorrow.  She needs to stay on her apixaban.  Her dentist office is apparently try to get in touch with us and has failed to do so.  Please let them know she can have her tooth pulled but she should stay on Eliquis.     Thank you.     Dentist personal cell,  [phone], cell, leave message. Dr. Rincon.   -------------------------------------------------------------------------------------------------------    Left detailed VM informing Dr. Rincon. Call back number provided for further questions or concerns.

## 2022-04-01 ENCOUNTER — TELEPHONE (OUTPATIENT)
Dept: CARDIOLOGY | Facility: MEDICAL CENTER | Age: 87
End: 2022-04-01
Payer: MEDICARE

## 2022-04-01 NOTE — TELEPHONE ENCOUNTER
LYRIC Cruz,    This patient only has enough tablets till Monday for apixaban (ELIQUIS) 2.5mg Tab. Please call her back at [phone] once sent over to the pharmacy below:    [facility] [place] - [address]   [address] [address]   Phone:  [phone]  Fax:  [phone]       Thank you,    RONNIE

## 2022-04-26 ENCOUNTER — TELEPHONE (OUTPATIENT)
Dept: CARDIOLOGY | Facility: MEDICAL CENTER | Age: 87
End: 2022-04-26
Payer: MEDICARE

## 2022-04-26 NOTE — TELEPHONE ENCOUNTER
PACER QUESTIONS     Patient called and has some questions regarding her pacemaker and the heart monitor device that she has. Please advise.     Thank you,  Carlos Alberto MARRUFO

## 2022-04-27 NOTE — TELEPHONE ENCOUNTER
Spoke with patient--she is requesting an appointment with Dr. Oro before she leaves--tasked to scheduling pool.

## 2022-05-15 NOTE — PROGRESS NOTES
Subjective:   Chief Complaint:   Chief Complaint   Patient presents with   • Atrial Fibrillation     F/V Dx: Paroxysmal atrial fibrillation (HCC)   • Edema     F/V Dx: Localized edema       • Other     F/V Dx: Sick sinus syndrome (HCC)     Jeny Bennett is a 88 y.o. female who returns today for persistent atrial fibrillation, SSS/PM ([facility] 2018), chronic anticoagulation, amiodarone allergy, tricuspid regurgitation, secondary pulmonary hypertension, fatigue, LE edema.    First noted PAF around 2005, was symptomatic.  She was in [place] at the time, multiple attempts different medications.  Was on amio (had abnormal LFTs on amio) and sotalol, discussion of PM but not done. Moved here in 2011.  Had previously been on amiodarone but allergic to it.  Was then chronic atrial fibrillation with intermittent ventricular pacing, V paced 48%.  Now she is in A. fib 39% of the time.  Zio patch [facility] A. fib, average 91, range , no triggers, intermittent bundle branch block  PM check 8-4-21  A paced 23%, V paced 43%, mode switch 59%  Has been on dig and metoprolol, we did back down the metoprolol 50, used to be on 100.  She thinks she may have perked up on lower BB.  Had 1 episode in physical therapy where systolic blood pressure was in the 90s and heart rate was irregular, blood pressure was in fact 90/58.    Chronic anticoagulation for chads 2 vascular score of at least 5. No prior TIA/CVA.  On apixaban, no major bleeding.    Has some diastolic dysfunction, on lasix and spironolactone.  Previously was 132 pounds, now 131, has been up to 135.  Prior weight of 141 appeared to be water weight.  She does use an extra as needed furosemide.  Has mild LE edema, looks like some component of venous insufficiency and not entirely right-sided pressures.  She is on furosemide 20 mg and spironolactone 12.5.  So    Has moderate TR, moderate pulm HTN.  Last echocardiogram I believe was  in 2018.    Has hypertension,  blood pressure controlled  Checks at home.  Had a blood pressure of 90/58 with physical therapy.  Heart rate 72.  Blood pressures otherwise have been really good in the 1 teens to 120s at home.    Last LDL cholesterol was 127.  No need for statin.    Prior record of DELIA but she was never tested so we took it out.    Was feeling poorly, weakness, was told K was low, was given supplements for 1 month, was better but now out of pills.  Feels better after potassium was replaced.    She uses a rolling walker. Feels shaky, weakness at times.  No syncope.  She is not limited by chest pain, pressure or tightness with activity.   Very mild dyspnea on exertion but not bothersome, no orthopnea.  No significant palpitations.   No stroke/TIA like symptoms.  She does have fatigue, takes some naps.     Having tooth pain.    Takes colchicine/allo for gout.  Had been on mexolicam but having bleeding, on PPI and Iron.  Did have prior GI bleeding in [facility], had bleeding ulcer, colitis, Bar[place]ts, GERD.    With her daughter Kathryn.  They live in [place].    DATA REVIEWED by me:  ECG 5/8/2018  Atrial fibrillation, intermittent ventricular pacing    -Zio patch [facility] A. fib, average 91, range , no triggers, intermittent bundle branch block    PM check 8-4-21  A paced 23%, V paced 43%, mode switch 59%    Pacemaker check 6-21  A paced 23%, V paced 39%, 31% mode switches    PM 8-5-2020  V paced 54%, A 1% mode switches    Pacemaker interrogation 10/29/2019   atrial fibrillation, V pacing 48%    Pacemaker interrogation 4/15/2019  Atrial fibrillation, complete heart block, V paced 44%, atrial paced 1%     Echocardiogram, 10/15/2018  Normal left ventricular systolic function. EF 60%.  Mild concentric left ventricular hypertrophy.  Moderately dilated left atrium.  Mild mitral regurgitation.  Aortic sclerosis without stenosis.  Mild (borderline moderate) aortic insufficiency.  Moderate tricuspid regurgitation.  Right ventricular systolic  pressure is estimated to be 47 mmHg + JVP.  Compared to the images of the study done 12/12/2016 - there has been a   mild increase in estimated right sided pressures and no other significant change.    Echocardiogram, 12/14/2016:  Mildly depressed left ventricular ejection fraction, EF 45%.  No thrombus detected in the left atrial appendage    EP 5-2018  [facility] PM    Most recent labs:     6-15-21  Na 142, K 4.2, cr 0.94    9-17-19 total cholesterol 181, triglycerides 269, HDL 59, , sodium 140, testing 3.9, creatinine 1, LFTs normal, normal BNP.    Lab Results   Component Value Date/Time    HEMOGLOBIN 12.0 05/10/2018 06:04 AM    HEMATOCRIT 35.3 (L) 05/10/2018 06:04 AM    MCV 90.5 05/10/2018 06:04 AM    INR 1.07 05/05/2018 05:30 AM      Lab Results   Component Value Date/Time    SODIUM 130 (L) 05/10/2018 03:26 AM    POTASSIUM 5.0 05/10/2018 03:26 AM    CHLORIDE 99 05/10/2018 03:26 AM    CO2 22 05/10/2018 03:26 AM    GLUCOSE 103 (H) 05/10/2018 03:26 AM    BUN 28 (H) 05/10/2018 03:26 AM    CREATININE 0.98 05/10/2018 03:26 AM      Lab Results   Component Value Date/Time    ASTSGOT 16 05/07/2018 02:27 AM    ALTSGPT 14 05/07/2018 02:27 AM    ALBUMIN 3.6 05/10/2018 03:26 AM      Lab Results   Component Value Date/Time    CHOLSTRLTOT 110 12/13/2016 01:00 AM    LDL 63 12/13/2016 01:00 AM    HDL 37 (A) 12/13/2016 01:00 AM    TRIGLYCERIDE 49 12/13/2016 01:00 AM       5/10/2018 hemoglobin 12, 180, sodium 130, potassium 5, creatinine 0.98    5/7/2018 LFTs normal    12/30/2016 LDL 63, HDL 37, triglycerides 49, total cholesterol 110    Past Medical History:   Diagnosis Date   • Arthritis    • Back pain    • Chronic anticoagulation    • Fall 12-   • Heart block, AV 05/2018    Status post pacemaker placement.   • Hypertension    • DELIA (obstructive sleep apnea) 7/30/2021   • Paroxysmal atrial fibrillation (HCC)    • Sepsis (HCC) 5/5/2018     Past Surgical History:   Procedure Laterality Date   • PACEMAKER INSERTION  Left 2018    [facility] MRI 2272 implanted by Dr. Jones.     Family History   Problem Relation Age of Onset   • Arthritis Mother    • Cancer Mother    • Hypertension Mother    • Hypertension Father    • Heart Disease Father    • Hyperlipidemia Father    • Lung Disease Sister    • Hypertension Sister      Social History     Socioeconomic History   • Marital status: Unknown     Spouse name: Not on file   • Number of children: Not on file   • Years of education: Not on file   • Highest education level: Not on file   Occupational History   • Not on file   Tobacco Use   • Smoking status: Former Smoker     Packs/day: 0.50     Types: Cigarettes     Quit date: 1989     Years since quittin.3   • Smokeless tobacco: Never Used   Vaping Use   • Vaping Use: Never used   Substance and Sexual Activity   • Alcohol use: Yes     Comment: occasional glass of wine   • Drug use: No   • Sexual activity: Not on file   Other Topics Concern   • Not on file   Social History Narrative   • Not on file     Social Determinants of Health     Financial Resource Strain: Not on file   Food Insecurity: Not on file   Transportation Needs: Not on file   Physical Activity: Not on file   Stress: Not on file   Social Connections: Not on file   Intimate Partner Violence: Not on file   Housing Stability: Not on file     Allergies   Allergen Reactions   • Amiodarone      Atrial fibrillation..organ prob's   • Sulfa Drugs      Rash, chills, fever   • Augmentin Nausea   • Ciprofloxacin      Pt states it just didn't agree with her.       Current Outpatient Medications   Medication Sig Dispense Refill   • diclofenac sodium (VOLTAREN) 1 % Gel      • zolpidem (AMBIEN) 5 MG Tab Take 2.5 mg by mouth at bedtime as needed.     • furosemide (LASIX) 40 MG Tab Take 1 Tablet by mouth every day. 30 Tablet    • dronedarone (MULTAQ) 400 MG Tab Take 1 Tablet by mouth 2 times a day with meals. 60 Tablet 11   • apixaban (ELIQUIS) 2.5mg Tab Take 1  "Tablet by mouth 2 times a day. 180 Tablet 2   • VITAMIN D PO Take  by mouth. 3 times a week     • MELOXICAM PO Take  by mouth.     • metoprolol tartrate (LOPRESSOR) 50 MG Tab Take 1 tablet by mouth 2 times a day. 180 tablet 7   • digoxin (LANOXIN) 125 MCG Tab Take 1 tablet by mouth every day. 90 tablet 7   • potassium chloride (MICRO-K) 10 MEQ capsule Take 1 capsule by mouth every day. 90 capsule 7   • pantoprazole (PROTONIX) 20 MG tablet Take 20 mg by mouth every day.     • allopurinol (ZYLOPRIM) 300 MG Tab Take 300 mg by mouth every day.     • Multiple Vitamin (MULTI-VITAMIN DAILY PO) Take  by mouth.     • spironolactone (ALDACTONE) 25 MG Tab Take 0.5 Tabs by mouth every day. 30 Tab 3   • hydrocodone-acetaminophen (NORCO) 7.5-325 MG per tablet Take 1-2 Tabs by mouth every four hours as needed for Severe Pain.       No current facility-administered medications for this visit.       ROS    All others systems reviewed and negative.     Objective:     /68 (BP Location: Left arm, Patient Position: Sitting, BP Cuff Size: Adult)   Pulse 62   Resp 14   Ht 1.626 m (5' 4\")   Wt 60.8 kg (134 lb 0.6 oz)   SpO2 96%  Body mass index is 23.01 kg/m².    Physical Exam   General: No acute distress. Well nourished.  HEENT: EOM grossly intact, no scleral icterus, no pharyngeal erythema.   Neck:  No JVD at 90, no bruits, trachea midline  CVS: irreg irreg, fast at times, normal S1, S2. No M/R/G. 1+ non pitting LE edema.  2+ radial pulses, 2+ PT pulses, pacemaker pocket intact.  Resp: CTAB. No wheezing or crackles/rhonchi. Normal respiratory effort.  Abdomen: Soft, NT, no angelic hepatomegaly.  MSK/Ext: No clubbing or cyanosis.  Skin: Warm and dry, no rashes.  Neurological: CN III-XII grossly intact. No focal deficits. Using rolling walker.  Psych: A&O x 3, appropriate affect, good judgement     Physical exam performed today and unchanged, except what is noted, compared to 6-2-21      Assessment:     1. Cardiac pacemaker in " situ     2. Sick sinus syndrome (HCC)     3. AV block     4. Longstanding persistent atrial fibrillation (HCC)     5. Chronic anticoagulation     6. Localized edema     7. Nonrheumatic tricuspid valve regurgitation  EC-ECHOCARDIOGRAM COMPLETE W/O CONT   8. S/P placement of cardiac pacemaker     9. Hypokalemia     10. DELIA (obstructive sleep apnea)     11. Chronic diastolic congestive heart failure (HCC)     12. Essential hypertension     13. Adverse effect of amiodarone, subsequent encounter         Medical Decision Making:  Today's Assessment / Status / Plan:     -I felt she was in chronic atrial fibrillation but now it is classified as longstanding persistent and more recently acting paroxysmal.   -I have been trying to figure out if her breakthrough atrial fibrillation is why she feels poorly.  She did have 1 episode of low blood pressure at physical therapy associated with A. fib.  -Feeling a little bit better on lower dose metoprolol.  Felt worse off of digoxin.  -I did offer Multaq in place of digoxin and metoprolol if it is affordable, she will consider this.  -Another option would be EP referral and back on sotalol but she have to come up to the hospital.  -Blood pressure controlled overall, I be more concerned about occasional low blood pressures.  -Potassium level back to normal but we need to see her most recent blood work  -Tolerating blood thinner for RVSJt1onad of 5, no prior TIA/CVA, ok to stop as needed for procedures  -Dry weight appears to be around 131  -LE edema probably includes venous insufficiency  -She will return to using Mobic on occasion, knows this can affect blood pressure and kidney function especially with potassium and spironolactone  -Spironolactone is considered high risk medication because of hyperkalemia, in her case hypokalemia but it needs to be monitored closely, every 6 months  -We will get an echocardiogram in [place] to look at RVSP and TR  *Until we have another cardiologist  in [place], will be difficult to coordinate doctor visits with pacemaker visits with any.  *I want her to have first available pacemaker check with Ashley at her convenience  *We will schedule a telemedicine with one of our [facility] providers, after she meets them virtually, she can follow-up at [facility] with one of them until we have a provider again in [place]  -Kidney function following today's admission every 3 to 6 months.    Written instructions given today:    -[phone] ([phone])- call [facility] to see if they can tell you about whether or not your pacemaker is MRI compatible.    -I can tell you that you are not pacemaker dependent and so it would be okay from a cardiac standpoint to have the MRI.    -The MRI department at [facility] and you will have a protocol to determine if you can have an MRI with your pacemaker.    -There is no great way to tell if your atrial fibrillation is partly why you do not feel well.    -We can try to control your atrial fibrillation better by using a medication called Multaq.  Traditionally we would use amiodarone but you are allergic.      -Our only other option is to send you back to the hospital to have the started on sotalol.  I would send you to an electrophysiologist cardiologist to be seen before starting this.  They could see you virtually from [place] because they only work in [place].    -Multaq is an easy medication to take.  When you are ready to start taking it, you simply stop your metoprolol and digoxin and take the Multaq in their place.    -I sent the medication to [facility]'s pharmacy for a price check as it tends to be expensive.  The other name of the medication is Ratna around.    We are [facility] cardiologists providing care with [facility].  Please contact [facility] for scheduling and rescheduling at [phone].    The [facility] Team: Dr. Michael Edmondson, Dr. Marshall Gamble, Dr. Dallas Manuel, Lara  Boogie.    For all clinical questions related to your heart care including medications, always call the main [facility] cardiology office in [place] at [phone].    If you need to see me or Ashley (Nurse practitioner) in [place], call [facility] or use [employer].    MyChart is the easiest way to communicate.    Call the [employer] line to get set up, either on your computer or by downloading an sheron on your smart phone.  Keep them on the phone with you until you are sure you can access the sheron or website.  If you use your phone, you can set up a finger print to use if you tend to forget passwords.  Call [phone] or [phone].          Return in about 3 months (around 8/18/2022).    It is my pleasure to participate in the care of Ms. Bennett.  Please do not hesitate to contact me with questions or concerns.    Didi Oro MD, [facility]  Cardiologist [facility]    Please note that this dictation was created using voice recognition software. I have made every reasonable attempt to correct obvious errors, but it is possible there are errors of grammar and possibly content that I did not discover before finalizing the note.

## 2022-05-18 ENCOUNTER — OFFICE VISIT (OUTPATIENT)
Dept: CARDIOLOGY | Facility: PHYSICIAN GROUP | Age: 87
End: 2022-05-18
Payer: MEDICARE

## 2022-05-18 VITALS
OXYGEN SATURATION: 96 % | SYSTOLIC BLOOD PRESSURE: 116 MMHG | RESPIRATION RATE: 14 BRPM | BODY MASS INDEX: 22.88 KG/M2 | HEIGHT: 64 IN | WEIGHT: 134.04 LBS | DIASTOLIC BLOOD PRESSURE: 68 MMHG | HEART RATE: 62 BPM

## 2022-05-18 DIAGNOSIS — I50.32 CHRONIC DIASTOLIC CONGESTIVE HEART FAILURE (HCC): ICD-10-CM

## 2022-05-18 DIAGNOSIS — I10 ESSENTIAL HYPERTENSION: ICD-10-CM

## 2022-05-18 DIAGNOSIS — Z95.0 CARDIAC PACEMAKER IN SITU: ICD-10-CM

## 2022-05-18 DIAGNOSIS — G47.33 OSA (OBSTRUCTIVE SLEEP APNEA): ICD-10-CM

## 2022-05-18 DIAGNOSIS — Z95.0 S/P PLACEMENT OF CARDIAC PACEMAKER: ICD-10-CM

## 2022-05-18 DIAGNOSIS — I44.30 AV BLOCK: ICD-10-CM

## 2022-05-18 DIAGNOSIS — E87.6 HYPOKALEMIA: ICD-10-CM

## 2022-05-18 DIAGNOSIS — Z79.01 CHRONIC ANTICOAGULATION: ICD-10-CM

## 2022-05-18 DIAGNOSIS — I49.5 SICK SINUS SYNDROME (HCC): ICD-10-CM

## 2022-05-18 DIAGNOSIS — I36.1 NONRHEUMATIC TRICUSPID VALVE REGURGITATION: ICD-10-CM

## 2022-05-18 DIAGNOSIS — T46.2X5D ADVERSE EFFECT OF AMIODARONE, SUBSEQUENT ENCOUNTER: ICD-10-CM

## 2022-05-18 DIAGNOSIS — R60.0 LOCALIZED EDEMA: ICD-10-CM

## 2022-05-18 DIAGNOSIS — I48.11 LONGSTANDING PERSISTENT ATRIAL FIBRILLATION (HCC): ICD-10-CM

## 2022-05-18 PROCEDURE — 99214 OFFICE O/P EST MOD 30 MIN: CPT | Performed by: INTERNAL MEDICINE

## 2022-05-18 RX ORDER — FUROSEMIDE 40 MG/1
TABLET ORAL
COMMUNITY
Start: 2022-02-21 | End: 2022-05-18

## 2022-05-18 RX ORDER — FUROSEMIDE 40 MG/1
40 TABLET ORAL DAILY
Qty: 30 TABLET | COMMUNITY
Start: 2022-05-18

## 2022-05-18 RX ORDER — DRONEDARONE 400 MG/1
400 TABLET, FILM COATED ORAL 2 TIMES DAILY WITH MEALS
Qty: 60 TABLET | Refills: 11 | Status: SHIPPED | OUTPATIENT
Start: 2022-05-18 | End: 2022-11-10

## 2022-05-18 RX ORDER — ZOLPIDEM TARTRATE 5 MG/1
2.5 TABLET ORAL
COMMUNITY
Start: 2022-04-15

## 2022-05-18 NOTE — PATIENT INSTRUCTIONS
-[phone] ([phone])- call [facility] to see if they can tell you about whether or not your pacemaker is MRI compatible.    -I can tell you that you are not pacemaker dependent and so it would be okay from a cardiac standpoint to have the MRI.    -The MRI department at [facility] and you will have a protocol to determine if you can have an MRI with your pacemaker.    -There is no great way to tell if your atrial fibrillation is partly why you do not feel well.    -We can try to control your atrial fibrillation better by using a medication called Multaq.  Traditionally we would use amiodarone but you are allergic.      -Our only other option is to send you back to the hospital to have the started on sotalol.  I would send you to an electrophysiologist cardiologist to be seen before starting this.  They could see you virtually from [place] because they only work in [place].    -Multaq is an easy medication to take.  When you are ready to start taking it, you simply stop your metoprolol and digoxin and take the Multaq in their place.    -I sent the medication to [facility]'s pharmacy for a price check as it tends to be expensive.  The other name of the medication is Ratna todd.    We are [facility] cardiologists providing care with [facility].  Please contact [facility] for scheduling and rescheduling at [phone].    The [facility] Team: Dr. Michael Edmondson, Dr. Marshall Gamble, Dr. Dallas Manuel, Lara Hyman.    For all clinical questions related to your heart care including medications, always call the main [facility] cardiology office in [place] at [phone].    If you need to see me or Ashley (Nurse practitioner) in [place], call [facility] or use [employer].    MyChart is the easiest way to communicate.    Call the "[employer] line to get set up, either on your computer or by downloading an sheron on your smart phone.  Keep them on the phone with you until you are sure you can access the sheron or  website.  If you use your phone, you can set up a finger print to use if you tend to forget passwords.  Call [phone] or [phone].

## 2022-05-18 NOTE — LETTER
[facility]   [address]  [place] [address]  Phone: [phone]  Fax: [phone]              Jeny Bennett  5/19/1933    Encounter Date: 5/18/2022    Didi Oro M.D.          PROGRESS NOTE:  Subjective:   Chief Complaint:   Chief Complaint   Patient presents with   • Atrial Fibrillation     F/V Dx: Paroxysmal atrial fibrillation (HCC)   • Edema     F/V Dx: Localized edema       • Other     F/V Dx: Sick sinus syndrome (HCC)     Jeny Bennett is a 88 y.o. female who returns today for persistent atrial fibrillation, SSS/PM ([facility] 2018), chronic anticoagulation, amiodarone allergy, tricuspid regurgitation, secondary pulmonary hypertension, fatigue, LE edema.    First noted PAF around 2005, was symptomatic.  She was in [place] at the time, multiple attempts different medications.  Was on amio (had abnormal LFTs on amio) and sotalol, discussion of PM but not done. Moved here in 2011.  Had previously been on amiodarone but allergic to it.  Was then chronic atrial fibrillation with intermittent ventricular pacing, V paced 48%.  Now she is in A. fib 39% of the time.  Zio patch [facility] A. fib, average 91, range , no triggers, intermittent bundle branch block  PM check 8-4-21  A paced 23%, V paced 43%, mode switch 59%  Has been on dig and metoprolol, we did back down the metoprolol 50, used to be on 100.  She thinks she may have perked up on lower BB.  Had 1 episode in physical therapy where systolic blood pressure was in the 90s and heart rate was irregular, blood pressure was in fact 90/58.    Chronic anticoagulation for chads 2 vascular score of at least 5. No prior TIA/CVA.  On apixaban, no major bleeding.    Has some diastolic dysfunction, on lasix and spironolactone.  Previously was 132 pounds, now 131, has been up to 135.  Prior weight of 141 appeared to be water weight.  She does use an extra as needed  furosemide.  Has mild LE edema, looks like some component of venous insufficiency and not entirely right-sided pressures.  She is on furosemide 20 mg and spironolactone 12.5.  So    Has moderate TR, moderate pulm HTN.  Last echocardiogram I believe was [place] in 2018.    Has hypertension, blood pressure controlled  Checks at home.  Had a blood pressure of 90/58 with physical therapy.  Heart rate 72.  Blood pressures otherwise have been really good in the 1 teens to 120s at home.    Last LDL cholesterol was 127.  No need for statin.    Prior record of DELIA but she was never tested so we took it out.    Was feeling poorly, weakness, was told K was low, was given supplements for 1 month, was better but now out of pills.  Feels better after potassium was replaced.    She uses a rolling walker. Feels shaky, weakness at times.  No syncope.  She is not limited by chest pain, pressure or tightness with activity.   Very mild dyspnea on exertion but not bothersome, no orthopnea.  No significant palpitations.   No stroke/TIA like symptoms.  She does have fatigue, takes some naps.     Having tooth pain.    Takes colchicine/allo for gout.  Had been on mexolicam but having bleeding, on PPI and Iron.  Did have prior GI bleeding in [facility], had bleeding ulcer, colitis, [place]ts, GERD.    With her daughter Kathryn.  They live in [place].    DATA REVIEWED by me:  ECG 5/8/2018  Atrial fibrillation, intermittent ventricular pacing    -Zio patch [facility], average 91, range , no triggers, intermittent bundle branch block    PM check 8-4-21  A paced 23%, V paced 43%, mode switch 59%    Pacemaker check 6-21  A paced 23%, V paced 39%, 31% mode switches    PM 8-5-2020  V paced 54%, A 1% mode switches    Pacemaker interrogation 10/29/2019   atrial fibrillation, V pacing 48%    Pacemaker interrogation 4/15/2019  Atrial fibrillation, complete heart block, V paced 44%, atrial paced 1%     Echocardiogram, 10/15/2018  Normal left  ventricular systolic function. EF 60%.  Mild concentric left ventricular hypertrophy.  Moderately dilated left atrium.  Mild mitral regurgitation.  Aortic sclerosis without stenosis.  Mild (borderline moderate) aortic insufficiency.  Moderate tricuspid regurgitation.  Right ventricular systolic pressure is estimated to be 47 mmHg + JVP.  Compared to the images of the study done 12/12/2016 - there has been a   mild increase in estimated right sided pressures and no other significant change.    Echocardiogram, 12/14/2016:  Mildly depressed left ventricular ejection fraction, EF 45%.  No thrombus detected in the left atrial appendage    EP 5-2018  [facility] PM    Most recent labs:     6-15-21  Na 142, K 4.2, cr 0.94    9-17-19 total cholesterol 181, triglycerides 269, HDL 59, , sodium 140, testing 3.9, creatinine 1, LFTs normal, normal BNP.    Lab Results   Component Value Date/Time    HEMOGLOBIN 12.0 05/10/2018 06:04 AM    HEMATOCRIT 35.3 (L) 05/10/2018 06:04 AM    MCV 90.5 05/10/2018 06:04 AM    INR 1.07 05/05/2018 05:30 AM      Lab Results   Component Value Date/Time    SODIUM 130 (L) 05/10/2018 03:26 AM    POTASSIUM 5.0 05/10/2018 03:26 AM    CHLORIDE 99 05/10/2018 03:26 AM    CO2 22 05/10/2018 03:26 AM    GLUCOSE 103 (H) 05/10/2018 03:26 AM    BUN 28 (H) 05/10/2018 03:26 AM    CREATININE 0.98 05/10/2018 03:26 AM      Lab Results   Component Value Date/Time    ASTSGOT 16 05/07/2018 02:27 AM    ALTSGPT 14 05/07/2018 02:27 AM    ALBUMIN 3.6 05/10/2018 03:26 AM      Lab Results   Component Value Date/Time    CHOLSTRLTOT 110 12/13/2016 01:00 AM    LDL 63 12/13/2016 01:00 AM    HDL 37 (A) 12/13/2016 01:00 AM    TRIGLYCERIDE 49 12/13/2016 01:00 AM       5/10/2018 hemoglobin 12, 180, sodium 130, potassium 5, creatinine 0.98    5/7/2018 LFTs normal    12/30/2016 LDL 63, HDL 37, triglycerides 49, total cholesterol 110    Past Medical History:   Diagnosis Date   • Arthritis    • Back pain    • Chronic  anticoagulation    • Fall 2015   • Heart block, AV 2018    Status post pacemaker placement.   • Hypertension    • DELIA (obstructive sleep apnea) 2021   • Paroxysmal atrial fibrillation (HCC)    • Sepsis (HCC) 2018     Past Surgical History:   Procedure Laterality Date   • PACEMAKER INSERTION Left 2018    [facility] [employer] MRI 2272 implanted by Dr. Jones.     Family History   Problem Relation Age of Onset   • Arthritis Mother    • Cancer Mother    • Hypertension Mother    • Hypertension Father    • Heart Disease Father    • Hyperlipidemia Father    • Lung Disease Sister    • Hypertension Sister      Social History     Socioeconomic History   • Marital status: Unknown     Spouse name: Not on file   • Number of children: Not on file   • Years of education: Not on file   • Highest education level: Not on file   Occupational History   • Not on file   Tobacco Use   • Smoking status: Former Smoker     Packs/day: 0.50     Types: Cigarettes     Quit date: 1989     Years since quittin.3   • Smokeless tobacco: Never Used   Vaping Use   • Vaping Use: Never used   Substance and Sexual Activity   • Alcohol use: Yes     Comment: occasional glass of wine   • Drug use: No   • Sexual activity: Not on file   Other Topics Concern   • Not on file   Social History Narrative   • Not on file     Social Determinants of Health     Financial Resource Strain: Not on file   Food Insecurity: Not on file   Transportation Needs: Not on file   Physical Activity: Not on file   Stress: Not on file   Social Connections: Not on file   Intimate Partner Violence: Not on file   Housing Stability: Not on file     Allergies   Allergen Reactions   • Amiodarone      Atrial fibrillation..organ prob's   • Sulfa Drugs      Rash, chills, fever   • Augmentin Nausea   • Ciprofloxacin      Pt states it just didn't agree with her.       Current Outpatient Medications   Medication Sig Dispense Refill   • diclofenac sodium  "(VOLTAREN) 1 % Gel      • zolpidem (AMBIEN) 5 MG Tab Take 2.5 mg by mouth at bedtime as needed.     • furosemide (LASIX) 40 MG Tab Take 1 Tablet by mouth every day. 30 Tablet    • dronedarone (MULTAQ) 400 MG Tab Take 1 Tablet by mouth 2 times a day with meals. 60 Tablet 11   • apixaban (ELIQUIS) 2.5mg Tab Take 1 Tablet by mouth 2 times a day. 180 Tablet 2   • VITAMIN D PO Take  by mouth. 3 times a week     • MELOXICAM PO Take  by mouth.     • metoprolol tartrate (LOPRESSOR) 50 MG Tab Take 1 tablet by mouth 2 times a day. 180 tablet 7   • digoxin (LANOXIN) 125 MCG Tab Take 1 tablet by mouth every day. 90 tablet 7   • potassium chloride (MICRO-K) 10 MEQ capsule Take 1 capsule by mouth every day. 90 capsule 7   • pantoprazole (PROTONIX) 20 MG tablet Take 20 mg by mouth every day.     • allopurinol (ZYLOPRIM) 300 MG Tab Take 300 mg by mouth every day.     • Multiple Vitamin (MULTI-VITAMIN DAILY PO) Take  by mouth.     • spironolactone (ALDACTONE) 25 MG Tab Take 0.5 Tabs by mouth every day. 30 Tab 3   • hydrocodone-acetaminophen (NORCO) 7.5-325 MG per tablet Take 1-2 Tabs by mouth every four hours as needed for Severe Pain.       No current facility-administered medications for this visit.       ROS    All others systems reviewed and negative.     Objective:     /68 (BP Location: Left arm, Patient Position: Sitting, BP Cuff Size: Adult)   Pulse 62   Resp 14   Ht 1.626 m (5' 4\")   Wt 60.8 kg (134 lb 0.6 oz)   SpO2 96%  Body mass index is 23.01 kg/m².    Physical Exam   General: No acute distress. Well nourished.  HEENT: EOM grossly intact, no scleral icterus, no pharyngeal erythema.   Neck:  No JVD at 90, no bruits, trachea midline  CVS: irreg irreg, fast at times, normal S1, S2. No M/R/G. 1+ non pitting LE edema.  2+ radial pulses, 2+ PT pulses, pacemaker pocket intact.  Resp: CTAB. No wheezing or crackles/rhonchi. Normal respiratory effort.  Abdomen: Soft, NT, no angelic hepatomegaly.  MSK/Ext: No clubbing or " cyanosis.  Skin: Warm and dry, no rashes.  Neurological: CN III-XII grossly intact. No focal deficits. Using rolling walker.  Psych: A&O x 3, appropriate affect, good judgement     Physical exam performed today and unchanged, except what is noted, compared to 6-2-21      Assessment:     1. Cardiac pacemaker in situ     2. Sick sinus syndrome (HCC)     3. AV block     4. Longstanding persistent atrial fibrillation (HCC)     5. Chronic anticoagulation     6. Localized edema     7. Nonrheumatic tricuspid valve regurgitation  EC-ECHOCARDIOGRAM COMPLETE W/O CONT   8. S/P placement of cardiac pacemaker     9. Hypokalemia     10. DELIA (obstructive sleep apnea)     11. Chronic diastolic congestive heart failure (HCC)     12. Essential hypertension     13. Adverse effect of amiodarone, subsequent encounter         Medical Decision Making:  Today's Assessment / Status / Plan:     -I felt she was in chronic atrial fibrillation but now it is classified as longstanding persistent and more recently acting paroxysmal.   -I have been trying to figure out if her breakthrough atrial fibrillation is why she feels poorly.  She did have 1 episode of low blood pressure at physical therapy associated with A. fib.  -Feeling a little bit better on lower dose metoprolol.  Felt worse off of digoxin.  -I did offer Multaq in place of digoxin and metoprolol if it is affordable, she will consider this.  -Another option would be EP referral and back on sotalol but she have to come up to the hospital.  -Blood pressure controlled overall, I be more concerned about occasional low blood pressures.  -Potassium level back to normal but we need to see her most recent blood work  -Tolerating blood thinner for [id number] of 5, no prior TIA/CVA, ok to stop as needed for procedures  -Dry weight appears to be around 131  -LE edema probably includes venous insufficiency  -She will return to using Mobic on occasion, knows this can affect blood pressure and  kidney function especially with potassium and spironolactone  -Spironolactone is considered high risk medication because of hyperkalemia, in her case hypokalemia but it needs to be monitored closely, every 6 months  -We will get an echocardiogram in [place] to look at RVSP and TR  *Until we have another cardiologist in [place], will be difficult to coordinate doctor visits with pacemaker visits with any.  *I want her to have first available pacemaker check with Ashley at her convenience  *We will schedule a telemedicine with one of our [facility] providers, after she meets them virtually, she can follow-up at [facility] with one of them until we have a provider again in [place]  -Kidney function following today's admission every 3 to 6 months.    Written instructions given today:    -[phone] ([phone])- call [facility] to see if they can tell you about whether or not your pacemaker is MRI compatible.    -I can tell you that you are not pacemaker dependent and so it would be okay from a cardiac standpoint to have the MRI.    -The MRI department at [facility] and you will have a protocol to determine if you can have an MRI with your pacemaker.    -There is no great way to tell if your atrial fibrillation is partly why you do not feel well.    -We can try to control your atrial fibrillation better by using a medication called Multaq.  Traditionally we would use amiodarone but you are allergic.      -Our only other option is to send you back to the hospital to have the started on sotalol.  I would send you to an electrophysiologist cardiologist to be seen before starting this.  They could see you virtually from [place] because they only work in [place].    -Multaq is an easy medication to take.  When you are ready to start taking it, you simply stop your metoprolol and digoxin and take the Multaq in their place.    -I sent the medication to [facility] for a price check as it tends to be  expensive.  The other name of the medication is Ratna todd.    We are [facility] cardiologists providing care with [facility].  Please contact [facility] for scheduling and rescheduling at [phone].    The [facility] Team: Dr. Michael Edmondson, Dr. Marshall Gamble, Dr. Dallas Manuel, Lara Hyman.    For all clinical questions related to your heart care including medications, always call the main [facility] cardiology office in [place] at [phone].    If you need to see me or Ashley (Nurse practitioner) in [place], call [facility] or use [employer].    Arr[employer] is the easiest way to communicate.    Call the [employer] line to get set up, either on your computer or by downloading an sheron on your smart phone.  Keep them on the phone with you until you are sure you can access the sheron or website.  If you use your phone, you can set up a finger print to use if you tend to forget passwords.  Call [phone] or [phone].          Return in about 3 months (around 8/18/2022).    It is my pleasure to participate in the care of Ms. Bennett.  Please do not hesitate to contact me with questions or concerns.    Didi Oro MD, [facility]  Cardiologist [facility] for [facility]    Please note that this dictation was created using voice recognition software. I have made every reasonable attempt to correct obvious errors, but it is possible there are errors of grammar and possibly content that I did not discover before finalizing the note.          No Recipients

## 2022-06-06 ENCOUNTER — TELEPHONE (OUTPATIENT)
Dept: CARDIOLOGY | Facility: MEDICAL CENTER | Age: 87
End: 2022-06-06
Payer: MEDICARE

## 2022-06-06 NOTE — TELEPHONE ENCOUNTER
Caller: Jeny Bennett    Topic/issue: ECHO ORDERS    Patient states that she needs her ECHO order sent over to [facility] in [place], [place]. Please advise.    Thank you,  Carlos Alberto MARRUFO    Callback Number: [phone] (home)

## 2022-06-06 NOTE — TELEPHONE ENCOUNTER
Echo order faxed to [phone]  Confirmation status received  Scan to [facility]   -------------------------    Sent my chart message

## 2022-06-20 ENCOUNTER — TELEPHONE (OUTPATIENT)
Dept: CARDIOLOGY | Facility: MEDICAL CENTER | Age: 87
End: 2022-06-20
Payer: MEDICARE

## 2022-06-20 NOTE — TELEPHONE ENCOUNTER
MEDICATION REFILL :     Caller: Jeny Bennett    Medication Name and Dosage: POTASSIUM CHLORIDE 10MEQ    Did patient contact pharmacy (If no, please call pharmacy first): YES    Medication amount left: 5 DAYS    Preferred Pharmacy:     [facility] - [place], [place] - [address]    Other questions (Topic): NONE    Callback Number (Will only call for issues): [phone] (home)

## 2022-06-21 RX ORDER — POTASSIUM CHLORIDE 750 MG/1
10 CAPSULE, EXTENDED RELEASE ORAL DAILY
Qty: 90 CAPSULE | Refills: 3 | Status: SHIPPED | OUTPATIENT
Start: 2022-06-21 | End: 2022-11-10

## 2022-06-21 RX ORDER — DIGOXIN 125 MCG
125 TABLET ORAL DAILY
Qty: 90 TABLET | Refills: 7 | Status: SHIPPED | OUTPATIENT
Start: 2022-06-21 | End: 2022-06-21

## 2022-06-21 RX ORDER — DIGOXIN 125 MCG
125 TABLET ORAL DAILY
Qty: 90 TABLET | Refills: 3 | Status: SHIPPED | OUTPATIENT
Start: 2022-06-21 | End: 2022-06-22

## 2022-06-21 RX ORDER — POTASSIUM CHLORIDE 750 MG/1
10 CAPSULE, EXTENDED RELEASE ORAL DAILY
Qty: 90 CAPSULE | Refills: 7 | Status: SHIPPED | OUTPATIENT
Start: 2022-06-21 | End: 2022-06-21

## 2022-06-21 RX ORDER — METOPROLOL TARTRATE 50 MG/1
50 TABLET, FILM COATED ORAL 2 TIMES DAILY
Qty: 180 TABLET | Refills: 3 | Status: SHIPPED | OUTPATIENT
Start: 2022-06-21 | End: 2022-06-22

## 2022-06-21 RX ORDER — METOPROLOL TARTRATE 50 MG/1
50 TABLET, FILM COATED ORAL 2 TIMES DAILY
Qty: 180 TABLET | Refills: 7 | Status: SHIPPED | OUTPATIENT
Start: 2022-06-21 | End: 2022-06-21

## 2022-06-22 ENCOUNTER — NON-PROVIDER VISIT (OUTPATIENT)
Dept: CARDIOLOGY | Facility: PHYSICIAN GROUP | Age: 87
End: 2022-06-22
Payer: MEDICARE

## 2022-06-22 VITALS
HEIGHT: 64 IN | WEIGHT: 134.48 LBS | HEART RATE: 83 BPM | SYSTOLIC BLOOD PRESSURE: 112 MMHG | OXYGEN SATURATION: 96 % | RESPIRATION RATE: 16 BRPM | DIASTOLIC BLOOD PRESSURE: 74 MMHG | BODY MASS INDEX: 22.96 KG/M2

## 2022-06-22 DIAGNOSIS — I49.5 SICK SINUS SYNDROME (HCC): ICD-10-CM

## 2022-06-22 DIAGNOSIS — Z95.0 S/P PLACEMENT OF CARDIAC PACEMAKER: ICD-10-CM

## 2022-06-22 DIAGNOSIS — I48.0 PAROXYSMAL ATRIAL FIBRILLATION (HCC): ICD-10-CM

## 2022-06-22 DIAGNOSIS — I44.30 AV BLOCK: ICD-10-CM

## 2022-06-22 PROCEDURE — 93280 PM DEVICE PROGR EVAL DUAL: CPT | Performed by: NURSE PRACTITIONER

## 2022-06-22 NOTE — PROGRESS NOTES
At follow-up on 5/18/2022, Multaq was added by Dr. Oro. Patient seems to be tolerating this fine.    Device is working normally. Mode switching episodes 84% of total time.  Normal sensing of RA and RV leads; stable capture of LV lead; stable impedances. Battery longevity is 8.3-9.8 years.  No changes are made today.    Suggest checking remote [email or web address] data in 2 months; if AFib burden is still high, might consider stopping Multaq. She does have follow-up with Dr. Edmondson (Virtual Visit) in August 2022.    Follow-up in 12 months for next PM check with me.

## 2022-06-30 ENCOUNTER — NON-PROVIDER VISIT (OUTPATIENT)
Dept: CARDIOLOGY | Facility: MEDICAL CENTER | Age: 87
End: 2022-06-30
Payer: MEDICARE

## 2022-06-30 ENCOUNTER — TELEPHONE (OUTPATIENT)
Dept: CARDIOLOGY | Facility: MEDICAL CENTER | Age: 87
End: 2022-06-30

## 2022-06-30 PROCEDURE — 93294 REM INTERROG EVL PM/LDLS PM: CPT | Performed by: INTERNAL MEDICINE

## 2022-06-30 NOTE — TELEPHONE ENCOUNTER
AB    Caller:Jeny Bennett    Topic/issue: if you need to contact her please call her my chart is down  Callback Number: [phone] (home)     Thank you,  [place] DUMONT

## 2022-06-30 NOTE — CARDIAC REMOTE MONITOR - SCAN
Device transmission reviewed. Device demonstrated appropriate function.       Electronically Signed by: Bella Jones M.D.    8/4/2022  1:21 PM

## 2022-08-24 ENCOUNTER — TELEMEDICINE (OUTPATIENT)
Dept: CARDIOLOGY | Facility: MEDICAL CENTER | Age: 87
End: 2022-08-24
Payer: MEDICARE

## 2022-08-24 VITALS
HEIGHT: 64 IN | WEIGHT: 131 LBS | HEART RATE: 96 BPM | SYSTOLIC BLOOD PRESSURE: 121 MMHG | BODY MASS INDEX: 22.36 KG/M2 | DIASTOLIC BLOOD PRESSURE: 80 MMHG

## 2022-08-24 DIAGNOSIS — Z79.01 CHRONIC ANTICOAGULATION: ICD-10-CM

## 2022-08-24 DIAGNOSIS — D68.69 HYPERCOAGULABLE STATE DUE TO ATRIAL FIBRILLATION (HCC): ICD-10-CM

## 2022-08-24 DIAGNOSIS — Z95.0 S/P PLACEMENT OF CARDIAC PACEMAKER: ICD-10-CM

## 2022-08-24 DIAGNOSIS — I10 ESSENTIAL HYPERTENSION: ICD-10-CM

## 2022-08-24 DIAGNOSIS — I49.5 SICK SINUS SYNDROME (HCC): ICD-10-CM

## 2022-08-24 DIAGNOSIS — I48.0 PAROXYSMAL ATRIAL FIBRILLATION (HCC): ICD-10-CM

## 2022-08-24 DIAGNOSIS — I48.91 HYPERCOAGULABLE STATE DUE TO ATRIAL FIBRILLATION (HCC): ICD-10-CM

## 2022-08-24 PROCEDURE — 99214 OFFICE O/P EST MOD 30 MIN: CPT | Mod: 95 | Performed by: INTERNAL MEDICINE

## 2022-08-24 RX ORDER — DIGOXIN 125 MCG
125 TABLET ORAL DAILY
Qty: 90 TABLET | Refills: 3 | Status: SHIPPED | OUTPATIENT
Start: 2022-08-24 | End: 2023-10-17 | Stop reason: SDUPTHER

## 2022-08-24 NOTE — PROGRESS NOTES
Cardiology Telemedicine Visit Follow-up Consultation Note    Date of note:    8/24/2022    Primary Care Provider: Cee Ramos M.D.    Name:             Jeny Bennett     YOB: 1933  MRN:               [id number]      This evaluation was conducted via Zoom, using secure and encrypted videoconferencing technology.  The patient was at home in the state of [place].  The patient's identity was confirmed and verbal consent for the telemedicine encounter was obtained.       Chief Complaint   Patient presents with    Atrial Fibrillation     F/V Dx:Paroxysmal atrial fibrillation (HCC)      Edema     F/V Dx: Localized edema       HISTORY OF PRESENT ILLNESS  Ms. Jeny Bennett is a 89 y.o. female who returns to see us for follow-up     Pertinent History:  Persistent atrial fibrillation  SSS s/p pacemaker implantation 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 Oro.  Is new to me.    Interim History:  Since her last visit, she was started on Multaq 400 mg twice daily.  Metoprolol and digoxin were discontinued due to ongoing fatigue.  Has noted improvement in her fatigue but has also noted elevated resting heart rate.  Is concerned about her resting heart rate being above 80 bpm.  Is wondering if she should restart digoxin.    Is tolerating anticoagulation without any bleeding concerns.      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    Hypertension     DELIA (obstructive sleep apnea) 7/30/2021    Paroxysmal atrial fibrillation (HCC)     Sepsis (HCC) 5/5/2018         Past Surgical History:   Procedure Laterality Date    PACEMAKER INSERTION Left 05/07/2018    [facility]  Assurity MRI 2272 implanted by Dr. Jones.         Current Outpatient Medications   Medication Sig Dispense Refill    digoxin (LANOXIN) 125 MCG Tab Take 1 Tablet by mouth every day. 90 Tablet 3    potassium chloride (MICRO-K) 10 MEQ capsule Take 1 Capsule by mouth every day. 90 Capsule 3    zolpidem (AMBIEN) 5 MG Tab Take 2.5 mg by mouth at bedtime as needed.      furosemide (LASIX) 40 MG Tab Take 1 Tablet by mouth every day. 30 Tablet     dronedarone (MULTAQ) 400 MG Tab Take 1 Tablet by mouth 2 times a day with meals. 60 Tablet 11    apixaban (ELIQUIS) 2.5mg Tab Take 1 Tablet by mouth 2 times a day. 180 Tablet 2    VITAMIN D PO Take  by mouth. 3 times a week      MELOXICAM PO Take  by mouth.      pantoprazole (PROTONIX) 20 MG tablet Take 20 mg by mouth every day.      allopurinol (ZYLOPRIM) 300 MG Tab Take 300 mg by mouth every day.      spironolactone (ALDACTONE) 25 MG Tab Take 0.5 Tabs by mouth every day. 30 Tab 3    hydrocodone-acetaminophen (NORCO) 7.5-325 MG per tablet Take 1-2 Tabs by mouth every four hours as needed for Severe Pain.       No current facility-administered medications for this visit.         Allergies   Allergen Reactions    Amiodarone      Atrial fibrillation..organ prob's    Sulfa Drugs      Rash, chills, fever    Augmentin Nausea    Ciprofloxacin      Pt states it just didn't agree with her.         Family History   Problem Relation Age of Onset    Arthritis Mother     Cancer Mother     Hypertension Mother     Hypertension Father     Heart Disease Father     Hyperlipidemia Father     Lung Disease Sister     Hypertension Sister          Social History     Socioeconomic History    Marital status:      Spouse name: Not on file    Number of children: Not on file    Years of education: Not on file    Highest education level: Not on file   Occupational History    Not on file   Tobacco Use    Smoking status: Former     Packs/day: 0.50     Types: Cigarettes     Quit date: 1/1/1989     Years  "since quittin.6    Smokeless tobacco: Never   Vaping Use    Vaping Use: Never used   Substance and Sexual Activity    Alcohol use: Yes     Comment: occasional glass of wine    Drug use: No    Sexual activity: Not on file   Other Topics Concern    Not on file   Social History Narrative    Not on file     Social Determinants of Health     Financial Resource Strain: Not on file   Food Insecurity: Not on file   Transportation Needs: Not on file   Physical Activity: Not on file   Stress: Not on file   Social Connections: Not on file   Intimate Partner Violence: Not on file   Housing Stability: Not on file         Physical Exam:  Vitals as provided by the patient  /80 (BP Location: Left arm)   Pulse 96   Ht 1.626 m (5' 4\")   Wt 59.4 kg (131 lb)    Oxygen Therapy:     BP Readings from Last 4 Encounters:   22 121/80   22 112/74   22 116/68   21 135/79       Weight/BMI: Body mass index is 22.49 kg/m².  Wt Readings from Last 4 Encounters:   22 59.4 kg (131 lb)   22 61 kg (134 lb 7.7 oz)   22 60.8 kg (134 lb 0.6 oz)   22 59.6 kg (131 lb 6.4 oz)       GEN: Well developed, well nourished and in no acute distress.  Neck:  No JVD noted at 90 degrees, trachea midline  CVS: Pulse as reported by patient, no visible LE edema.  Resp: Unlabored respiratory effort, no cough or audible wheeze  MSK/Ext: No clubbing or cyanosis visible appreciated.  Skin: No rashes in visible areas.  Psych: A&O x 3, appropriate affect, good judgement, well groomed      Lab Data Review:  Lab Results   Component Value Date/Time    CHOLSTRLTOT 110 2016 01:00 AM    LDL 63 2016 01:00 AM    HDL 37 (A) 2016 01:00 AM    TRIGLYCERIDE 49 2016 01:00 AM       Lab Results   Component Value Date/Time    SODIUM 130 (L) 05/10/2018 03:26 AM    POTASSIUM 5.0 05/10/2018 03:26 AM    CHLORIDE 99 05/10/2018 03:26 AM    CO2 22 05/10/2018 03:26 AM    GLUCOSE 103 (H) 05/10/2018 03:26 AM    BUN 28 " (H) 05/10/2018 03:26 AM    CREATININE 0.98 05/10/2018 03:26 AM     Lab Results   Component Value Date/Time    ALKPHOSPHAT 61 05/07/2018 02:27 AM    ASTSGOT 16 05/07/2018 02:27 AM    ALTSGPT 14 05/07/2018 02:27 AM    TBILIRUBIN 0.5 05/07/2018 02:27 AM      Lab Results   Component Value Date/Time    WBC 6.5 05/10/2018 06:04 AM     No results found for: HBA1C    Cardiac Imaging and Procedures Review:    EKG dated 5/8/2018: My personal interpretation is A. fib/flutter with V paced complexes    Echo dated 10/15/2018:   My personal interpretation is normal left ventricular size and function, moderate TR with RVSP consistent with moderate pulmonary hypertension    Device interrogation 6/30/2022:  99.9% AF burden with 26% V pacing.  Battery life 4.6 years        Assessment & Plan     1. Paroxysmal atrial fibrillation (HCC)  digoxin (LANOXIN) 125 MCG Tab      2. Chronic anticoagulation        3. S/P placement of cardiac pacemaker        4. Sick sinus syndrome (HCC)        5. Hypercoagulable state due to atrial fibrillation (HCC)        6. Essential hypertension            Last device interrogation showed 100% AF burden.  Was initiated on Multaq 400 mg twice daily but believes that her heart rate is not well controlled with it.  Was previously on metoprolol and digoxin which were discontinued due to ongoing fatigue.  Fatigue has improved after discontinuation of metoprolol.  However, due to elevated heart rate, reinitiate digoxin 125 mcg daily.  Reports no prior side effects to the medication.  Understands that this is a high risk medication.    Echocardiogram with most recent lab tests completed at [facility].  Will obtain records.    Continue oral anticoagulation with Eliquis 2.5 mg twice daily.  Tolerating well without any bleeding concerns.      All of patient's excellent questions were answered to the best of my knowledge and to her satisfaction.  It was a pleasure seeing Ms. Jeny Bennett in my clinic  today. Return in about 11 weeks (around 11/9/2022). Patient is aware to call the cardiology clinic with any questions or concerns.      Michael Edmondson MD  [facility]  [facility], [facility]  [address] [address]  Phone: [phone]  Fax: [phone]

## 2022-09-29 ENCOUNTER — NON-PROVIDER VISIT (OUTPATIENT)
Dept: CARDIOLOGY | Facility: MEDICAL CENTER | Age: 87
End: 2022-09-29
Payer: MEDICARE

## 2022-09-29 PROCEDURE — 93294 REM INTERROG EVL PM/LDLS PM: CPT | Performed by: INTERNAL MEDICINE

## 2022-10-26 ENCOUNTER — HOSPITAL ENCOUNTER (OUTPATIENT)
Dept: RADIOLOGY | Facility: MEDICAL CENTER | Age: 87
End: 2022-10-26
Attending: STUDENT IN AN ORGANIZED HEALTH CARE EDUCATION/TRAINING PROGRAM
Payer: MEDICARE

## 2022-10-26 ENCOUNTER — HOSPITAL ENCOUNTER (OUTPATIENT)
Dept: RADIOLOGY | Facility: MEDICAL CENTER | Age: 87
End: 2022-10-26
Payer: MEDICARE

## 2022-10-31 ENCOUNTER — TELEPHONE (OUTPATIENT)
Dept: SURGICAL ONCOLOGY | Facility: MEDICAL CENTER | Age: 87
End: 2022-10-31
Payer: MEDICARE

## 2022-10-31 NOTE — TELEPHONE ENCOUNTER
0932 10/31/22  Pt was contacted and identity was verified. Pt was asked if she could come in to the office on 11/02 earlier than her scheduled appt at 1400. Pt stated that she would like to keep the 1400 appt.  Maye CARDONA

## 2022-11-02 ENCOUNTER — OFFICE VISIT (OUTPATIENT)
Dept: SURGICAL ONCOLOGY | Facility: MEDICAL CENTER | Age: 87
End: 2022-11-02
Payer: MEDICARE

## 2022-11-02 VITALS
BODY MASS INDEX: 22.49 KG/M2 | OXYGEN SATURATION: 95 % | HEART RATE: 74 BPM | TEMPERATURE: 97.3 F | DIASTOLIC BLOOD PRESSURE: 70 MMHG | SYSTOLIC BLOOD PRESSURE: 130 MMHG | WEIGHT: 131 LBS

## 2022-11-02 DIAGNOSIS — K82.8 GALLBLADDER MASS: ICD-10-CM

## 2022-11-02 DIAGNOSIS — Z85.09 H/O CANCER OF GALL BLADDER: ICD-10-CM

## 2022-11-02 DIAGNOSIS — C23 GALLBLADDER CANCER (HCC): ICD-10-CM

## 2022-11-02 PROCEDURE — 99205 OFFICE O/P NEW HI 60 MIN: CPT | Performed by: SURGERY

## 2022-11-04 ASSESSMENT — ENCOUNTER SYMPTOMS
HEARTBURN: 0
DIARRHEA: 0
FOCAL WEAKNESS: 0
BRUISES/BLEEDS EASILY: 0
NAUSEA: 0
FEVER: 0
BLURRED VISION: 0
NERVOUS/ANXIOUS: 0
ORTHOPNEA: 0
TREMORS: 0
DOUBLE VISION: 0
COUGH: 0
PALPITATIONS: 0
MYALGIAS: 0
CONSTIPATION: 0
CLAUDICATION: 0
BACK PAIN: 0
DIZZINESS: 0
WEIGHT LOSS: 1
EYE DISCHARGE: 0
HALLUCINATIONS: 0
TINGLING: 0
NECK PAIN: 0
VOMITING: 0
SPUTUM PRODUCTION: 0
SENSORY CHANGE: 0
DEPRESSION: 0
SPEECH CHANGE: 0
HEADACHES: 0
ABDOMINAL PAIN: 1
EYE PAIN: 0
HEMOPTYSIS: 0
CHILLS: 0
PHOTOPHOBIA: 0

## 2022-11-04 ASSESSMENT — LIFESTYLE VARIABLES: SUBSTANCE_ABUSE: 0

## 2022-11-04 NOTE — H&P
Surgical Oncology History and Physical    Date of Evaluation: 11/2/2022    Reason for Referral: Gallbladder mass    Referred By: Shayy Greer MD ([place], [place])    HPI: Patient is a 89-year-old female with a history of heart failure (last ejection fraction around 35% per chart review), chronic atrial fibrillation with a pacemaker in place and on anticoagulation, arthritis, hypertension who presents for newly identified gallbladder mass.    The patient notes that over the last few months she has generally been not feeling well, has been more tired and fatigued than usual with a somewhat poor appetite.  This prompted presentation to the local hospital in [facility] on 10/22/2022 because her symptoms were getting even worse over the 3 days prior to admission.  The symptoms were mostly weakness and fatigue along with a poor appetite and she had started to become nauseated.  On work-up at the hospital she was noted have a white blood cell count of 23,000 and on CT scan some suggestion of appendicitis without an appendicolith for which she was started on antibiotics.  Additionally there was concern for gallbladder mass noted on her CT scan at that time.  The plan was for her to eventually go to the operating room for an appendectomy but this was delayed due to her anticoagulation and her symptoms began to improve with antibiotic treatment.  An ultrasound was also performed which is described below but was concerning for a intraluminal gallbladder mass with internal vascularization concerning for malignancy.  Given all these findings and her improvement in symptoms the plan was changed from surgery to conservative management with antibiotics.  She was eventually discharged from the hospital around 10/24/2022 and referred to my office for evaluation of this newly diagnosed gallbladder mass.      Summary of work-up to date:  Ultrasound abdomen ([place]) 10/23/2022: Large focus of heterogeneous echogenicity  within the gallbladder lumen demonstrates vascular flow on Doppler imaging and is concerning for malignancy.    CT abdomen and pelvis with and without contrast, liver protocol ([place]) 10/24/2022: 4-1/2 x 5.5 cm gallbladder mass appears contained within the gallbladder without extension into the adjacent fat or liver.  The liver spleen and pancreas are unremarkable.  Advanced atherosclerosis of the abdominal aorta without aneurysm.  Patent celiac/SMA and CHECO are noted with atherosclerosis.  Dilated appendix at 11 mm with associated inflammatory changes.  No fluid collection or abscess.  No lymphadenopathy appreciated.    Patient presents today for evaluation of this newly identified gallbladder mass.  She states she has been feeling better since she was discharged from the hospital and has just finished her antibiotics.  She continues to have a somewhat poor appetite although this is improving and she has been gaining some weight back.  She denies any fevers or chills, no chest pain, no shortness of breath.  She does walk with a walker due to her arthritis.  She is otherwise having normal bowel movements without issue.  She denies any history of jaundice.  The pain she describes is somewhat vague and located in the right lateral abdomen.  This has been improving since her hospitalization.      Body mass index is 22.49 kg/m².    ECO    Past Medical History:          Past Medical History:   Diagnosis Date    Arthritis     Back pain     Chronic anticoagulation     Fall 2015    Heart block, AV 2018    Status post pacemaker placement.    Hypertension     DELIA (obstructive sleep apnea) 2021    Paroxysmal atrial fibrillation (HCC)     Sepsis (HCC) 2018       Past Surgical History:        Past Surgical History:   Procedure Laterality Date    PACEMAKER INSERTION Left 2018    [facility] Assurity MRI 2274 implanted by Dr. Jones.       Current Medications:   Home Medications    Medication Sig  Taking? Last Dose Authorizing Provider   digoxin (LANOXIN) 125 MCG Tab Take 1 Tablet by mouth every day.   Michael Edmondson M.D.   potassium chloride (MICRO-K) 10 MEQ capsule Take 1 Capsule by mouth every day.   Didi Oro M.D.   zolpidem (AMBIEN) 5 MG Tab Take 2.5 mg by mouth at bedtime as needed.   Physician Outpatient   furosemide (LASIX) 40 MG Tab Take 1 Tablet by mouth every day.   Didi Oro M.D.   dronedarone (MULTAQ) 400 MG Tab Take 1 Tablet by mouth 2 times a day with meals.   Didi Oro M.D.   apixaban (ELIQUIS) 2.5mg Tab Take 1 Tablet by mouth 2 times a day.   Didi Oro M.D.   VITAMIN D PO Take  by mouth. 3 times a week   Physician Outpatient   MELOXICAM PO Take  by mouth.   Physician Outpatient   pantoprazole (PROTONIX) 20 MG tablet Take 20 mg by mouth every day.   Physician Outpatient   allopurinol (ZYLOPRIM) 300 MG Tab Take 300 mg by mouth every day.   Physician Outpatient   spironolactone (ALDACTONE) 25 MG Tab Take 0.5 Tabs by mouth every day.   Alysha INDY Black, A.P.N.   hydrocodone-acetaminophen (NORCO) 7.5-325 MG per tablet Take 1-2 Tabs by mouth every four hours as needed for Severe Pain.   Physician Outpatient                -Anticoagulation: Eliquis       Allergies:         Allergies   Allergen Reactions    Amiodarone      Atrial fibrillation..organ prob's    Sulfa Drugs      Rash, chills, fever    Augmentin Nausea    Ciprofloxacin      Pt states it just didn't agree with her.       Family History:          Family History   Problem Relation Age of Onset    Arthritis Mother     Cancer Mother     Hypertension Mother     Hypertension Father     Heart Disease Father     Hyperlipidemia Father     Lung Disease Sister     Hypertension Sister        Social History:               Social History     Socioeconomic History    Marital status:      Spouse name: Not on file    Number of children: Not on file    Years of education: Not on file    Highest education level:  Not on file   Occupational History    Not on file   Tobacco Use    Smoking status: Former     Packs/day: 0.50     Types: Cigarettes     Quit date: 1989     Years since quittin.8    Smokeless tobacco: Never   Vaping Use    Vaping Use: Never used   Substance and Sexual Activity    Alcohol use: Yes     Comment: occasional glass of wine    Drug use: No    Sexual activity: Not on file   Other Topics Concern    Not on file   Social History Narrative    Not on file     Social Determinants of Health     Financial Resource Strain: Not on file   Food Insecurity: Not on file   Transportation Needs: Not on file   Physical Activity: Not on file   Stress: Not on file   Social Connections: Not on file   Intimate Partner Violence: Not on file   Housing Stability: Not on file       Review of Systems:  Review of Systems - History obtained from the patient    Review of Systems   Constitutional:  Positive for weight loss. Negative for chills, fever and malaise/fatigue.   HENT:  Negative for congestion, ear discharge, ear pain, hearing loss and nosebleeds.    Eyes:  Negative for blurred vision, double vision, photophobia, pain and discharge.   Respiratory:  Negative for cough, hemoptysis and sputum production.    Cardiovascular:  Negative for chest pain, palpitations, orthopnea and claudication.   Gastrointestinal:  Positive for abdominal pain. Negative for constipation, diarrhea, heartburn, nausea and vomiting.   Genitourinary:  Negative for dysuria, frequency, hematuria and urgency.   Musculoskeletal:  Negative for back pain, joint pain, myalgias and neck pain.   Skin:  Negative for itching and rash.   Neurological:  Negative for dizziness, tingling, tremors, sensory change, speech change, focal weakness and headaches.   Endo/Heme/Allergies:  Negative for environmental allergies. Does not bruise/bleed easily.   Psychiatric/Behavioral:  Negative for depression, hallucinations, substance abuse and suicidal ideas. The patient is  not nervous/anxious.      All other ROS negative.      Physical Exam:  /70 (BP Location: Right arm, Patient Position: Sitting, BP Cuff Size: Adult)   Pulse 74   Temp 36.3 °C (97.3 °F) (Temporal)   Wt 59.4 kg (131 lb)   SpO2 95%   BMI 22.49 kg/m²      -General: Patient is cooperative, appears stated age, in no acute distress, AAOx3         -HEENT:  Head is atraumatic, neck supple, no scleral icterus         -Neck: trachea is midline         -Respiratory:  no increased work of breathing, stable on room air   ,     -Cardiovascular: normal peripheral perfusion, paced    -Abdomen: soft, no scars appreciated, mild tenderness to palpation in the right lateral abdomen.  I do not appreciate any organomegaly or masses on exam.    -: Deferred         -MSK/Extremities: atraumatic, normal range of motion and strength, ambulatory          -Integumentary: warm, dry, no obvious skin lesions or rashes appreciated, no jaundice           -Neurologic: alert, speech clear and coherent, functional cognition intact            -Psychiatric:  cooperative, appropriate mood and affect           Physical Exam         Imaging:   Ultrasound abdomen ([place]) 10/23/2022: Large focus of heterogeneous echogenicity within the gallbladder lumen demonstrates vascular flow on Doppler imaging and is concerning for malignancy.    CT abdomen and pelvis with and without contrast, liver protocol ([place]) 10/24/2022: 4-1/2 x 5.5 cm gallbladder mass appears contained within the gallbladder without extension into the adjacent fat or liver.  The liver spleen and pancreas are unremarkable.  Advanced atherosclerosis of the abdominal aorta without aneurysm.  Patent celiac/SMA and CHECO are noted with atherosclerosis.  Dilated appendix at 11 mm with associated inflammatory changes.  No fluid collection or abscess.  No lymphadenopathy appreciated.    Pathology: None    Labs:   Labs from admission at outside hospital on 10/22/2022 reviewed:  CBC: WBC 23.5,  H/H 14.7/42.8, platelets 193  BMP: Largely unremarkable with a creatinine of 1  LFTs: Bilirubin 2.3, albumin 4.2, AST 27, ALT 20, alk phos 111    Assessment and Plan:   Patient is a 89-year-old female with multiple medical problems including heart failure (last EF 35%), chronic atrial fibrillation with a pacemaker in place and on Eliquis, chronic arthritis who presents today for an evaluation of a newly diagnosed gallbladder mass which was identified on admission to her local hospital in [facility] when she presented with signs of early acute appendicitis.    Today in clinic we reviewed her clinical picture and work-up to date.  It seems as if she may have developed early appendicitis which prompted her presentation to the outside hospital but I am suspicious that some of the other symptoms she has been having for multiple months now including weight loss and generalized fatigue and weakness may be more related to the gallbladder mass rather than this episode acute appendicitis.  Her symptoms have been improving with the use of antibiotics however she does still feel as if she is quite weak but her diet has been improving and she has gained some weight back.    We took the time today to review her imaging which I did review with our radiologist here at [facility].  I described to the patient and her family member in detail.  I expressed my concern that this appears to be arising within the gallbladder but it does appear based on its size and appearance to potentially represent malignancy.  I explained that I cannot confirm that just based simply on her imaging but I have a high suspicion based on its size and its appearance.  It appears to be largely intraluminal within the gallbladder without extension into the surrounding liver or surrounding tissue.  Her imaging performed in [facility] and does not on my review reveal any evidence of metastatic disease nor does it appear that she has any  lymphadenopathy.  I did discuss with our radiologist whether or not they felt this might be potentially able to be biopsied however given that its intraluminal felt this would be very difficult to perform.    I did explain to the patient and her family member in detail that this rate may represent a gallbladder cancer.  I also explained that it is important that we perform further work-up to try to confirm what this may represent but should we confirm that this does represent a malignancy I spent time outlining the potential treatment options.  I explained to them that based on the NCCN guidelines, which is what we use to help direct treatment of different malignancies, the standard of care would be upfront surgery if her completed staging did not reveal evidence of metastatic disease.  I did discuss with them that given her overall health and comorbidities she would likely be at a higher surgical risk.  The other aspects of treatment of a malignancy of the gallbladder would potentially include chemotherapy and possibly radiation.  I did outline these for the patient and family today however I explained that we do need to get some further work-up done to confirm that this is malignancy before we can decide on what the next step in treatment would be.    That work-up would include a PET scan and an MRI/MRCP to further characterize this mass and look for any evidence of metastatic disease anywhere else.    Additionally, she is scheduled to follow-up with her cardiologist next week which I explained would be important for her to do given we would need a thorough cardiac evaluation prior to initiating any form of therapy if this were to represent malignancy.    Plan:  - PET scan  - MRI MRCP, patient has pacemaker it is a [facility] and she follows with cardiology here at [facility]  - Labs to include CBC, CMP, hemoglobin A1c, hepatitis panel, CEA, CA 19-9  -Nurse navigation referral  - We will coordinate follow-up once the  above results have come back to discuss what the next step would be    The patient and family asked several great questions which were answered to  their satisfaction.  They  are in agreement with the care plan as outlined above.      Josemanuel Olivares MD  Surgical Oncology  November 4, 2022, 1:06 PM

## 2022-11-08 ENCOUNTER — TELEPHONE (OUTPATIENT)
Dept: SURGICAL ONCOLOGY | Facility: MEDICAL CENTER | Age: 87
End: 2022-11-08
Payer: MEDICARE

## 2022-11-09 ENCOUNTER — TELEPHONE (OUTPATIENT)
Dept: SURGICAL ONCOLOGY | Facility: MEDICAL CENTER | Age: 87
End: 2022-11-09
Payer: MEDICARE

## 2022-11-09 NOTE — TELEPHONE ENCOUNTER
1330 11/09/22  Pt daughter had called in and LVM earlier.   Pt daughter was called and identity was verified. Pt daughter was informed that she does need to have the pt get both the PET scan and the MRI done. Pt daughter verbalized understanding.   Maye CARDONA

## 2022-11-10 ENCOUNTER — TELEMEDICINE (OUTPATIENT)
Dept: CARDIOLOGY | Facility: MEDICAL CENTER | Age: 87
End: 2022-11-10
Payer: MEDICARE

## 2022-11-10 VITALS — HEART RATE: 71 BPM | SYSTOLIC BLOOD PRESSURE: 124 MMHG | DIASTOLIC BLOOD PRESSURE: 66 MMHG

## 2022-11-10 DIAGNOSIS — Z79.01 CHRONIC ANTICOAGULATION: ICD-10-CM

## 2022-11-10 DIAGNOSIS — I44.2 COMPLETE HEART BLOCK (HCC): ICD-10-CM

## 2022-11-10 DIAGNOSIS — I48.0 PAROXYSMAL ATRIAL FIBRILLATION (HCC): ICD-10-CM

## 2022-11-10 DIAGNOSIS — I50.21 ACUTE HFREF (HEART FAILURE WITH REDUCED EJECTION FRACTION) (HCC): ICD-10-CM

## 2022-11-10 DIAGNOSIS — I48.91 HYPERCOAGULABLE STATE DUE TO ATRIAL FIBRILLATION (HCC): ICD-10-CM

## 2022-11-10 DIAGNOSIS — D68.69 HYPERCOAGULABLE STATE DUE TO ATRIAL FIBRILLATION (HCC): ICD-10-CM

## 2022-11-10 DIAGNOSIS — Z95.0 S/P PLACEMENT OF CARDIAC PACEMAKER: ICD-10-CM

## 2022-11-10 DIAGNOSIS — I50.9 NEW ONSET OF CONGESTIVE HEART FAILURE (HCC): ICD-10-CM

## 2022-11-10 DIAGNOSIS — Z79.899 HIGH RISK MEDICATION USE: ICD-10-CM

## 2022-11-10 PROCEDURE — 99215 OFFICE O/P EST HI 40 MIN: CPT | Performed by: INTERNAL MEDICINE

## 2022-11-10 RX ORDER — MELOXICAM 7.5 MG/1
TABLET ORAL
COMMUNITY
Start: 2022-10-12 | End: 2022-11-10

## 2022-11-10 RX ORDER — DOXYCYCLINE HYCLATE 50 MG/1
CAPSULE ORAL
COMMUNITY
Start: 2022-08-25 | End: 2022-11-10

## 2022-11-10 RX ORDER — SPIRONOLACTONE 25 MG/1
12.5 TABLET ORAL DAILY
Qty: 45 TABLET | Refills: 3 | Status: SHIPPED | OUTPATIENT
Start: 2022-11-10 | End: 2023-12-29 | Stop reason: SDUPTHER

## 2022-11-10 RX ORDER — CEFDINIR 300 MG/1
CAPSULE ORAL
COMMUNITY
Start: 2022-10-25 | End: 2023-01-09

## 2022-11-10 RX ORDER — COLCHICINE 0.6 MG/1
0.6 CAPSULE ORAL
COMMUNITY
Start: 2022-09-07 | End: 2023-03-09

## 2022-11-10 RX ORDER — METOPROLOL SUCCINATE 25 MG/1
25 TABLET, EXTENDED RELEASE ORAL DAILY
Qty: 100 TABLET | Refills: 3 | Status: SHIPPED | OUTPATIENT
Start: 2022-11-10 | End: 2023-01-09 | Stop reason: SDUPTHER

## 2022-11-10 RX ORDER — METRONIDAZOLE 500 MG/1
TABLET ORAL
COMMUNITY
Start: 2022-10-25 | End: 2022-11-10

## 2022-11-10 NOTE — PROGRESS NOTES
Cardiology Telemedicine Visit Follow-up Consultation Note    Date of note:    11/10/2022  Primary Care Provider: Cee Ramos M.D.    Name:             Jeny Bennett     YOB: 1933  MRN:               [id number]      This evaluation was conducted via Zoom, using secure and encrypted videoconferencing technology.  The patient was at home in the [facility].  The patient's identity was confirmed and verbal consent for the telemedicine encounter was obtained.       Chief Complaint   Patient presents with    Atrial Fibrillation     F/V Dx: Paroxysmal atrial fibrillation (HCC)                     HISTORY OF PRESENT ILLNESS  Ms. Jeny Bennett is a 89 y.o. female who returns to see us for follow-up     Pertinent History:  Persistent atrial fibrillation: On Multaq 400 mg twice daily.  Metoprolol and digoxin were discontinued due to ongoing fatigue with improvement in symptoms  SSS s/p pacemaker implantation [facility] 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  Was found to have gallbladder mass with plan for surgery in the future based on MR results.    Underwent echocardiogram and would like to discuss results.    Has noted increasing lower extremity edema.  Has ongoing fatigue but no episodes chest pain, pressure or dyspnea.      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    Hypertension     New onset of congestive heart failure (HCC) 11/10/2022    DELIA (obstructive sleep apnea) 7/30/2021    Paroxysmal atrial fibrillation (HCC)     Sepsis (HCC) 5/5/2018         Past Surgical  History:   Procedure Laterality Date    PACEMAKER INSERTION Left 05/07/2018    [facility] [facility] [employer] MRI 2272 implanted by Dr. Jones.         Current Outpatient Medications   Medication Sig Dispense Refill    cefdinir (OMNICEF) 300 MG Cap       MITIGARE 0.6 MG Cap       metoprolol SR (TOPROL XL) 25 MG TABLET SR 24 HR Take 1 Tablet by mouth every day. 100 Tablet 3    spironolactone (ALDACTONE) 25 MG Tab Take 0.5 Tablets by mouth every day. 45 Tablet 3    digoxin (LANOXIN) 125 MCG Tab Take 1 Tablet by mouth every day. 90 Tablet 3    zolpidem (AMBIEN) 5 MG Tab Take 0.5 Tablets by mouth at bedtime as needed.      furosemide (LASIX) 40 MG Tab Take 1 Tablet by mouth every day. 30 Tablet     apixaban (ELIQUIS) 2.5mg Tab Take 1 Tablet by mouth 2 times a day. 180 Tablet 2    VITAMIN D PO Take  by mouth. 3 times a week      pantoprazole (PROTONIX) 20 MG tablet Take 1 Tablet by mouth every day.      allopurinol (ZYLOPRIM) 300 MG Tab Take 1 Tablet by mouth every day.      hydrocodone-acetaminophen (NORCO) 7.5-325 MG per tablet Take 1-2 Tabs by mouth every four hours as needed for Severe Pain.       No current facility-administered medications for this visit.         Allergies   Allergen Reactions    Amiodarone      Atrial fibrillation..organ prob's    Sulfa Drugs      Rash, chills, fever    Augmentin Nausea    Ciprofloxacin      Pt states it just didn't agree with her.         Family History   Problem Relation Age of Onset    Arthritis Mother     Cancer Mother     Hypertension Mother     Hypertension Father     Heart Disease Father     Hyperlipidemia Father     Lung Disease Sister     Hypertension Sister          Social History     Socioeconomic History    Marital status:      Spouse name: Not on file    Number of children: Not on file    Years of education: Not on file    Highest education level: Not on file   Occupational History    Not on file   Tobacco Use    Smoking status: Former     Packs/day: 0.50     Types:  Cigarettes     Quit date: 1989     Years since quittin.8    Smokeless tobacco: Never   Vaping Use    Vaping Use: Never used   Substance and Sexual Activity    Alcohol use: Yes     Comment: occasional glass of wine    Drug use: No    Sexual activity: Not on file   Other Topics Concern    Not on file   Social History Narrative    Not on file     Social Determinants of Health     Financial Resource Strain: Not on file   Food Insecurity: Not on file   Transportation Needs: Not on file   Physical Activity: Not on file   Stress: Not on file   Social Connections: Not on file   Intimate Partner Violence: Not on file   Housing Stability: Not on file         Physical Exam:  Vitals as provided by the patient  /66 (BP Location: Left arm, Patient Position: Sitting, BP Cuff Size: Adult)   Pulse 71    Oxygen Therapy:     BP Readings from Last 4 Encounters:   11/10/22 124/66   22 130/70   22 121/80   22 112/74       Weight/BMI: There is no height or weight on file to calculate BMI.  Wt Readings from Last 4 Encounters:   22 59.4 kg (131 lb)   22 59.4 kg (131 lb)   22 61 kg (134 lb 7.7 oz)   22 60.8 kg (134 lb 0.6 oz)       GEN: Well developed, well nourished and in no acute distress.  Neck:  No JVD noted at 90 degrees, trachea midline  CVS: Pulse as reported by patient, + LE edema.  Resp: Unlabored respiratory effort, no cough or audible wheeze  MSK/Ext: No clubbing or cyanosis visible appreciated.  Skin: No rashes in visible areas.  Psych: A&O x 3, appropriate affect, good judgement, well groomed      Lab Data Review:  Lab Results   Component Value Date/Time    CHOLSTRLTOT 110 2016 01:00 AM    LDL 63 2016 01:00 AM    HDL 37 (A) 2016 01:00 AM    TRIGLYCERIDE 49 2016 01:00 AM       Lab Results   Component Value Date/Time    SODIUM 130 (L) 05/10/2018 03:26 AM    POTASSIUM 5.0 05/10/2018 03:26 AM    CHLORIDE 99 05/10/2018 03:26 AM    CO2 22 05/10/2018  03:26 AM    GLUCOSE 103 (H) 05/10/2018 03:26 AM    BUN 28 (H) 05/10/2018 03:26 AM    CREATININE 0.98 05/10/2018 03:26 AM     Lab Results   Component Value Date/Time    ALKPHOSPHAT 61 05/07/2018 02:27 AM    ASTSGOT 16 05/07/2018 02:27 AM    ALTSGPT 14 05/07/2018 02:27 AM    TBILIRUBIN 0.5 05/07/2018 02:27 AM      Lab Results   Component Value Date/Time    WBC 6.5 05/10/2018 06:04 AM         Cardiac Imaging and Procedures Review:    EKG dated 5/8/2018: My personal interpretation is A. fib/flutter with V paced complexes    Outside Echo dated 6/19/2022:  Moderately reduced left ventricular systolic function with EF 35-40%  Severely dilated left atrium  Mild to moderate MR  Moderate TR  Estimated RVSP 50 mmHg    Echo dated 10/15/2018:   My personal interpretation is normal left ventricular size and function, moderate TR with RVSP consistent with moderate pulmonary hypertension    Device interrogation 6/30/2022:  99.9% AF burden with 26% V pacing.  Battery life 4.6 years        Assessment & Plan     1. Paroxysmal atrial fibrillation (HCC)  metoprolol SR (TOPROL XL) 25 MG TABLET SR 24 HR    NM-CARDIAC PET      2. Chronic anticoagulation        3. S/P placement of cardiac pacemaker        4. Complete heart block (HCC)        5. Hypercoagulable state due to atrial fibrillation (HCC)        6. New onset of congestive heart failure (HCC)  metoprolol SR (TOPROL XL) 25 MG TABLET SR 24 HR    spironolactone (ALDACTONE) 25 MG Tab    NM-CARDIAC PET      7. Acute HFrEF (heart failure with reduced ejection fraction) (HCC)  metoprolol SR (TOPROL XL) 25 MG TABLET SR 24 HR    spironolactone (ALDACTONE) 25 MG Tab    NM-CARDIAC PET      8. High risk medication use  spironolactone (ALDACTONE) 25 MG Tab          Complex medical case with new diagnosis of CHF and gallbladder mass concerning for malignancy in a patient with history of A. fib.    We discussed echocardiogram results which shows moderately reduced left ventricular systolic  function which is a new diagnosis for her.  Her echo from 2018 showed preserved LVEF.  Discussed that Multaq is contraindicated in heart failure, hence, discontinue it.    She is in permanent atrial fibrillation and was previously on metoprolol and digoxin.  Digoxin was restarted due to concerns for heart rate control.  Given new findings of HFrEF, initiate optimal heart failure therapy with Toprol-XL 25 mg daily.    Was previously on spironolactone which was discontinued during hospital stay.  Restart spironolactone 12.5 mg daily given new onset HFrEF and decompensated heart failure.  Is currently taking Lasix 40 mg daily.    High risk medication use with spironolactone.  Obtain repeat BMP in 3 to 4 days to monitor electrolytes and kidney function.  We discussed risk of life-threatening arrhythmias with electrolyte abnormality.    For new onset heart failure, discussed proceeding with coronary angiogram versus nuclear cardiac stress test to evaluate ischemic burden.  After discussion, obtain nuclear cardiac PET scan to rule out obstructive CAD contributing to new onset HFrEF.  She may have to undergo surgery for newly found gallbladder mass I would need preoperative cardiovascular evaluation prior to that.    Continue oral anticoagulation with reduced dose Eliquis 2.5 mg twice daily.        Ms. Bennett's care is highly complex due to high risk diagnosis with either severe exacerbation, progression, or side effects of treatment. We specifically discussed the need for high risk medication requiring at least quarterly testing and/or made a decision on elective/emergent major surgery with identified patient or procedure risk factors specific to Ms. Bennett. I have personally spent extra time in discussion about these facts with Ms. Bennett and reviewed and or ordered at least 3 tests, documents or other physician/MINE reports available including labs, imaging and EKGs as appropriate separate from today's  encounter.  I have reviewed images with Ms. Bennett and personally interpreted on this encounter day the referenced EKG, echocardiogram, stress tests, CT scan, cardiac catheterization or other cardiac imaging and my personal interpretation is what is specifically stated in this note.    All of patient's excellent questions were answered to the best of my knowledge and to her satisfaction.  It was a pleasure seeing Ms. Jeny Bennett in my clinic today. Return in about 4 weeks (around 12/8/2022). Patient is aware to call the cardiology clinic with any questions or concerns.      Michael Edmondson MD  [facility]  [facility], [address]  [address] [address]  Phone: [phone]  Fax: [phone]

## 2022-11-15 ENCOUNTER — TELEPHONE (OUTPATIENT)
Dept: CARDIOLOGY | Facility: MEDICAL CENTER | Age: 87
End: 2022-11-15

## 2022-11-15 NOTE — TELEPHONE ENCOUNTER
"LVM requesting call back to discuss.    \"For new onset heart failure, discussed proceeding with coronary angiogram versus nuclear cardiac stress test to evaluate ischemic burden.  After discussion, obtain nuclear cardiac PET scan to rule out obstructive CAD contributing to new onset HFrEF. \" - DA  "

## 2022-11-15 NOTE — TELEPHONE ENCOUNTER
[facility]    Caller: Liz Case Coordinator for [facility] in [place]    Topic/issue: Needs a call back to discuss the [facility] Pet scan order. She has a few questions. The  In [place] wants to make sure that the staging and evaluation is addressed in the order from [facility].    Callback Number: see comments     Thank you  Jo Ann MEJIA

## 2022-12-06 DIAGNOSIS — F41.9 ANXIETY: ICD-10-CM

## 2022-12-06 RX ORDER — DIAZEPAM 2 MG/1
1 TABLET ORAL ONCE
Qty: 1 TABLET | Refills: 0 | Status: SHIPPED | OUTPATIENT
Start: 2022-12-06 | End: 2022-12-06

## 2022-12-07 ENCOUNTER — HOSPITAL ENCOUNTER (OUTPATIENT)
Dept: RADIOLOGY | Facility: MEDICAL CENTER | Age: 87
End: 2022-12-07
Attending: INTERNAL MEDICINE
Payer: MEDICARE

## 2022-12-07 DIAGNOSIS — I50.9 NEW ONSET OF CONGESTIVE HEART FAILURE (HCC): ICD-10-CM

## 2022-12-07 DIAGNOSIS — I48.0 PAROXYSMAL ATRIAL FIBRILLATION (HCC): ICD-10-CM

## 2022-12-07 DIAGNOSIS — I50.21 ACUTE HFREF (HEART FAILURE WITH REDUCED EJECTION FRACTION) (HCC): ICD-10-CM

## 2022-12-07 PROCEDURE — 78431 MYOCRD IMG PET RST&STRS CT: CPT

## 2022-12-08 ENCOUNTER — OFFICE VISIT (OUTPATIENT)
Dept: SURGICAL ONCOLOGY | Facility: MEDICAL CENTER | Age: 87
End: 2022-12-08
Payer: MEDICARE

## 2022-12-08 ENCOUNTER — HOSPITAL ENCOUNTER (OUTPATIENT)
Dept: RADIOLOGY | Facility: MEDICAL CENTER | Age: 87
End: 2022-12-08
Attending: SURGERY
Payer: MEDICARE

## 2022-12-08 VITALS
BODY MASS INDEX: 21.8 KG/M2 | DIASTOLIC BLOOD PRESSURE: 80 MMHG | WEIGHT: 127 LBS | HEART RATE: 65 BPM | SYSTOLIC BLOOD PRESSURE: 130 MMHG | TEMPERATURE: 97.7 F | OXYGEN SATURATION: 86 %

## 2022-12-08 DIAGNOSIS — K82.8 GALLBLADDER MASS: ICD-10-CM

## 2022-12-08 DIAGNOSIS — C23 GALLBLADDER CANCER (HCC): ICD-10-CM

## 2022-12-08 DIAGNOSIS — Z85.09 H/O CANCER OF GALL BLADDER: ICD-10-CM

## 2022-12-08 PROCEDURE — 93018 CV STRESS TEST I&R ONLY: CPT | Performed by: INTERNAL MEDICINE

## 2022-12-08 PROCEDURE — 99214 OFFICE O/P EST MOD 30 MIN: CPT | Performed by: SURGERY

## 2022-12-08 PROCEDURE — A9552 F18 FDG: HCPCS

## 2022-12-08 PROCEDURE — 78431 MYOCRD IMG PET RST&STRS CT: CPT | Mod: 26 | Performed by: INTERNAL MEDICINE

## 2022-12-08 ASSESSMENT — ENCOUNTER SYMPTOMS
SPUTUM PRODUCTION: 0
EYE PAIN: 0
COUGH: 0
SPEECH CHANGE: 0
NERVOUS/ANXIOUS: 0
HEARTBURN: 0
TINGLING: 0
PHOTOPHOBIA: 0
TREMORS: 0
EYE DISCHARGE: 0
SENSORY CHANGE: 0
BACK PAIN: 0
HALLUCINATIONS: 0
VOMITING: 0
CHILLS: 0
HEMOPTYSIS: 0
MYALGIAS: 0
FOCAL WEAKNESS: 0
ORTHOPNEA: 0
ABDOMINAL PAIN: 0
CLAUDICATION: 0
DIZZINESS: 0
BLURRED VISION: 0
DIARRHEA: 0
DOUBLE VISION: 0
FEVER: 0
NAUSEA: 0
CONSTIPATION: 0
PALPITATIONS: 0
WEIGHT LOSS: 1
DEPRESSION: 0
BRUISES/BLEEDS EASILY: 0
HEADACHES: 0
NECK PAIN: 0

## 2022-12-08 ASSESSMENT — LIFESTYLE VARIABLES: SUBSTANCE_ABUSE: 0

## 2022-12-09 ENCOUNTER — TELEPHONE (OUTPATIENT)
Dept: CARDIOLOGY | Facility: MEDICAL CENTER | Age: 87
End: 2022-12-09
Payer: MEDICARE

## 2022-12-09 ENCOUNTER — HOSPITAL ENCOUNTER (OUTPATIENT)
Dept: RADIOLOGY | Facility: MEDICAL CENTER | Age: 87
End: 2022-12-09
Attending: SURGERY
Payer: MEDICARE

## 2022-12-09 VITALS — OXYGEN SATURATION: 95 % | HEART RATE: 68 BPM

## 2022-12-09 DIAGNOSIS — K82.8 GALLBLADDER MASS: ICD-10-CM

## 2022-12-09 DIAGNOSIS — I36.1 NONRHEUMATIC TRICUSPID VALVE REGURGITATION: ICD-10-CM

## 2022-12-09 PROCEDURE — 74181 MRI ABDOMEN W/O CONTRAST: CPT

## 2022-12-09 NOTE — TELEPHONE ENCOUNTER
Call placed to imaging authorizations to obtain this authorization and send it to [facility].     Spoke to Didi with imaging auth department. She will obtain Auth and fax echo order    ----- Message from Michael Edmondson M.D. sent at 12/8/2022  4:24 PM PST -----  Called and spoke with patient's daughter regarding result.  Can you please order an echocardiogram and fax it to [facility] for repeat echo?  Thanks.

## 2022-12-09 NOTE — PROGRESS NOTES
"MRI NURSING NOTES:    MRI NURSING NOTE:      [facility] present for MRI device setting changes . Pt denies abandoned device(s) &/or lead(s).  Pt educated when to alert MRI tech/Imaging RN. Pt vu.  Pt did not tolerate MRI abd c/s scan.  States she began \"to feel claustrophobic.\"  [facility] pacer set to off per JHONNY Brown.  HR, EKG, BP, pulse ox monitored during scan.     Angel MRI tech, will submit images and let Rad determine if Pt needs to return for further studies.  Pt and dtr, Kathryn, informed if Pt has to return for MRI, try 3T @ [facility] and Pt may tolerate better.  Per [facility], Pt's cardiac device system ok to be in 3T MRI.     Pre-mri settings restored p MRI scan per JHONNY Brown.   Pt and daughter, Kathryn, updated. VU.      [facility] pacer printed report received from JHONNY Brown, will be scanned in chart under \"Media\" tab.      "

## 2022-12-09 NOTE — TELEPHONE ENCOUNTER
----- Message from Michael Edmondson M.D. sent at 12/8/2022  4:24 PM PST -----  Called and spoke with patient's daughter regarding result.  Can you please order an echocardiogram and fax it to [facility] for repeat echo?  Thanks.

## 2022-12-09 NOTE — PROGRESS NOTES
Surgical Oncology History and Physical    Date of Evaluation: 12/8/2022    Reason for Referral: Gallbladder mass    Referred By: Shayy Greer MD ([place], [place])    HPI: Patient is a 89-year-old female with a history of heart failure (last ejection fraction around 35% per chart review), chronic atrial fibrillation with a pacemaker in place and on anticoagulation, arthritis, hypertension who presents for newly identified gallbladder mass.    The patient notes that over the last few months she has generally been not feeling well, has been more tired and fatigued than usual with a somewhat poor appetite.  This prompted presentation to the local hospital in [facility] on 10/22/2022 because her symptoms were getting even worse over the 3 days prior to admission.  The symptoms were mostly weakness and fatigue along with a poor appetite and she had started to become nauseated.  On work-up at the hospital she was noted have a white blood cell count of 23,000 and on CT scan some suggestion of appendicitis without an appendicolith for which she was started on antibiotics.  Additionally there was concern for gallbladder mass noted on her CT scan at that time.  The plan was for her to eventually go to the operating room for an appendectomy but this was delayed due to her anticoagulation and her symptoms began to improve with antibiotic treatment.  An ultrasound was also performed which is described below but was concerning for a intraluminal gallbladder mass with internal vascularization concerning for malignancy.  Given all these findings and her improvement in symptoms the plan was changed from surgery to conservative management with antibiotics.  She was eventually discharged from the hospital around 10/24/2022 and referred to my office for evaluation of this newly diagnosed gallbladder mass.    She was last seen in clinic on 11/2/2022 and was recovering well from her hospitalization.  At that time additional  tests were ordered to further characterize this mass.    Summary of work-up to date:  Ultrasound abdomen ([place]) 10/23/2022: Large focus of heterogeneous echogenicity within the gallbladder lumen demonstrates vascular flow on Doppler imaging and is concerning for malignancy.    CT abdomen and pelvis with and without contrast, liver protocol ([place]) 10/24/2022: 4-1/2 x 5.5 cm gallbladder mass appears contained within the gallbladder without extension into the adjacent fat or liver.  The liver spleen and pancreas are unremarkable.  Advanced atherosclerosis of the abdominal aorta without aneurysm.  Patent celiac/SMA and CEHCO are noted with atherosclerosis.  Dilated appendix at 11 mm with associated inflammatory changes.  No fluid collection or abscess.  No lymphadenopathy appreciated.    PET CT scan ([facility]) 12/8/2022:   1.  The gallbladder has decreased in size since prior study and the internal density now measures fluid density. There is no abnormal uptake to suggest gallbladder malignancy. Previous findings were likely inflammatory/infectious.  2.  Biliary dilatation is probably physiologic related to patient's age.  3.  No abnormal uptake to suggest adenopathy in the chest, abdomen, or pelvis.  4.  Nonspecific probably postinflammatory ovoid opacity left lower lobe just above the diaphragm and nonspecific 4 mm right upper lobe lung nodule, neither which demonstrates abnormal uptake. Depending on patient comorbidities, these could be followed   with noncontrast chest CT in 6 months per Fleischner criteria.  5.  Enlarged heart with a trace of dependent pericardial fluid.  6.  Ascending thoracic aortic aneurysm.    She presents today to discuss her most recent imaging and next steps in care.  She has been doing well since I last saw her.  She denies any ongoing pain.  She does endorse some fatigue and her appetite still hasnt completely returned although she has been eating and maintaining her caloric intake.  She  is with her daughter today      Body mass index is 21.8 kg/m².    ECO    Past Medical History:          Past Medical History:   Diagnosis Date    Arthritis     Back pain     Chronic anticoagulation     Fall 2015    Heart block, AV 2018    Status post pacemaker placement.    Hypertension     New onset of congestive heart failure (HCC) 11/10/2022    DELIA (obstructive sleep apnea) 2021    Paroxysmal atrial fibrillation (HCC)     Sepsis (HCC) 2018       Past Surgical History:        Past Surgical History:   Procedure Laterality Date    PACEMAKER INSERTION Left 2018    [employer] MRI 2272 implanted by Dr. Jones.       Current Medications:   Home Medications    Medication Sig Taking? Last Dose Authorizing Provider   cefdinir (OMNICEF) 300 MG Cap    Physician Outpatient   MITIGARE 0.6 MG Cap    Physician Outpatient   metoprolol SR (TOPROL XL) 25 MG TABLET SR 24 HR Take 1 Tablet by mouth every day.   Michael Edmondson M.D.   spironolactone (ALDACTONE) 25 MG Tab Take 0.5 Tablets by mouth every day.   Michael Edmondson M.D.   digoxin (LANOXIN) 125 MCG Tab Take 1 Tablet by mouth every day.   Michael Edmondson M.D.   zolpidem (AMBIEN) 5 MG Tab Take 0.5 Tablets by mouth at bedtime as needed.   Physician Outpatient   furosemide (LASIX) 40 MG Tab Take 1 Tablet by mouth every day.   Didi Oro M.D.   apixaban (ELIQUIS) 2.5mg Tab Take 1 Tablet by mouth 2 times a day.   Didi Oro M.D.   VITAMIN D PO Take  by mouth. 3 times a week   Physician Outpatient   pantoprazole (PROTONIX) 20 MG tablet Take 1 Tablet by mouth every day.   Physician Outpatient   allopurinol (ZYLOPRIM) 300 MG Tab Take 1 Tablet by mouth every day.   Physician Outpatient   hydrocodone-acetaminophen (NORCO) 7.5-325 MG per tablet Take 1-2 Tabs by mouth every four hours as needed for Severe Pain.   Physician Outpatient                -Anticoagulation: Eliquis       Allergies:         Allergies   Allergen Reactions     Amiodarone      Atrial fibrillation..organ prob's    Sulfa Drugs      Rash, chills, fever    Augmentin Nausea    Ciprofloxacin      Pt states it just didn't agree with her.       Family History:          Family History   Problem Relation Age of Onset    Arthritis Mother     Cancer Mother     Hypertension Mother     Hypertension Father     Heart Disease Father     Hyperlipidemia Father     Lung Disease Sister     Hypertension Sister        Social History:               Social History     Socioeconomic History    Marital status:      Spouse name: Not on file    Number of children: Not on file    Years of education: Not on file    Highest education level: Not on file   Occupational History    Not on file   Tobacco Use    Smoking status: Former     Packs/day: 0.50     Types: Cigarettes     Quit date: 1989     Years since quittin.9    Smokeless tobacco: Never   Vaping Use    Vaping Use: Never used   Substance and Sexual Activity    Alcohol use: Yes     Comment: occasional glass of wine    Drug use: No    Sexual activity: Not on file   Other Topics Concern    Not on file   Social History Narrative    Not on file     Social Determinants of Health     Financial Resource Strain: Not on file   Food Insecurity: Not on file   Transportation Needs: Not on file   Physical Activity: Not on file   Stress: Not on file   Social Connections: Not on file   Intimate Partner Violence: Not on file   Housing Stability: Not on file       Review of Systems:  Review of Systems - History obtained from the patient    Review of Systems   Constitutional:  Positive for weight loss. Negative for chills, fever and malaise/fatigue.   HENT:  Negative for congestion, ear discharge, ear pain, hearing loss and nosebleeds.    Eyes:  Negative for blurred vision, double vision, photophobia, pain and discharge.   Respiratory:  Negative for cough, hemoptysis and sputum production.    Cardiovascular:  Negative for chest pain, palpitations,  orthopnea and claudication.   Gastrointestinal:  Negative for abdominal pain, constipation, diarrhea, heartburn, nausea and vomiting.   Genitourinary:  Negative for dysuria, frequency, hematuria and urgency.   Musculoskeletal:  Negative for back pain, joint pain, myalgias and neck pain.   Skin:  Negative for itching and rash.   Neurological:  Negative for dizziness, tingling, tremors, sensory change, speech change, focal weakness and headaches.   Endo/Heme/Allergies:  Negative for environmental allergies. Does not bruise/bleed easily.   Psychiatric/Behavioral:  Negative for depression, hallucinations, substance abuse and suicidal ideas. The patient is not nervous/anxious.      All other ROS negative.      Physical Exam:  /80 (BP Location: Right arm, Patient Position: Sitting, BP Cuff Size: Adult)   Pulse 65   Temp 36.5 °C (97.7 °F) (Temporal)   Wt 57.6 kg (127 lb)   SpO2 (!) 86%   BMI 21.80 kg/m²      -General: Patient is cooperative, appears stated age, in no acute distress, AAOx3         -HEENT:  Head is atraumatic, neck supple, no scleral icterus         -Neck: trachea is midline         -Respiratory:  no increased work of breathing, stable on room air   ,     -Cardiovascular: normal peripheral perfusion, paced    -Abdomen: soft, no scars appreciated, non-tender  I do not appreciate any organomegaly or masses on exam.    -: Deferred         -MSK/Extremities: atraumatic, normal range of motion and strength, ambulatory          -Integumentary: warm, dry, no obvious skin lesions or rashes appreciated, no jaundice           -Neurologic: alert, speech clear and coherent, functional cognition intact            -Psychiatric:  cooperative, appropriate mood and affect           Physical Exam         Imaging:   Ultrasound abdomen ([place]) 10/23/2022: Large focus of heterogeneous echogenicity within the gallbladder lumen demonstrates vascular flow on Doppler imaging and is concerning for malignancy.    CT  abdomen and pelvis with and without contrast, liver protocol ([place]) 10/24/2022: 4-1/2 x 5.5 cm gallbladder mass appears contained within the gallbladder without extension into the adjacent fat or liver.  The liver spleen and pancreas are unremarkable.  Advanced atherosclerosis of the abdominal aorta without aneurysm.  Patent celiac/SMA and CHECO are noted with atherosclerosis.  Dilated appendix at 11 mm with associated inflammatory changes.  No fluid collection or abscess.  No lymphadenopathy appreciated.    PET CT scan ([facility]) 12/8/2022:   1.  The gallbladder has decreased in size since prior study and the internal density now measures fluid density. There is no abnormal uptake to suggest gallbladder malignancy. Previous findings were likely inflammatory/infectious.  2.  Biliary dilatation is probably physiologic related to patient's age.  3.  No abnormal uptake to suggest adenopathy in the chest, abdomen, or pelvis.  4.  Nonspecific probably postinflammatory ovoid opacity left lower lobe just above the diaphragm and nonspecific 4 mm right upper lobe lung nodule, neither which demonstrates abnormal uptake. Depending on patient comorbidities, these could be followed   with noncontrast chest CT in 6 months per Fleischner criteria.  5.  Enlarged heart with a trace of dependent pericardial fluid.  6.  Ascending thoracic aortic aneurysm.    Pathology: None    Labs:   Labs from admission at outside hospital on 10/22/2022 reviewed:  CBC: WBC 23.5, H/H 14.7/42.8, platelets 193  BMP: Largely unremarkable with a creatinine of 1  LFTs: Bilirubin 2.3, albumin 4.2, AST 27, ALT 20, alk phos 111    Assessment and Plan:   Patient is a 89-year-old female with multiple medical problems including heart failure (last EF 35%), chronic atrial fibrillation with a pacemaker in place and on Eliquis, chronic arthritis who presents today for an evaluation of a newly diagnosed gallbladder mass which was identified on admission to her local  [facility] in [facility] when she presented with signs of early acute appendicitis.    During her last visit a PET scan and MRI were ordered to better characterize this mass.  She had her PET scan done today and has her MRI scheduled tomorrow.  Today in clinic we reviewed her PET scan.  On my review and the final read there does not appear to be any uptake in the gallbladder and the previously seen mass has decreased in size and now measures as fluid density.  It is not clear to me that this is actually a malignancy and quite possibly not even a mass.  I am not sure looking back her prior image what this mass represented.  We will await the MRI to assess for presence of a mass.  I did explain that if this indeed did shrink and it is not PET avid my suspicion for malignancy is low.  If there is still a mass present on MRI then we could consider a laparoscopic cholecystectomy to further assess.  A second option would be to plan for repeat imaging in 3-6 months to assess for any new evidence of a mass.      Plan:  - Await results of MRI  - Pending results of MRI will determine need for lap daphne to assess mass vs repeat imaging to reassess in a few months  -I explained I would contact them next week once I had the results.    The patient and family asked several great questions which were answered to  their satisfaction.  They  are in agreement with the care plan as outlined above.      Josemanuel Olivares MD  December 8, 2022, 9:25 PM

## 2022-12-15 NOTE — TELEPHONE ENCOUNTER
Reviewed 's notes and he is not asking for new lab work. Pt notified.  Tiffanie LINDSAY RN     ----- Message -----  From: Brittny Lynn  Sent: 3/6/2018  11:47 AM  To: Sally Melchor R.N.  Subject: asking about labs and testing for appt           RO/Sally      Patient is asking if she needs to complete any labs/testing prior to her appt. She can be reached at [phone].     Asc Procedure Text (A): After obtaining clear surgical margins the patient was sent to an ASC for surgical repair.  The patient understands they will receive post-surgical care and follow-up from the ASC physician.

## 2022-12-29 ENCOUNTER — NON-PROVIDER VISIT (OUTPATIENT)
Dept: CARDIOLOGY | Facility: MEDICAL CENTER | Age: 87
End: 2022-12-29
Payer: MEDICARE

## 2022-12-29 PROCEDURE — 93294 REM INTERROG EVL PM/LDLS PM: CPT | Performed by: INTERNAL MEDICINE

## 2022-12-29 NOTE — CARDIAC REMOTE MONITOR - SCAN
Device transmission reviewed. Device demonstrated appropriate function.       Electronically Signed by: Steven Willett M.D.    12/30/2022  5:14 PM

## 2022-12-30 ENCOUNTER — TELEPHONE (OUTPATIENT)
Dept: CARDIOLOGY | Facility: MEDICAL CENTER | Age: 87
End: 2022-12-30
Payer: MEDICARE

## 2023-01-09 ENCOUNTER — TELEMEDICINE (OUTPATIENT)
Dept: CARDIOLOGY | Facility: MEDICAL CENTER | Age: 88
End: 2023-01-09
Payer: MEDICARE

## 2023-01-09 VITALS — DIASTOLIC BLOOD PRESSURE: 72 MMHG | SYSTOLIC BLOOD PRESSURE: 130 MMHG | HEART RATE: 70 BPM

## 2023-01-09 DIAGNOSIS — D68.69 HYPERCOAGULABLE STATE DUE TO ATRIAL FIBRILLATION (HCC): ICD-10-CM

## 2023-01-09 DIAGNOSIS — I44.30 AV BLOCK: ICD-10-CM

## 2023-01-09 DIAGNOSIS — Z79.01 CHRONIC ANTICOAGULATION: ICD-10-CM

## 2023-01-09 DIAGNOSIS — I49.5 SICK SINUS SYNDROME (HCC): ICD-10-CM

## 2023-01-09 DIAGNOSIS — I50.22 CHRONIC HFREF (HEART FAILURE WITH REDUCED EJECTION FRACTION) (HCC): ICD-10-CM

## 2023-01-09 DIAGNOSIS — I48.21 PERMANENT ATRIAL FIBRILLATION (HCC): ICD-10-CM

## 2023-01-09 DIAGNOSIS — Z95.0 S/P PLACEMENT OF CARDIAC PACEMAKER: ICD-10-CM

## 2023-01-09 DIAGNOSIS — I48.91 ATRIAL FIBRILLATION WITH RVR (HCC): ICD-10-CM

## 2023-01-09 DIAGNOSIS — Z79.899 HIGH RISK MEDICATION USE: ICD-10-CM

## 2023-01-09 DIAGNOSIS — I48.91 HYPERCOAGULABLE STATE DUE TO ATRIAL FIBRILLATION (HCC): ICD-10-CM

## 2023-01-09 DIAGNOSIS — I50.9 NEW ONSET OF CONGESTIVE HEART FAILURE (HCC): ICD-10-CM

## 2023-01-09 PROCEDURE — 99215 OFFICE O/P EST HI 40 MIN: CPT | Mod: 95 | Performed by: INTERNAL MEDICINE

## 2023-01-09 RX ORDER — LISINOPRIL 2.5 MG/1
2.5 TABLET ORAL DAILY
Qty: 100 TABLET | Refills: 3 | Status: SHIPPED | OUTPATIENT
Start: 2023-01-09 | End: 2023-03-09

## 2023-01-09 RX ORDER — METOPROLOL SUCCINATE 50 MG/1
50 TABLET, EXTENDED RELEASE ORAL DAILY
Qty: 100 TABLET | Refills: 3 | Status: SHIPPED | OUTPATIENT
Start: 2023-01-09 | End: 2024-01-21 | Stop reason: SDUPTHER

## 2023-01-09 NOTE — PROGRESS NOTES
Cardiology Telemedicine Visit Follow-up Consultation Note    Date of note:  1/9/2023  Primary Care Provider: Cee Ramos M.D.    Name:             Jeny Bennett     YOB: 1933  MRN:               [id number]      This evaluation was conducted via Zoom, using secure and encrypted videoconferencing technology.  The patient was at home in the [facility].  The patient's identity was confirmed and verbal consent for the telemedicine encounter was obtained.       Chief Complaint   Patient presents with    Atrial Fibrillation     F/V Dx: Paroxysmal atrial fibrillation (HCC)    Congestive Heart Failure     F/v Dx: Acute HFrEF (heart failure with reduced ejection fraction) (HCC)       HISTORY OF PRESENT ILLNESS  Ms. Jeny Bennett is a 89 y.o. female who returns to see us for follow-up     Pertinent History:  Persistent atrial fibrillation: On Multaq 400 mg twice daily.  Metoprolol and digoxin were discontinued due to ongoing fatigue with improvement in symptoms  SSS s/p pacemaker implantation 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  No concerning findings for malignancy which is reassuring.    Stress test was negative for ischemia as well.    Does report ongoing lower extremity edema which has worsened from before.  Has been more sedentary due to weather conditions.      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    Hypertension     New onset of congestive heart failure (HCC) 11/10/2022    DELIA (obstructive sleep apnea)  7/30/2021    Paroxysmal atrial fibrillation (HCC)     Sepsis (HCC) 5/5/2018         Past Surgical History:   Procedure Laterality Date    PACEMAKER INSERTION Left 05/07/2018    [employer] MRI 2272 implanted by Dr. Jones.         Current Outpatient Medications   Medication Sig Dispense Refill    metoprolol SR (TOPROL XL) 50 MG TABLET SR 24 HR Take 1 Tablet by mouth every day. 100 Tablet 3    lisinopril (PRINIVIL) 2.5 MG Tab Take 1 Tablet by mouth every day. 100 Tablet 3    MITIGARE 0.6 MG Cap       spironolactone (ALDACTONE) 25 MG Tab Take 0.5 Tablets by mouth every day. 45 Tablet 3    digoxin (LANOXIN) 125 MCG Tab Take 1 Tablet by mouth every day. 90 Tablet 3    zolpidem (AMBIEN) 5 MG Tab Take 0.5 Tablets by mouth at bedtime as needed.      furosemide (LASIX) 40 MG Tab Take 1 Tablet by mouth every day. 30 Tablet     apixaban (ELIQUIS) 2.5mg Tab Take 1 Tablet by mouth 2 times a day. 180 Tablet 2    VITAMIN D PO Take  by mouth. 3 times a week      pantoprazole (PROTONIX) 20 MG tablet Take 1 Tablet by mouth every day.      allopurinol (ZYLOPRIM) 300 MG Tab Take 1 Tablet by mouth every day.      hydrocodone-acetaminophen (NORCO) 7.5-325 MG per tablet Take 1-2 Tabs by mouth every four hours as needed for Severe Pain.       No current facility-administered medications for this visit.         Allergies   Allergen Reactions    Amiodarone      Atrial fibrillation..organ prob's    Sulfa Drugs      Rash, chills, fever    Augmentin Nausea    Ciprofloxacin      Pt states it just didn't agree with her.         Family History   Problem Relation Age of Onset    Arthritis Mother     Cancer Mother     Hypertension Mother     Hypertension Father     Heart Disease Father     Hyperlipidemia Father     Lung Disease Sister     Hypertension Sister          Social History     Socioeconomic History    Marital status:      Spouse name: Not on file    Number of children: Not on file    Years of education: Not on file     Highest education level: Not on file   Occupational History    Not on file   Tobacco Use    Smoking status: Former     Packs/day: 0.50     Types: Cigarettes     Quit date: 1989     Years since quittin.0    Smokeless tobacco: Never   Vaping Use    Vaping Use: Never used   Substance and Sexual Activity    Alcohol use: Yes     Comment: occasional glass of wine    Drug use: No    Sexual activity: Not on file   Other Topics Concern    Not on file   Social History Narrative    Not on file     Social Determinants of Health     Financial Resource Strain: Not on file   Food Insecurity: Not on file   Transportation Needs: Not on file   Physical Activity: Not on file   Stress: Not on file   Social Connections: Not on file   Intimate Partner Violence: Not on file   Housing Stability: Not on file         Physical Exam:  Vitals as provided by the patient  /72 (BP Location: Left arm, Patient Position: Sitting, BP Cuff Size: Adult)   Pulse 70    Oxygen Therapy:  Pulse Oximetry:  (Could not obtain. No equipment.)  BP Readings from Last 4 Encounters:   23 130/72   22 130/80   11/10/22 124/66   22 130/70       Weight/BMI: There is no height or weight on file to calculate BMI.  Wt Readings from Last 4 Encounters:   22 57.6 kg (127 lb)   22 59.4 kg (131 lb)   22 59.4 kg (131 lb)   22 61 kg (134 lb 7.7 oz)       GEN: Well developed, well nourished and in no acute distress.  Neck:  No JVD noted at 90 degrees, trachea midline  CVS: Pulse as reported by patient, + LE edema.  Resp: Unlabored respiratory effort, no cough or audible wheeze  MSK/Ext: No clubbing or cyanosis visible appreciated.  Skin: No rashes in visible areas.  Psych: A&O x 3, appropriate affect, good judgement, well groomed      Lab Data Review:  Lab Results   Component Value Date/Time    CHOLSTRLTOT 110 2016 01:00 AM    LDL 63 2016 01:00 AM    HDL 37 (A) 2016 01:00 AM    TRIGLYCERIDE 49 2016  01:00 AM       Lab Results   Component Value Date/Time    SODIUM 130 (L) 05/10/2018 03:26 AM    POTASSIUM 5.0 05/10/2018 03:26 AM    CHLORIDE 99 05/10/2018 03:26 AM    CO2 22 05/10/2018 03:26 AM    GLUCOSE 103 (H) 05/10/2018 03:26 AM    BUN 28 (H) 05/10/2018 03:26 AM    CREATININE 0.98 05/10/2018 03:26 AM     Lab Results   Component Value Date/Time    ALKPHOSPHAT 61 05/07/2018 02:27 AM    ASTSGOT 16 05/07/2018 02:27 AM    ALTSGPT 14 05/07/2018 02:27 AM    TBILIRUBIN 0.5 05/07/2018 02:27 AM      Lab Results   Component Value Date/Time    WBC 6.5 05/10/2018 06:04 AM         Cardiac Imaging and Procedures Review:    EKG dated 5/8/2018: My personal interpretation is A. fib/flutter with V paced complexes    NM cardiac PET 12/7/2022:  No evidence of ischemia or infarction  Rest LVEF 52%, stress LVEF 53%    Outside Echo dated 6/19/2022:  Moderately reduced left ventricular systolic function with EF 35-40%  Severely dilated left atrium  Mild to moderate MR  Moderate TR  Estimated RVSP 50 mmHg    Echo dated 10/15/2018:   My personal interpretation is normal left ventricular size and function, moderate TR with RVSP consistent with moderate pulmonary hypertension    Device interrogation 6/30/2022:  99.9% AF burden with 26% V pacing.  Battery life 4.6 years    Device interrogation 12/29/2022:  99.9% AF burden, 42% V pacing.  2 episodes of RVR  Battery life: 4.4 years      Assessment & Plan     1. Permanent atrial fibrillation (HCC)  metoprolol SR (TOPROL XL) 50 MG TABLET SR 24 HR      2. Chronic anticoagulation        3. S/P placement of cardiac pacemaker        4. AV block        5. Sick sinus syndrome (HCC)        6. Hypercoagulable state due to atrial fibrillation (HCC)        7. New onset of congestive heart failure (HCC)  metoprolol SR (TOPROL XL) 50 MG TABLET SR 24 HR    lisinopril (PRINIVIL) 2.5 MG Tab      8. Chronic HFrEF (heart failure with reduced ejection fraction) (HCC)  metoprolol SR (TOPROL XL) 50 MG TABLET SR 24  HR    lisinopril (PRINIVIL) 2.5 MG Tab      9. High risk medication use  Basic Metabolic Panel      10. Atrial fibrillation with RVR (HCC)  metoprolol SR (TOPROL XL) 50 MG TABLET SR 24 HR          Complex medical history with CHF, gallbladder mass with persistent atrial fibrillation and episodes of RVR.  Has intolerance to amiodarone.  Multaq had to be discontinued with new findings of HFrEF and decompensated heart failure.    For optimal heart failure therapy, increase Toprol-XL to 50 mg daily, continue spironolactone 12.5 mg daily.  Average blood pressure has been around 130s/80s.  Initiate lisinopril 2.5 mg daily with goal blood pressure less than 130/80.    We discussed repeating echocardiogram to monitor systolic function.  She will let us know when she will be coming in town and echocardiogram can be scheduled around her visit.    For persistent atrial fibrillation, continue rate control with Toprol-XL and digoxin 125 mcg daily.  Continue oral anticoagulation with Eliquis 2.5 mg twice daily, no bleeding concerns noted.    She is on several high-risk medications.  Obtain BMP in 3 to 4 days to monitor electrolytes and kidney function.  Is at high risk of life-threatening arrhythmias and digoxin toxicity with electrolyte abnormality or acute kidney injury which has been discussed with the patient.      All of patient's excellent questions were answered to the best of my knowledge and to her satisfaction.  It was a pleasure seeing Ms. Jeny Bennett in my clinic today. Return in about 2 months (around 3/9/2023). Patient is aware to call the cardiology clinic with any questions or concerns.      Michael Edmondson MD  [facility]  [facility], [facility]  [address] [address]  Phone: [phone]  Fax: [phone]

## 2023-01-10 ENCOUNTER — TELEPHONE (OUTPATIENT)
Dept: CARDIOLOGY | Facility: MEDICAL CENTER | Age: 88
End: 2023-01-10
Payer: MEDICARE

## 2023-01-11 NOTE — TELEPHONE ENCOUNTER
PANCHITO    Caller: Jeny Sabrina    Topic/issue: Jeny is calling in regards to her ECHO orders and would like to know if there is somewhere other than [facility] they can be sent to. She states [facility] cannot schedule her ECHO until March and she would prefer not to have it done at [facility]. Please advise.     Callback Number: [phone] (home)     Thank you,  Benita MACHADO

## 2023-01-12 NOTE — TELEPHONE ENCOUNTER
Jeny returned call  Pt reports echo scheduled at [facility] 2/7/23. Thanked pt for her time. Pt verbalized understanding.

## 2023-02-15 ENCOUNTER — HOSPITAL ENCOUNTER (INPATIENT)
Facility: MEDICAL CENTER | Age: 88
LOS: 3 days | DRG: 342 | End: 2023-02-18
Attending: EMERGENCY MEDICINE | Admitting: SURGERY
Payer: MEDICARE

## 2023-02-15 DIAGNOSIS — K35.80 ACUTE APPENDICITIS, UNSPECIFIED ACUTE APPENDICITIS TYPE: ICD-10-CM

## 2023-02-15 LAB
ALBUMIN SERPL BCP-MCNC: 3.8 G/DL (ref 3.2–4.9)
ALBUMIN/GLOB SERPL: 1.3 G/DL
ALP SERPL-CCNC: 65 U/L (ref 30–99)
ALT SERPL-CCNC: 20 U/L (ref 2–50)
ANION GAP SERPL CALC-SCNC: 12 MMOL/L (ref 7–16)
AST SERPL-CCNC: 27 U/L (ref 12–45)
BASOPHILS # BLD AUTO: 0.3 % (ref 0–1.8)
BASOPHILS # BLD: 0.06 K/UL (ref 0–0.12)
BILIRUB SERPL-MCNC: 1.9 MG/DL (ref 0.1–1.5)
BUN SERPL-MCNC: 17 MG/DL (ref 8–22)
CALCIUM ALBUM COR SERPL-MCNC: 9 MG/DL (ref 8.5–10.5)
CALCIUM SERPL-MCNC: 8.8 MG/DL (ref 8.5–10.5)
CHLORIDE SERPL-SCNC: 102 MMOL/L (ref 96–112)
CO2 SERPL-SCNC: 29 MMOL/L (ref 20–33)
CREAT SERPL-MCNC: 0.8 MG/DL (ref 0.5–1.4)
EOSINOPHIL # BLD AUTO: 0 K/UL (ref 0–0.51)
EOSINOPHIL NFR BLD: 0 % (ref 0–6.9)
ERYTHROCYTE [DISTWIDTH] IN BLOOD BY AUTOMATED COUNT: 52.2 FL (ref 35.9–50)
GFR SERPLBLD CREATININE-BSD FMLA CKD-EPI: 70 ML/MIN/1.73 M 2
GLOBULIN SER CALC-MCNC: 3 G/DL (ref 1.9–3.5)
GLUCOSE SERPL-MCNC: 103 MG/DL (ref 65–99)
HCT VFR BLD AUTO: 41.6 % (ref 37–47)
HGB BLD-MCNC: 14 G/DL (ref 12–16)
IMM GRANULOCYTES # BLD AUTO: 0.16 K/UL (ref 0–0.11)
IMM GRANULOCYTES NFR BLD AUTO: 0.8 % (ref 0–0.9)
LIPASE SERPL-CCNC: 11 U/L (ref 11–82)
LYMPHOCYTES # BLD AUTO: 1.94 K/UL (ref 1–4.8)
LYMPHOCYTES NFR BLD: 9.7 % (ref 22–41)
MCH RBC QN AUTO: 31.5 PG (ref 27–33)
MCHC RBC AUTO-ENTMCNC: 33.7 G/DL (ref 33.6–35)
MCV RBC AUTO: 93.7 FL (ref 81.4–97.8)
MONOCYTES # BLD AUTO: 0.98 K/UL (ref 0–0.85)
MONOCYTES NFR BLD AUTO: 4.9 % (ref 0–13.4)
NEUTROPHILS # BLD AUTO: 16.9 K/UL (ref 2–7.15)
NEUTROPHILS NFR BLD: 84.3 % (ref 44–72)
NRBC # BLD AUTO: 0 K/UL
NRBC BLD-RTO: 0 /100 WBC
PLATELET # BLD AUTO: 134 K/UL (ref 164–446)
PMV BLD AUTO: 9.2 FL (ref 9–12.9)
POTASSIUM SERPL-SCNC: 4 MMOL/L (ref 3.6–5.5)
PROT SERPL-MCNC: 6.8 G/DL (ref 6–8.2)
RBC # BLD AUTO: 4.44 M/UL (ref 4.2–5.4)
SODIUM SERPL-SCNC: 143 MMOL/L (ref 135–145)
WBC # BLD AUTO: 20 K/UL (ref 4.8–10.8)

## 2023-02-15 PROCEDURE — 36415 COLL VENOUS BLD VENIPUNCTURE: CPT

## 2023-02-15 PROCEDURE — 83690 ASSAY OF LIPASE: CPT

## 2023-02-15 PROCEDURE — 99291 CRITICAL CARE FIRST HOUR: CPT

## 2023-02-15 PROCEDURE — 93005 ELECTROCARDIOGRAM TRACING: CPT | Performed by: SURGERY

## 2023-02-15 PROCEDURE — 700102 HCHG RX REV CODE 250 W/ 637 OVERRIDE(OP): Performed by: SURGERY

## 2023-02-15 PROCEDURE — 99223 1ST HOSP IP/OBS HIGH 75: CPT | Performed by: SURGERY

## 2023-02-15 PROCEDURE — 770001 HCHG ROOM/CARE - MED/SURG/GYN PRIV*

## 2023-02-15 PROCEDURE — 700111 HCHG RX REV CODE 636 W/ 250 OVERRIDE (IP): Performed by: EMERGENCY MEDICINE

## 2023-02-15 PROCEDURE — 80053 COMPREHEN METABOLIC PANEL: CPT

## 2023-02-15 PROCEDURE — 85025 COMPLETE CBC W/AUTO DIFF WBC: CPT

## 2023-02-15 PROCEDURE — 96374 THER/PROPH/DIAG INJ IV PUSH: CPT

## 2023-02-15 PROCEDURE — 700105 HCHG RX REV CODE 258: Performed by: SURGERY

## 2023-02-15 PROCEDURE — A9270 NON-COVERED ITEM OR SERVICE: HCPCS | Performed by: SURGERY

## 2023-02-15 RX ORDER — SPIRONOLACTONE 25 MG/1
12.5 TABLET ORAL DAILY
Status: DISCONTINUED | OUTPATIENT
Start: 2023-02-16 | End: 2023-02-18 | Stop reason: HOSPADM

## 2023-02-15 RX ORDER — OXYCODONE HYDROCHLORIDE 5 MG/1
2.5 TABLET ORAL
Status: DISCONTINUED | OUTPATIENT
Start: 2023-02-15 | End: 2023-02-18 | Stop reason: HOSPADM

## 2023-02-15 RX ORDER — DIGOXIN 250 MCG
125 TABLET ORAL EVERY EVENING
Status: DISCONTINUED | OUTPATIENT
Start: 2023-02-16 | End: 2023-02-18 | Stop reason: HOSPADM

## 2023-02-15 RX ORDER — BISACODYL 10 MG
10 SUPPOSITORY, RECTAL RECTAL
Status: DISCONTINUED | OUTPATIENT
Start: 2023-02-15 | End: 2023-02-18 | Stop reason: HOSPADM

## 2023-02-15 RX ORDER — SODIUM CHLORIDE, SODIUM LACTATE, POTASSIUM CHLORIDE, CALCIUM CHLORIDE 600; 310; 30; 20 MG/100ML; MG/100ML; MG/100ML; MG/100ML
INJECTION, SOLUTION INTRAVENOUS CONTINUOUS
Status: DISCONTINUED | OUTPATIENT
Start: 2023-02-15 | End: 2023-02-17

## 2023-02-15 RX ORDER — ENEMA 19; 7 G/133ML; G/133ML
1 ENEMA RECTAL
Status: DISCONTINUED | OUTPATIENT
Start: 2023-02-15 | End: 2023-02-18 | Stop reason: HOSPADM

## 2023-02-15 RX ORDER — ACETAMINOPHEN 325 MG/1
650 TABLET ORAL EVERY 6 HOURS
Status: DISCONTINUED | OUTPATIENT
Start: 2023-02-16 | End: 2023-02-18 | Stop reason: HOSPADM

## 2023-02-15 RX ORDER — COLCHICINE 0.6 MG/1
0.6 TABLET ORAL DAILY
Status: DISCONTINUED | OUTPATIENT
Start: 2023-02-16 | End: 2023-02-18 | Stop reason: HOSPADM

## 2023-02-15 RX ORDER — POLYETHYLENE GLYCOL 3350 17 G/17G
1 POWDER, FOR SOLUTION ORAL 2 TIMES DAILY
Status: DISCONTINUED | OUTPATIENT
Start: 2023-02-15 | End: 2023-02-18 | Stop reason: HOSPADM

## 2023-02-15 RX ORDER — LISINOPRIL 5 MG/1
2.5 TABLET ORAL DAILY
Status: DISCONTINUED | OUTPATIENT
Start: 2023-02-16 | End: 2023-02-18 | Stop reason: HOSPADM

## 2023-02-15 RX ORDER — OXYCODONE HYDROCHLORIDE 5 MG/1
5 TABLET ORAL
Status: DISCONTINUED | OUTPATIENT
Start: 2023-02-15 | End: 2023-02-18 | Stop reason: HOSPADM

## 2023-02-15 RX ORDER — AMOXICILLIN 250 MG
1 CAPSULE ORAL
Status: DISCONTINUED | OUTPATIENT
Start: 2023-02-15 | End: 2023-02-18 | Stop reason: HOSPADM

## 2023-02-15 RX ORDER — ONDANSETRON 2 MG/ML
4 INJECTION INTRAMUSCULAR; INTRAVENOUS EVERY 4 HOURS PRN
Status: DISCONTINUED | OUTPATIENT
Start: 2023-02-15 | End: 2023-02-18 | Stop reason: HOSPADM

## 2023-02-15 RX ORDER — FUROSEMIDE 20 MG/1
40 TABLET ORAL DAILY
Status: DISCONTINUED | OUTPATIENT
Start: 2023-02-16 | End: 2023-02-18 | Stop reason: HOSPADM

## 2023-02-15 RX ORDER — AMOXICILLIN 250 MG
1 CAPSULE ORAL NIGHTLY
Status: DISCONTINUED | OUTPATIENT
Start: 2023-02-15 | End: 2023-02-18 | Stop reason: HOSPADM

## 2023-02-15 RX ORDER — ACETAMINOPHEN 325 MG/1
650 TABLET ORAL EVERY 6 HOURS PRN
Status: DISCONTINUED | OUTPATIENT
Start: 2023-02-21 | End: 2023-02-18 | Stop reason: HOSPADM

## 2023-02-15 RX ORDER — ONDANSETRON 4 MG/1
4 TABLET, ORALLY DISINTEGRATING ORAL EVERY 4 HOURS PRN
Status: DISCONTINUED | OUTPATIENT
Start: 2023-02-15 | End: 2023-02-18 | Stop reason: HOSPADM

## 2023-02-15 RX ORDER — OMEPRAZOLE 20 MG/1
20 CAPSULE, DELAYED RELEASE ORAL DAILY
Status: DISCONTINUED | OUTPATIENT
Start: 2023-02-16 | End: 2023-02-18 | Stop reason: HOSPADM

## 2023-02-15 RX ORDER — METOPROLOL SUCCINATE 50 MG/1
50 TABLET, EXTENDED RELEASE ORAL DAILY
Status: DISCONTINUED | OUTPATIENT
Start: 2023-02-16 | End: 2023-02-18 | Stop reason: HOSPADM

## 2023-02-15 RX ORDER — ZOLPIDEM TARTRATE 5 MG/1
2.5 TABLET ORAL
Status: DISCONTINUED | OUTPATIENT
Start: 2023-02-15 | End: 2023-02-18 | Stop reason: HOSPADM

## 2023-02-15 RX ORDER — MORPHINE SULFATE 4 MG/ML
2 INJECTION INTRAVENOUS ONCE
Status: COMPLETED | OUTPATIENT
Start: 2023-02-15 | End: 2023-02-15

## 2023-02-15 RX ORDER — DOCUSATE SODIUM 100 MG/1
100 CAPSULE, LIQUID FILLED ORAL 2 TIMES DAILY
Status: DISCONTINUED | OUTPATIENT
Start: 2023-02-15 | End: 2023-02-18 | Stop reason: HOSPADM

## 2023-02-15 RX ORDER — ALLOPURINOL 300 MG/1
300 TABLET ORAL DAILY
Status: DISCONTINUED | OUTPATIENT
Start: 2023-02-16 | End: 2023-02-18 | Stop reason: HOSPADM

## 2023-02-15 RX ADMIN — DOCUSATE SODIUM 100 MG: 100 CAPSULE, LIQUID FILLED ORAL at 21:56

## 2023-02-15 RX ADMIN — POLYETHYLENE GLYCOL 3350 1 PACKET: 17 POWDER, FOR SOLUTION ORAL at 21:30

## 2023-02-15 RX ADMIN — MORPHINE SULFATE 2 MG: 4 INJECTION INTRAVENOUS at 20:34

## 2023-02-15 RX ADMIN — SENNOSIDES AND DOCUSATE SODIUM 1 TABLET: 50; 8.6 TABLET ORAL at 21:56

## 2023-02-15 RX ADMIN — SODIUM CHLORIDE, POTASSIUM CHLORIDE, SODIUM LACTATE AND CALCIUM CHLORIDE: 600; 310; 30; 20 INJECTION, SOLUTION INTRAVENOUS at 22:27

## 2023-02-15 RX ADMIN — ZOLPIDEM TARTRATE 2.5 MG: 5 TABLET ORAL at 21:55

## 2023-02-16 ENCOUNTER — ANESTHESIA EVENT (OUTPATIENT)
Dept: SURGERY | Facility: MEDICAL CENTER | Age: 88
DRG: 342 | End: 2023-02-16
Payer: MEDICARE

## 2023-02-16 ENCOUNTER — ANESTHESIA (OUTPATIENT)
Dept: SURGERY | Facility: MEDICAL CENTER | Age: 88
DRG: 342 | End: 2023-02-16
Payer: MEDICARE

## 2023-02-16 LAB
ANION GAP SERPL CALC-SCNC: 10 MMOL/L (ref 7–16)
APPEARANCE UR: CLEAR
BACTERIA #/AREA URNS HPF: NEGATIVE /HPF
BASOPHILS # BLD AUTO: 0.3 % (ref 0–1.8)
BASOPHILS # BLD: 0.04 K/UL (ref 0–0.12)
BILIRUB UR QL STRIP.AUTO: NEGATIVE
BUN SERPL-MCNC: 17 MG/DL (ref 8–22)
CALCIUM SERPL-MCNC: 8.6 MG/DL (ref 8.5–10.5)
CHLORIDE SERPL-SCNC: 103 MMOL/L (ref 96–112)
CO2 SERPL-SCNC: 27 MMOL/L (ref 20–33)
COLOR UR: ABNORMAL
CREAT SERPL-MCNC: 0.71 MG/DL (ref 0.5–1.4)
EKG IMPRESSION: NORMAL
EOSINOPHIL # BLD AUTO: 0.02 K/UL (ref 0–0.51)
EOSINOPHIL NFR BLD: 0.1 % (ref 0–6.9)
EPI CELLS #/AREA URNS HPF: ABNORMAL /HPF
ERYTHROCYTE [DISTWIDTH] IN BLOOD BY AUTOMATED COUNT: 51.9 FL (ref 35.9–50)
GFR SERPLBLD CREATININE-BSD FMLA CKD-EPI: 81 ML/MIN/1.73 M 2
GLUCOSE SERPL-MCNC: 93 MG/DL (ref 65–99)
GLUCOSE UR STRIP.AUTO-MCNC: NEGATIVE MG/DL
HCT VFR BLD AUTO: 37.8 % (ref 37–47)
HGB BLD-MCNC: 12.7 G/DL (ref 12–16)
HYALINE CASTS #/AREA URNS LPF: ABNORMAL /LPF
IMM GRANULOCYTES # BLD AUTO: 0.12 K/UL (ref 0–0.11)
IMM GRANULOCYTES NFR BLD AUTO: 0.8 % (ref 0–0.9)
KETONES UR STRIP.AUTO-MCNC: NEGATIVE MG/DL
LEUKOCYTE ESTERASE UR QL STRIP.AUTO: NEGATIVE
LYMPHOCYTES # BLD AUTO: 1.55 K/UL (ref 1–4.8)
LYMPHOCYTES NFR BLD: 10.1 % (ref 22–41)
MAGNESIUM SERPL-MCNC: 1.8 MG/DL (ref 1.5–2.5)
MCH RBC QN AUTO: 31.3 PG (ref 27–33)
MCHC RBC AUTO-ENTMCNC: 33.6 G/DL (ref 33.6–35)
MCV RBC AUTO: 93.1 FL (ref 81.4–97.8)
MICRO URNS: ABNORMAL
MONOCYTES # BLD AUTO: 0.81 K/UL (ref 0–0.85)
MONOCYTES NFR BLD AUTO: 5.3 % (ref 0–13.4)
NEUTROPHILS # BLD AUTO: 12.77 K/UL (ref 2–7.15)
NEUTROPHILS NFR BLD: 83.4 % (ref 44–72)
NITRITE UR QL STRIP.AUTO: NEGATIVE
NRBC # BLD AUTO: 0 K/UL
NRBC BLD-RTO: 0 /100 WBC
PATHOLOGY CONSULT NOTE: NORMAL
PH UR STRIP.AUTO: 7 [PH] (ref 5–8)
PHOSPHATE SERPL-MCNC: 3.1 MG/DL (ref 2.5–4.5)
PLATELET # BLD AUTO: 115 K/UL (ref 164–446)
PMV BLD AUTO: 9.3 FL (ref 9–12.9)
POTASSIUM SERPL-SCNC: 3.6 MMOL/L (ref 3.6–5.5)
PROT UR QL STRIP: 100 MG/DL
RBC # BLD AUTO: 4.06 M/UL (ref 4.2–5.4)
RBC # URNS HPF: ABNORMAL /HPF
RBC UR QL AUTO: ABNORMAL
SODIUM SERPL-SCNC: 140 MMOL/L (ref 135–145)
SP GR UR STRIP.AUTO: >=1.045
UROBILINOGEN UR STRIP.AUTO-MCNC: 1 MG/DL
WBC # BLD AUTO: 15.3 K/UL (ref 4.8–10.8)
WBC #/AREA URNS HPF: ABNORMAL /HPF

## 2023-02-16 PROCEDURE — 700111 HCHG RX REV CODE 636 W/ 250 OVERRIDE (IP): Performed by: ANESTHESIOLOGY

## 2023-02-16 PROCEDURE — 85025 COMPLETE CBC W/AUTO DIFF WBC: CPT

## 2023-02-16 PROCEDURE — 83735 ASSAY OF MAGNESIUM: CPT

## 2023-02-16 PROCEDURE — 160035 HCHG PACU - 1ST 60 MINS PHASE I: Performed by: SURGERY

## 2023-02-16 PROCEDURE — 0DTJ4ZZ RESECTION OF APPENDIX, PERCUTANEOUS ENDOSCOPIC APPROACH: ICD-10-PCS | Performed by: SURGERY

## 2023-02-16 PROCEDURE — 770001 HCHG ROOM/CARE - MED/SURG/GYN PRIV*

## 2023-02-16 PROCEDURE — 700101 HCHG RX REV CODE 250: Performed by: ANESTHESIOLOGY

## 2023-02-16 PROCEDURE — 36415 COLL VENOUS BLD VENIPUNCTURE: CPT

## 2023-02-16 PROCEDURE — A9270 NON-COVERED ITEM OR SERVICE: HCPCS | Performed by: ANESTHESIOLOGY

## 2023-02-16 PROCEDURE — 80048 BASIC METABOLIC PNL TOTAL CA: CPT

## 2023-02-16 PROCEDURE — A9270 NON-COVERED ITEM OR SERVICE: HCPCS | Performed by: SURGERY

## 2023-02-16 PROCEDURE — 700102 HCHG RX REV CODE 250 W/ 637 OVERRIDE(OP): Performed by: ANESTHESIOLOGY

## 2023-02-16 PROCEDURE — 00840 ANES IPER PX LOWER ABD NOS: CPT | Performed by: ANESTHESIOLOGY

## 2023-02-16 PROCEDURE — 84100 ASSAY OF PHOSPHORUS: CPT

## 2023-02-16 PROCEDURE — 44970 LAPAROSCOPY APPENDECTOMY: CPT | Performed by: SURGERY

## 2023-02-16 PROCEDURE — 700102 HCHG RX REV CODE 250 W/ 637 OVERRIDE(OP): Performed by: SURGERY

## 2023-02-16 PROCEDURE — 160002 HCHG RECOVERY MINUTES (STAT): Performed by: SURGERY

## 2023-02-16 PROCEDURE — 99100 ANES PT EXTEME AGE<1 YR&>70: CPT | Performed by: ANESTHESIOLOGY

## 2023-02-16 PROCEDURE — 160048 HCHG OR STATISTICAL LEVEL 1-5: Performed by: SURGERY

## 2023-02-16 PROCEDURE — 700101 HCHG RX REV CODE 250: Performed by: SURGERY

## 2023-02-16 PROCEDURE — 0FJ44ZZ INSPECTION OF GALLBLADDER, PERCUTANEOUS ENDOSCOPIC APPROACH: ICD-10-PCS | Performed by: SURGERY

## 2023-02-16 PROCEDURE — 700111 HCHG RX REV CODE 636 W/ 250 OVERRIDE (IP): Performed by: SURGERY

## 2023-02-16 PROCEDURE — 160036 HCHG PACU - EA ADDL 30 MINS PHASE I: Performed by: SURGERY

## 2023-02-16 PROCEDURE — 160009 HCHG ANES TIME/MIN: Performed by: SURGERY

## 2023-02-16 PROCEDURE — 160041 HCHG SURGERY MINUTES - EA ADDL 1 MIN LEVEL 4: Performed by: SURGERY

## 2023-02-16 PROCEDURE — 81001 URINALYSIS AUTO W/SCOPE: CPT

## 2023-02-16 PROCEDURE — 88304 TISSUE EXAM BY PATHOLOGIST: CPT

## 2023-02-16 PROCEDURE — 160029 HCHG SURGERY MINUTES - 1ST 30 MINS LEVEL 4: Performed by: SURGERY

## 2023-02-16 RX ORDER — DEXAMETHASONE SODIUM PHOSPHATE 4 MG/ML
INJECTION, SOLUTION INTRA-ARTICULAR; INTRALESIONAL; INTRAMUSCULAR; INTRAVENOUS; SOFT TISSUE PRN
Status: DISCONTINUED | OUTPATIENT
Start: 2023-02-16 | End: 2023-02-16 | Stop reason: SURG

## 2023-02-16 RX ORDER — DIPHENHYDRAMINE HYDROCHLORIDE 50 MG/ML
12.5 INJECTION INTRAMUSCULAR; INTRAVENOUS
Status: DISCONTINUED | OUTPATIENT
Start: 2023-02-16 | End: 2023-02-16 | Stop reason: HOSPADM

## 2023-02-16 RX ORDER — HYDROMORPHONE HYDROCHLORIDE 1 MG/ML
0.4 INJECTION, SOLUTION INTRAMUSCULAR; INTRAVENOUS; SUBCUTANEOUS
Status: DISCONTINUED | OUTPATIENT
Start: 2023-02-16 | End: 2023-02-16 | Stop reason: HOSPADM

## 2023-02-16 RX ORDER — HYDROMORPHONE HYDROCHLORIDE 1 MG/ML
0.2 INJECTION, SOLUTION INTRAMUSCULAR; INTRAVENOUS; SUBCUTANEOUS
Status: DISCONTINUED | OUTPATIENT
Start: 2023-02-16 | End: 2023-02-16 | Stop reason: HOSPADM

## 2023-02-16 RX ORDER — LIDOCAINE HYDROCHLORIDE 20 MG/ML
INJECTION, SOLUTION EPIDURAL; INFILTRATION; INTRACAUDAL; PERINEURAL PRN
Status: DISCONTINUED | OUTPATIENT
Start: 2023-02-16 | End: 2023-02-16 | Stop reason: SURG

## 2023-02-16 RX ORDER — ONDANSETRON 2 MG/ML
INJECTION INTRAMUSCULAR; INTRAVENOUS PRN
Status: DISCONTINUED | OUTPATIENT
Start: 2023-02-16 | End: 2023-02-16 | Stop reason: SURG

## 2023-02-16 RX ORDER — HALOPERIDOL 5 MG/ML
1 INJECTION INTRAMUSCULAR
Status: DISCONTINUED | OUTPATIENT
Start: 2023-02-16 | End: 2023-02-16 | Stop reason: HOSPADM

## 2023-02-16 RX ORDER — HYDROMORPHONE HYDROCHLORIDE 1 MG/ML
0.1 INJECTION, SOLUTION INTRAMUSCULAR; INTRAVENOUS; SUBCUTANEOUS
Status: DISCONTINUED | OUTPATIENT
Start: 2023-02-16 | End: 2023-02-16 | Stop reason: HOSPADM

## 2023-02-16 RX ORDER — BUPIVACAINE HYDROCHLORIDE AND EPINEPHRINE 5; 5 MG/ML; UG/ML
INJECTION, SOLUTION EPIDURAL; INTRACAUDAL; PERINEURAL
Status: DISCONTINUED | OUTPATIENT
Start: 2023-02-16 | End: 2023-02-16 | Stop reason: HOSPADM

## 2023-02-16 RX ORDER — MEPERIDINE HYDROCHLORIDE 25 MG/ML
12.5 INJECTION INTRAMUSCULAR; INTRAVENOUS; SUBCUTANEOUS
Status: DISCONTINUED | OUTPATIENT
Start: 2023-02-16 | End: 2023-02-16 | Stop reason: HOSPADM

## 2023-02-16 RX ORDER — OXYCODONE HCL 5 MG/5 ML
10 SOLUTION, ORAL ORAL
Status: COMPLETED | OUTPATIENT
Start: 2023-02-16 | End: 2023-02-16

## 2023-02-16 RX ORDER — ONDANSETRON 2 MG/ML
4 INJECTION INTRAMUSCULAR; INTRAVENOUS
Status: DISCONTINUED | OUTPATIENT
Start: 2023-02-16 | End: 2023-02-16 | Stop reason: HOSPADM

## 2023-02-16 RX ORDER — OXYCODONE HCL 5 MG/5 ML
5 SOLUTION, ORAL ORAL
Status: COMPLETED | OUTPATIENT
Start: 2023-02-16 | End: 2023-02-16

## 2023-02-16 RX ADMIN — COLCHICINE 0.6 MG: 0.6 TABLET, FILM COATED ORAL at 05:57

## 2023-02-16 RX ADMIN — ONDANSETRON 4 MG: 2 INJECTION INTRAMUSCULAR; INTRAVENOUS at 21:27

## 2023-02-16 RX ADMIN — OXYCODONE HYDROCHLORIDE 2.5 MG: 5 SOLUTION ORAL at 12:08

## 2023-02-16 RX ADMIN — FENTANYL CITRATE 25 MCG: 50 INJECTION, SOLUTION INTRAMUSCULAR; INTRAVENOUS at 12:14

## 2023-02-16 RX ADMIN — PROPOFOL 130 MG: 10 INJECTION, EMULSION INTRAVENOUS at 11:01

## 2023-02-16 RX ADMIN — SUGAMMADEX 200 MG: 100 INJECTION, SOLUTION INTRAVENOUS at 11:39

## 2023-02-16 RX ADMIN — FENTANYL CITRATE 100 MCG: 50 INJECTION, SOLUTION INTRAMUSCULAR; INTRAVENOUS at 11:19

## 2023-02-16 RX ADMIN — DEXAMETHASONE SODIUM PHOSPHATE 4 MG: 4 INJECTION, SOLUTION INTRA-ARTICULAR; INTRALESIONAL; INTRAMUSCULAR; INTRAVENOUS; SOFT TISSUE at 11:01

## 2023-02-16 RX ADMIN — DIGOXIN 125 MCG: 0.25 TABLET ORAL at 17:15

## 2023-02-16 RX ADMIN — FUROSEMIDE 40 MG: 40 TABLET ORAL at 05:57

## 2023-02-16 RX ADMIN — ONDANSETRON 4 MG: 2 INJECTION INTRAMUSCULAR; INTRAVENOUS at 11:01

## 2023-02-16 RX ADMIN — ROCURONIUM BROMIDE 50 MG: 10 INJECTION, SOLUTION INTRAVENOUS at 11:01

## 2023-02-16 RX ADMIN — METOPROLOL SUCCINATE 50 MG: 50 TABLET, EXTENDED RELEASE ORAL at 05:57

## 2023-02-16 RX ADMIN — MAGNESIUM HYDROXIDE 30 ML: 400 SUSPENSION ORAL at 05:59

## 2023-02-16 RX ADMIN — ALLOPURINOL 300 MG: 300 TABLET ORAL at 05:57

## 2023-02-16 RX ADMIN — OMEPRAZOLE 20 MG: 20 CAPSULE, DELAYED RELEASE ORAL at 05:59

## 2023-02-16 RX ADMIN — ZOLPIDEM TARTRATE 2.5 MG: 5 TABLET ORAL at 19:56

## 2023-02-16 RX ADMIN — SPIRONOLACTONE 12.5 MG: 25 TABLET ORAL at 05:58

## 2023-02-16 RX ADMIN — ACETAMINOPHEN 650 MG: 325 TABLET, FILM COATED ORAL at 23:02

## 2023-02-16 RX ADMIN — ACETAMINOPHEN 650 MG: 325 TABLET, FILM COATED ORAL at 17:15

## 2023-02-16 RX ADMIN — POLYETHYLENE GLYCOL 3350 1 PACKET: 17 POWDER, FOR SOLUTION ORAL at 06:09

## 2023-02-16 RX ADMIN — LIDOCAINE HYDROCHLORIDE 100 MG: 20 INJECTION, SOLUTION EPIDURAL; INFILTRATION; INTRACAUDAL at 11:01

## 2023-02-16 RX ADMIN — ACETAMINOPHEN 650 MG: 325 TABLET, FILM COATED ORAL at 05:58

## 2023-02-16 RX ADMIN — OXYCODONE 2.5 MG: 5 TABLET ORAL at 20:01

## 2023-02-16 RX ADMIN — DOCUSATE SODIUM 100 MG: 100 CAPSULE, LIQUID FILLED ORAL at 05:58

## 2023-02-16 ASSESSMENT — COPD QUESTIONNAIRES
COPD SCREENING SCORE: 6
DO YOU EVER COUGH UP ANY MUCUS OR PHLEGM?: YES, A FEW DAYS A WEEK OR MONTH
DURING THE PAST 4 WEEKS HOW MUCH DID YOU FEEL SHORT OF BREATH: SOME OF THE TIME
HAVE YOU SMOKED AT LEAST 100 CIGARETTES IN YOUR ENTIRE LIFE: YES

## 2023-02-16 ASSESSMENT — COGNITIVE AND FUNCTIONAL STATUS - GENERAL
TURNING FROM BACK TO SIDE WHILE IN FLAT BAD: A LITTLE
MOVING FROM LYING ON BACK TO SITTING ON SIDE OF FLAT BED: A LITTLE
STANDING UP FROM CHAIR USING ARMS: A LITTLE
MOVING TO AND FROM BED TO CHAIR: A LITTLE
SUGGESTED CMS G CODE MODIFIER DAILY ACTIVITY: CK
DRESSING REGULAR UPPER BODY CLOTHING: A LITTLE
TOILETING: A LITTLE
DRESSING REGULAR LOWER BODY CLOTHING: A LITTLE
HELP NEEDED FOR BATHING: A LITTLE
CLIMB 3 TO 5 STEPS WITH RAILING: A LOT
WALKING IN HOSPITAL ROOM: A LITTLE
DAILY ACTIVITIY SCORE: 18
SUGGESTED CMS G CODE MODIFIER MOBILITY: CK
EATING MEALS: A LITTLE
PERSONAL GROOMING: A LITTLE
MOBILITY SCORE: 17

## 2023-02-16 ASSESSMENT — PAIN SCALES - GENERAL: PAIN_LEVEL: 3

## 2023-02-16 ASSESSMENT — PAIN DESCRIPTION - PAIN TYPE
TYPE: CHRONIC PAIN

## 2023-02-16 NOTE — ANESTHESIA PREPROCEDURE EVALUATION
Case: [id number] Date/Time: 02/16/23 0945    Procedure: APPENDECTOMY, LAPAROSCOPIC    Location: [facility] OR 14 / SURGERY [facility]    Surgeons: Brett Perez M.D.          Relevant Problems   CARDIAC   (positive) AV block   (positive) Atrial fibrillation with RVR (HCC)   (positive) New onset of congestive heart failure (HCC)   (positive) Permanent atrial fibrillation (HCC)   (positive) Sick sinus syndrome (HCC)      GI   (positive) GERD (gastroesophageal reflux disease)       Physical Exam    Airway   Mallampati: II  TM distance: >3 FB  Neck ROM: full       Cardiovascular - normal exam  Rhythm: regular  Rate: normal  (-) murmur     Dental - normal exam           Pulmonary - normal exam  Breath sounds clear to auscultation     Abdominal    Neurological - normal exam                 Anesthesia Plan    ASA 3       Plan - general       Airway plan will be ETT          Induction: intravenous    Postoperative Plan: Postoperative administration of opioids is intended.    Pertinent diagnostic labs and testing reviewed    Informed Consent:    Anesthetic plan and risks discussed with patient.    Use of blood products discussed with: patient whom consented to blood products.

## 2023-02-16 NOTE — ED NOTES
Pt. Had 1 episode of bowel incontinence, pt. Assisted to bed side commode with 1 person assist.  Pt. Bedding changed and pt. Cleansed.  Transport here to take pt.  To pre-op For surgery.

## 2023-02-16 NOTE — ED TRIAGE NOTES
"Chief Complaint   Patient presents with    Abdominal Pain     Transfer from [facility] for appendicitis. Now, complains of mild RLQ pain. Received fentanyl 50 mcg enroute.     BIB flight crew for above complaint. Pacemaker in place -denies any chest pain.    BP (!) 163/80   Pulse 87   Temp 36.6 °C (97.8 °F) (Temporal)   Resp (!) 22   Ht 1.651 m (5' 5\")   Wt 60 kg (132 lb 4.4 oz)   SpO2 99%   BMI 22.01 kg/m²     "

## 2023-02-16 NOTE — ED NOTES
Rounded on patient. Patient resting in bed. No signs of distress noted. Equal chest rise and fall. No further needs at this time. Call light within reach.

## 2023-02-16 NOTE — CARE PLAN
The patient is Watcher - Medium risk of patient condition declining or worsening    Shift Goals  Clinical Goals: pain control    Progress made toward(s) clinical / shift goals:    Problem: Pain - Standard  Goal: Alleviation of pain or a reduction in pain to the patient’s comfort goal  Outcome: Progressing     Problem: Knowledge Deficit - Standard  Goal: Patient and family/care givers will demonstrate understanding of plan of care, disease process/condition, diagnostic tests and medications  Outcome: Progressing       Patient is not progressing towards the following goals:

## 2023-02-16 NOTE — ANESTHESIA TIME REPORT
Anesthesia Start and Stop Event Times     Date Time Event    2/16/2023 1013 Ready for Procedure     1049 Anesthesia Start     1148 Anesthesia Stop        Responsible Staff  02/16/23    Name Role Begin End    Benji Day M.D. Anesth 1049 1145        Overtime Reason:  no overtime (within assigned shift)    Comments:

## 2023-02-16 NOTE — ANESTHESIA POSTPROCEDURE EVALUATION
Patient: Jeny Bennett    Procedure Summary     Date: 02/16/23 Room / Location: [place] [address] / SURGERY [facility]    Anesthesia Start: 1049 Anesthesia Stop: 1148    Procedure: APPENDECTOMY, LAPAROSCOPIC (Abdomen) Diagnosis: (Acute appendicitis)    Surgeons: Brett Perez M.D. Responsible Provider: Benji Day M.D.    Anesthesia Type: general ASA Status: 3          Final Anesthesia Type: general  Last vitals  BP   Blood Pressure : 130/70    Temp   36.3 °C (97.3 °F)    Pulse   86   Resp   18    SpO2   99 %      Anesthesia Post Evaluation    Patient location during evaluation: PACU  Patient participation: complete - patient participated  Level of consciousness: awake and alert  Pain score: 3    Airway patency: patent  Anesthetic complications: no  Cardiovascular status: hemodynamically stable  Respiratory status: acceptable  Hydration status: euvolemic    PONV: none          No notable events documented.     Nurse Pain Score: 0 (NPRS)

## 2023-02-16 NOTE — ED NOTES
Received report from ANABELLE Inman to assume care of pt. At this time.  Pt. Resting on gurney using cell phone.  Pt. Denies needs at this time.

## 2023-02-16 NOTE — OP REPORT
Surgeon: Brett Perez MD   Preoperative diagnosis: Acute appendicitis   Postop diagnosis: Acute appendicitis without perforation and abscess   Procedure: Laparoscopic appendectomy   Anesthesia: General tracheal anesthesia   Anesthesiologist: Benji Day MD  Indications: 89-year-old woman with evidence by history, physical, laboratory data, and imaging of acute appendicitis.  An appendectomy is indicated.    Procedure: The procedure was discussed in detail with the patient including the laparoscopic approach, the risks of converting to an open procedure, and the risk of bleeding, infection, abscess, and hematoma. I also discussed the risk of postoperative appendiceal stump leak and postoperative abscess. The patient understood all the above and wished to proceed. Patient was placed under anesthesia by Dr. Day. The abdomen was prepped with chlorhexidine prep and sterile drapes. After a timeout a supraumbilical incision was made and through this incision a Veress needle was placed. Low pressure was confirmed and CO2 insufflation was used to achieve a pneumoperitoneum of 15 mmHg. Through the same incision a 5 mm port was placed and a laparoscope was inserted. Under direct visualization a 5 mm right-sided port and a 12 mm low midline port were placed. The cecum was identified and mobilized. An acutely inflamed appendix was identified.  There was essentially a moderate-sized phlegmon in this area.  The mesoappendix was identified and divided with a harmonic scalpel to the base of the appendix.  After the base was identified and its junction with the cecum defined, The appendix was divided at its base with an Endo MOOK 2.5 mm staple load.  Hemostasis was assured. The staple lines were inspected and noted to be intact and hemostatic. The appendix was removed in a bag retrieval device. The 12 mm port was removed and the fascia at that site was approximated with an 0 Vicryl stitch using the Endo Close device. The  remaining ports were removed and the pneumoperitoneum was released. The skin on all incisions was approximated with a 4-0 Vicryl stitch. Dermabond was placed. The patient tolerated the procedure well without apparent complication. Final counts were reported as correct.  Wound class was class III.  Dr. Zuniga did evaluate gallbladder and did not feel that any significant abnormality was present.  This was simply visual inspection not scrubbed in.

## 2023-02-16 NOTE — H&P
CHIEF COMPLAINT: Right lower quadrant pain.     HISTORY OF PRESENT ILLNESS: The patient is an 89 year-old White elderly woman who presents to the Emergency Department with a 2- day history of moderate and progressive generalized abdominal pain now localizing to the right lower quadrant. The pain is associated with nausea and anorexia.  CT imaging from the referring facility was consistent with early acute appendicitis.  She had a similar bout of acute appendicitis last fall which was managed medically (nonoperatively).  The patient denies any history of previous abdominal surgery.     PAST MEDICAL HISTORY:  has a past medical history of Arthritis, Back pain, Chronic anticoagulation, Fall (12-), Heart block, AV (05/2018), Hypertension, New onset of congestive heart failure (HCC) (11/10/2022), DELIA (obstructive sleep apnea) (7/30/2021), Paroxysmal atrial fibrillation (HCC), and Sepsis (HCC) (5/5/2018).    PAST SURGICAL HISTORY:  has a past surgical history that includes pacemaker insertion (Left, 05/07/2018).    ALLERGIES:   Allergies   Allergen Reactions    Amiodarone      Atrial fibrillation..organ prob's    Sulfa Drugs      Rash, chills, fever    Augmentin Nausea    Ciprofloxacin      Pt states it just didn't agree with her.     CURRENT MEDICATIONS:    Home Medications       Reviewed by Benson Zhang (Pharmacy Tech) on 02/15/23 at 2055  Med List Status: Complete     Medication Last Dose Status   allopurinol (ZYLOPRIM) 300 MG Tab 2/14/2023 Active   apixaban (ELIQUIS) 2.5mg Tab 2/15/2023 Active   Colchicine 0.6 MG Cap 2/14/2023 Active   digoxin (LANOXIN) 125 MCG Tab 2/15/2023 Active   furosemide (LASIX) 40 MG Tab 2/14/2023 Active   hydrocodone-acetaminophen (NORCO) 7.5-325 MG per tablet 2/15/2023 Active   lisinopril (PRINIVIL) 2.5 MG Tab 2/14/2023 Active   metoprolol SR (TOPROL XL) 50 MG TABLET SR 24 HR 2/15/2023 Active   pantoprazole (PROTONIX) 20 MG tablet 2/14/2023 Active   spironolactone (ALDACTONE)  "25 MG Tab 2/14/2023 Active   VITAMIN D PO unk Active   zolpidem (AMBIEN) 5 MG Tab 2/14/2023 Active                FAMILY HISTORY: family history includes Arthritis in her mother; Cancer in her mother; Heart Disease in her father; Hyperlipidemia in her father; Hypertension in her father, mother, and sister; Lung Disease in her sister.    SOCIAL HISTORY:  reports that she quit smoking about 34 years ago. Her smoking use included cigarettes. She smoked an average of .5 packs per day. She has never used smokeless tobacco. She reports current alcohol use. She reports that she does not use drugs.    REVIEW OF SYSTEMS: Comprehensive review of systems is negative with the exception of the aforementioned HPI, PMH, and PSH bullets in accordance with CMS guidelines.    PHYSICAL EXAMINATION:      Constitutional:     Vital Signs: BP (!) 163/80   Pulse 85   Temp 36.6 °C (97.8 °F) (Temporal)   Resp 20   Ht 1.651 m (5' 5\")   Wt 60 kg (132 lb 4.4 oz)   SpO2 91%    General Appearance: alert in no acute distress.   HEENT:    Demonstrates symmetric, reactive pupils. Extraocular muscles   are intact. Nares and oropharynx are clear.   Neck:    Supple. No adenopathy.  Respiratory:   Inspection: Unlabored respirations, no intercostal retractions, paradoxical motion, or accessory muscle use.   Auscultation: clear to auscultation.  Cardiovascular:   Inspection: The skin is warm, dry, and well purfused.  Auscultation: Regular rate and rhythm.   Peripheral Pulses: Normal.   Abdomen:  Inspection: Inspection reveals  a slightly protrudent abdomen .   Palpation: Palpation is remarkable for moderate tenderness in the right lower quadrant region. No abdominal wall hernias.  Extremities:   Examination of the upper and lower extremities demonstrates no cyanosis edema or clubbing.  Neurologic:   Alert & oriented to person, time and place. Normal motor function. Normal sensory function. No focal deficits noted.    LABORATORY VALUES:   Recent Labs "     02/15/23  1940   WBC 20.0*   RBC 4.44   HEMOGLOBIN 14.0   HEMATOCRIT 41.6   MCV 93.7   MCH 31.5   MCHC 33.7   RDW 52.2*   PLATELETCT 134*   MPV 9.2     Recent Labs     02/15/23  1940   SODIUM 143   POTASSIUM 4.0   CHLORIDE 102   CO2 29   GLUCOSE 103*   BUN 17   CREATININE 0.80   CALCIUM 8.8     Recent Labs     02/15/23  1940   ASTSGOT 27   ALTSGPT 20   TBILIRUBIN 1.9*   ALKPHOSPHAT 65   GLOBULIN 3.0      IMAGING:   Radiologic interpretation of her referral facility CT imaging describes acute appendicitis without evidence of perforation.  Also noted was fairly prominent biliary ductal dilation of uncertain etiology.  The actual imaging is not currently available for review.    ASSESSMENT AND PLAN:     Acute appendicitis.  Prior failure of nonoperative management.  Apixaban (Eliquis) anticoagulation for atrial fibrillation.  Biliary ductal dilation.    The patient will be admitted to the hospital.  Plan laparoscopic appendectomy and laparoscopic sonograpy of the gallbladder and accessible biliary tree. The surgical conduct was discussed in detail. Potential complications including, but not limited to, infection, bleeding, damage to adjacent structures, need to convert to an open procedure, and anesthetic complications were discussed. Questions were elicited and answered to the patient's satisfaction.  Operative consent signed.      ____________________________________     Brian Cameron M.D.    DD: 2/15/2023  8:45 PM

## 2023-02-16 NOTE — ED NOTES
Assisted to bedside commode. Urine sample collected and sent to lab. Patient requested to sit on a chair for comfort measure.

## 2023-02-16 NOTE — ED NOTES
Pt. Resting on gurney with no distress.  Pt. Assisted to plug in her cell phone per request.  Pt. Denies other needs at this time.

## 2023-02-16 NOTE — PROGRESS NOTES
Seen in preop  Discussed plans for an appendectomy  Discussed risk and benefits  Also explained that I do not think her biliary dilatation is clinically significant.  Wishes to proceed

## 2023-02-16 NOTE — ANESTHESIA PROCEDURE NOTES
Airway    Date/Time: 2/16/2023 11:01 AM  Performed by: Benji Day M.D.  Authorized by: Benji Day M.D.     Location:  OR  Urgency:  Elective  Indications for Airway Management:  Anesthesia      Spontaneous Ventilation: absent    Sedation Level:  Deep  Preoxygenated: Yes    Patient Position:  Sniffing  Final Airway Type:  Endotracheal airway  Final Endotracheal Airway:  ETT  Cuffed: Yes    Technique Used for Successful ETT Placement:  Direct laryngoscopy    Insertion Site:  Oral  Blade Type:  Josh  Laryngoscope Blade/Videolaryngoscope Blade Size:  3  ETT Size (mm):  7.0  Measured from:  Teeth  ETT to Teeth (cm):  21  Placement Verified by: auscultation and capnometry    Cormack-Lehane Classification:  Grade I - full view of glottis  Number of Attempts at Approach:  1

## 2023-02-17 ENCOUNTER — HOSPITAL ENCOUNTER (OUTPATIENT)
Dept: RADIOLOGY | Facility: MEDICAL CENTER | Age: 88
End: 2023-02-17
Payer: MEDICARE

## 2023-02-17 LAB
ANION GAP SERPL CALC-SCNC: 10 MMOL/L (ref 7–16)
BASOPHILS # BLD AUTO: 0.1 % (ref 0–1.8)
BASOPHILS # BLD: 0.01 K/UL (ref 0–0.12)
BUN SERPL-MCNC: 26 MG/DL (ref 8–22)
CALCIUM SERPL-MCNC: 8.7 MG/DL (ref 8.5–10.5)
CHLORIDE SERPL-SCNC: 101 MMOL/L (ref 96–112)
CO2 SERPL-SCNC: 27 MMOL/L (ref 20–33)
CREAT SERPL-MCNC: 0.89 MG/DL (ref 0.5–1.4)
EOSINOPHIL # BLD AUTO: 0 K/UL (ref 0–0.51)
EOSINOPHIL NFR BLD: 0 % (ref 0–6.9)
ERYTHROCYTE [DISTWIDTH] IN BLOOD BY AUTOMATED COUNT: 52.3 FL (ref 35.9–50)
GFR SERPLBLD CREATININE-BSD FMLA CKD-EPI: 62 ML/MIN/1.73 M 2
GLUCOSE SERPL-MCNC: 145 MG/DL (ref 65–99)
HCT VFR BLD AUTO: 37.3 % (ref 37–47)
HGB BLD-MCNC: 12.5 G/DL (ref 12–16)
IMM GRANULOCYTES # BLD AUTO: 0.11 K/UL (ref 0–0.11)
IMM GRANULOCYTES NFR BLD AUTO: 0.9 % (ref 0–0.9)
LYMPHOCYTES # BLD AUTO: 1.04 K/UL (ref 1–4.8)
LYMPHOCYTES NFR BLD: 8.3 % (ref 22–41)
MCH RBC QN AUTO: 30.9 PG (ref 27–33)
MCHC RBC AUTO-ENTMCNC: 33.5 G/DL (ref 33.6–35)
MCV RBC AUTO: 92.3 FL (ref 81.4–97.8)
MONOCYTES # BLD AUTO: 0.76 K/UL (ref 0–0.85)
MONOCYTES NFR BLD AUTO: 6.1 % (ref 0–13.4)
NEUTROPHILS # BLD AUTO: 10.56 K/UL (ref 2–7.15)
NEUTROPHILS NFR BLD: 84.6 % (ref 44–72)
NRBC # BLD AUTO: 0 K/UL
NRBC BLD-RTO: 0 /100 WBC
PLATELET # BLD AUTO: 108 K/UL (ref 164–446)
PMV BLD AUTO: 9.6 FL (ref 9–12.9)
POTASSIUM SERPL-SCNC: 3.7 MMOL/L (ref 3.6–5.5)
RBC # BLD AUTO: 4.04 M/UL (ref 4.2–5.4)
SODIUM SERPL-SCNC: 138 MMOL/L (ref 135–145)
WBC # BLD AUTO: 12.5 K/UL (ref 4.8–10.8)

## 2023-02-17 PROCEDURE — 99024 POSTOP FOLLOW-UP VISIT: CPT | Performed by: NURSE PRACTITIONER

## 2023-02-17 PROCEDURE — A9270 NON-COVERED ITEM OR SERVICE: HCPCS | Performed by: SURGERY

## 2023-02-17 PROCEDURE — 770001 HCHG ROOM/CARE - MED/SURG/GYN PRIV*

## 2023-02-17 PROCEDURE — 85025 COMPLETE CBC W/AUTO DIFF WBC: CPT

## 2023-02-17 PROCEDURE — 700102 HCHG RX REV CODE 250 W/ 637 OVERRIDE(OP): Performed by: SURGERY

## 2023-02-17 PROCEDURE — 80048 BASIC METABOLIC PNL TOTAL CA: CPT

## 2023-02-17 RX ORDER — PSEUDOEPHEDRINE HCL 30 MG
100 TABLET ORAL 2 TIMES DAILY PRN
COMMUNITY
Start: 2023-02-17 | End: 2023-08-03

## 2023-02-17 RX ORDER — ACETAMINOPHEN 325 MG/1
650 TABLET ORAL EVERY 6 HOURS
Qty: 30 TABLET | Refills: 0 | COMMUNITY
Start: 2023-02-17 | End: 2023-08-03

## 2023-02-17 RX ADMIN — ACETAMINOPHEN 650 MG: 325 TABLET, FILM COATED ORAL at 05:42

## 2023-02-17 RX ADMIN — OXYCODONE 5 MG: 5 TABLET ORAL at 20:23

## 2023-02-17 RX ADMIN — ACETAMINOPHEN 650 MG: 325 TABLET, FILM COATED ORAL at 12:01

## 2023-02-17 RX ADMIN — OMEPRAZOLE 20 MG: 20 CAPSULE, DELAYED RELEASE ORAL at 09:26

## 2023-02-17 RX ADMIN — COLCHICINE 0.6 MG: 0.6 TABLET, FILM COATED ORAL at 05:42

## 2023-02-17 RX ADMIN — METOPROLOL SUCCINATE 50 MG: 50 TABLET, EXTENDED RELEASE ORAL at 05:42

## 2023-02-17 RX ADMIN — ZOLPIDEM TARTRATE 2.5 MG: 5 TABLET ORAL at 20:25

## 2023-02-17 RX ADMIN — SPIRONOLACTONE 12.5 MG: 25 TABLET ORAL at 09:27

## 2023-02-17 RX ADMIN — ALLOPURINOL 300 MG: 300 TABLET ORAL at 05:42

## 2023-02-17 RX ADMIN — LISINOPRIL 2.5 MG: 5 TABLET ORAL at 09:26

## 2023-02-17 RX ADMIN — FUROSEMIDE 40 MG: 40 TABLET ORAL at 05:42

## 2023-02-17 RX ADMIN — OXYCODONE 5 MG: 5 TABLET ORAL at 16:25

## 2023-02-17 RX ADMIN — DIGOXIN 125 MCG: 0.25 TABLET ORAL at 16:25

## 2023-02-17 RX ADMIN — ACETAMINOPHEN 650 MG: 325 TABLET, FILM COATED ORAL at 16:26

## 2023-02-17 ASSESSMENT — ENCOUNTER SYMPTOMS
HEADACHES: 0
DIZZINESS: 0
FEVER: 0
MUSCULOSKELETAL NEGATIVE: 1
NAUSEA: 0
VOMITING: 0
DIARRHEA: 0

## 2023-02-17 ASSESSMENT — PAIN DESCRIPTION - PAIN TYPE
TYPE: ACUTE PAIN;SURGICAL PAIN
TYPE: SURGICAL PAIN;ACUTE PAIN
TYPE: ACUTE PAIN;SURGICAL PAIN
TYPE: CHRONIC PAIN
TYPE: ACUTE PAIN;SURGICAL PAIN
TYPE: SURGICAL PAIN
TYPE: SURGICAL PAIN;ACUTE PAIN

## 2023-02-17 NOTE — DISCHARGE INSTRUCTIONS
Post Operative Discharge Instructions:    Dr. Villareal has advised NO Eliquis until you speak to your Cardiologist and are cleared to resume .    1. DIET: Upon discharge from the hospital, you may resume your normal preoperative diet, unless specifically directed otherwise. Depending on how you are feeling and whether you have nausea or not, you may wish to stay with a bland diet for the first few days. However, you can advance this as quickly as you feel ready.    2. ACTIVITIES: After discharge from the hospital, you may resume full routine activities; however, there should be no heavy lifting (greater than 20 pounds or a bag of groceries) and no strenuous activities for at least 2 weeks. The duration may be longer, depending on your surgical procedure. Routine activities of daily living are acceptable. Activity level should be addressed at your post-op follow up appointment.    3. DRIVING: You may drive whenever you are off pain medications and are able to perform the activities needed to drive, i.e., turning, bending, twisting, etc.    4. BATHING: You may get the wound wet at any time after leaving the hospital. You may shower, but do not submerge in a bath for at least two weeks.  If you have wound dressings, they may come off after 48 hours.  If you have skin glue to the wound, this will fall off on its own, do not pick at it.  If you have Steri-Strips to the wound, these will fall off on their own, do not pick at them. You may trim the edges if needed.    5. BOWEL FUNCTION:   After surgery, it is not uncommon for patients to develop either frequent or loose stools after meals. This usually corrects itself after a few days, to a few weeks. If this occurs, do not worry; this will resolve on its own.  Constipation is much more common than loose stools. The cause is the combination of pain medication and decreased activity level and possibly the nature of the surgical procedure performed. If you feel this is  occurring, you may use an over-the-counter treatment such as MiraLAX (or Milk of Magnesia, Ex-Lax, Senokot, etc.) until the problem has resolved. Drink plenty of water and try to wean off narcotic pain medications as soon as is comfortable for you.    6. PAIN MEDICATION:   You will be given a prescription for pain medication at discharge. Please take these as directed. It is important to remember not to take medications on an empty stomach as this may cause nausea.  You may also take over the counter acetaminophen and/or NSAIDS (ibuprofen, Aleve, Advil, Motrin) per the package instructions.  You may also use ice to the wound to decrease pain and swelling. You may alternate 20 minutes on and 20 minutes off with the ice for the first 24-48 hours. Make sure you place a washcloth or towel between the ice pack and your skin.  Please note that narcotic pain medication cannot be refilled unless you are seen by a doctor. Make sure you call the office if you are running low on medication or if the dose you have been prescribed is not working well for you.    7.CALL THE [place] [facility] AT [phone] IF YOU HAVE:  (1) Fevers to more than 101F, (2) Unusual chest or leg pain, (3) Drainage or fluid from incision that may be foul smelling, increased tenderness or soreness at the wound or the wound edges are no longer together, redness or swelling at the incision site. Do not hesitate to call with any other questions.

## 2023-02-17 NOTE — PROGRESS NOTES
Bedside report received.  Assessment complete.  A&O x 4. Patient calls appropriately.  Patient ambulates with standby assist to the bathroom  Patient denies any pain at this time. Denies N&V. Tolerating regular diet.  Patient has 3 lap sites dermabond, bruising to site noted.   + void, + flatus, + BM.  Patient denies SOB.  SCD's on    Review plan with of care with patient. Call light and personal belongings within reach. Hourly rounding in place. All needs met at this time.

## 2023-02-17 NOTE — DISCHARGE PLANNING
Case Management Discharge Planning    Admission Date: 2/15/2023  GMLOS: 2.5  ALOS: 2    6-Clicks ADL Score: 18  6-Clicks Mobility Score: 17  PT and/or OT Eval ordered: No  Post-acute Referrals Ordered: No  Post-acute Choice Obtained: No  Has referral(s) been sent to post-acute provider:  NA      Anticipated Discharge Dispo: Discharge Disposition: Discharged to home/self care (01) with close OP follow up    DME Needed: No    Action(s) Taken: Updated Provider/Nurse on Discharge Plan    Pt is S/P Appendectomy, Laparoscopic (Abdomen) on 2/16/23.    Pt was discussed in IDT rounds today and the plan is to discharge Pt to home with close OP follow up tomorrow.     Pt is from [facility] and has Daughter for support.     Escalations Completed: None    Medically Clear: No    Next Steps:   This RN CM to continue to assist Pt with discharge as needed    Barriers to Discharge:   Pending medical clearance    Is the patient up for discharge tomorrow: Yes

## 2023-02-17 NOTE — DISCHARGE SUMMARY
DISCHARGE  SUMMARY    DATE OF ADMISSION: 2/15/2023    DATE OF DISCHARGE: 2/18/2023    DISCHARGE DIAGNOSIS:  Acute appendicitis     CONSULTATIONS:  None    PROCEDURES:  1.  Procedure planned with Dr. Abbasi on February 16, 2023, laparoscopic appendectomy    BRIEF HPI and HOSPITAL COURSE:  Briefly this is an 80-year-old man who presented with radiographic findings of acute appendicitis.  Patient underwent the above operative procedure as dictated by Dr. CAROL Perez MD.  Patient was also assessed by  due to some concern over an abnormality of her bladder and on visual inspection did not feel there was any significant abnormality present.  Patient was monitored in the hospital overnight secondary to her chronic congestive heart failure medications and living 3 to 4 hours from the hospital without any access to her medication.  Patient's daughter did drive up from the [place] area and will drive her back home.  Patient states understanding to follow-up instructions and anticoagulation is not to be resumed until she speaks to her cardiologist.  Patient was tolerating regular diet and state understanding to instructions    DISPOSITION:   Discharged home on 2/17/2023. The patient was and family were counseled and questions were answered. Specifically, signs and symptoms of infection, respiratory decompensation, anticoagulation and persistent or worsening pain were discussed and the patient agrees to seek medical attention if any of these develop.    DISCHARGE MEDICATIONS:  The patients controlled substance history was reviewed and a controlled substance use informed consent (if applicable) was provided by [facility] and the patient has been prescribed.     Medication List        START taking these medications        Instructions   acetaminophen 325 MG Tabs  Commonly known as: Tylenol   Take 2 Tablets by mouth every 6 hours.  Dose: 650 mg     docusate sodium 100 MG Caps   Take 100 mg by  mouth 2 times a day as needed for Constipation.  Dose: 100 mg            CONTINUE taking these medications        Instructions   allopurinol 300 MG Tabs  Commonly known as: ZYLOPRIM   Take 1 Tablet by mouth every day.  Dose: 300 mg     Colchicine 0.6 MG Caps   Take 0.6 mg by mouth every day.  Dose: 0.6 mg     digoxin 125 MCG Tabs  Commonly known as: LANOXIN   Take 1 Tablet by mouth every day.  Dose: 125 mcg     furosemide 40 MG Tabs  Commonly known as: LASIX   Take 1 Tablet by mouth every day.  Dose: 40 mg     HYDROcodone-acetaminophen 7.5-325 MG tab  Commonly known as: NORCO   Take 1-2 Tabs by mouth every four hours as needed for Severe Pain.  Dose: 1-2 Tablet     lisinopril 2.5 MG Tabs  Commonly known as: PRINIVIL   Take 1 Tablet by mouth every day.  Dose: 2.5 mg     metoprolol SR 50 MG Tb24  Commonly known as: TOPROL XL   Take 1 Tablet by mouth every day.  Dose: 50 mg     pantoprazole 20 MG tablet  Commonly known as: PROTONIX   Take 1 Tablet by mouth every day.  Dose: 20 mg     spironolactone 25 MG Tabs  Commonly known as: ALDACTONE   Take 0.5 Tablets by mouth every day.  Dose: 12.5 mg     VITAMIN D PO   Take 1 Tablet by mouth every Monday, Wednesday, and Friday.  Dose: 1 Tablet     zolpidem 5 MG Tabs  Commonly known as: AMBIEN   Take 2.5 mg by mouth at bedtime.  Dose: 2.5 mg            STOP taking these medications      apixaban 2.5mg Tabs  Commonly known as: ELIQUIS            You will be given a prescription for pain medication at discharge. Please take these as directed. It is important to remember not to take medications on an empty stomach as this may cause nausea.  You may also take over the counter acetaminophen and/or NSAIDS (ibuprofen, Aleve, Advil, Motrin) per the package instructions.  You may also use ice to the wound to decrease pain and swelling. You may alternate 20 minutes on and 20 minutes off with the ice for the first 24-48 hours. Make sure you place a washcloth or towel between the ice pack  and your skin.  Please note that narcotic pain medication cannot be refilled unless you are seen by a doctor. Make sure you call the office if you are running low on medication or if the dose you have been prescribed is not working well for you.    ACTIVITY:  After discharge from the hospital, you may resume full routine activities; however, there should be no heavy lifting (greater than 20 pounds or a bag of groceries) and no strenuous activities for at least 2 weeks. The duration may be longer, depending on your surgical procedure. Routine activities of daily living are acceptable. Activity level should be addressed at your post-op follow up appointment. You may drive whenever you are off pain medications and are able to perform the activities needed to drive, i.e., turning, bending, twisting, etc.      WOUND CARE:  You may shower, but do not submerge in a bath for at least two weeks. If you have wound dressings, they may come off after 48 hours. If you have skin glue to the wound, this will fall off on its own, do not pick at it. If you have steri strips to the wound, these will fall off on their own, do not pick at them, may trim the edges if needed.      DIET:  Upon discharge from the hospital, you may resume your normal preoperative diet, unless specifically directed otherwise. Depending on how you are feeling and whether you have nausea or not, you may wish to stay with a bland diet for the first few days. However, you can advance this as quickly as you feel ready.      FOLLOW UP:  Steven Villareal M.D.  [address]  [address]  [facility] [id number] [phone]    Follow up  For wound re-check if possibel in 1-2 weeks. Otherwise set up follow up with your primary care doctor in [facility]  [address], [address]  [facility] [id number] [phone]  Schedule an appointment as soon as possible for a visit  Make appoitment for tele-health to get cleared to  resumne your Eliqus.    Call the office if you have: (1) Fevers to more than 101F, (2) Unusual chest or leg pain, (3) Drainage or fluid from incision that may be foul smelling, increased tenderness or soreness at the wound or the wound edges are no longer together, redness or swelling at the incision site. Do not hesitate to call with any other questions.    TIME SPENT ON DISCHARGE: 28 minutes      ____________________________________________  DAYAMI Pagan.    DD: 2/17/2023 1:43 PM

## 2023-02-17 NOTE — PROGRESS NOTES
Bedside report received.  Assessment complete.  A&O x 4. Patient calls appropriately.  Patient ambulates with standby assist and 3 wheel walker.  Patient has 6/10 pain. Pain managed with prescribed medications.  Denies N&V. Tolerating regular diet.  3 lap sites closed with dermabond, bruising around sites.  + void, + flatus, + BM.  Patient denies SOB.  Review plan with of care with patient. Call light and personal belongings within reach. Hourly rounding in place. All needs met at this time.

## 2023-02-17 NOTE — CARE PLAN
The patient is Stable - Low risk of patient condition declining or worsening    Shift Goals  Clinical Goals: ambulation, fall prevention   Patient Goals: ambulation      Progress made toward(s) clinical / shift goals: Encouraged patient to use  call lights for any assistance with ambulation to bathroom. Patient is  a moderate risk for fall, bed alarm refused.       Patient is not progressing towards the following goals: N/A

## 2023-02-17 NOTE — CARE PLAN
The patient is Stable - Low risk of patient condition declining or worsening    Shift Goals  Clinical Goals: pain control, rest  Patient Goals: pain control    Progress made toward(s) clinical / shift goals:  Pain being controlled with PRN pain medications, heat packs and rest.      Problem: Pain - Standard  Goal: Alleviation of pain or a reduction in pain to the patient’s comfort goal  Outcome: Progressing     Problem: Knowledge Deficit - Standard  Goal: Patient and family/care givers will demonstrate understanding of plan of care, disease process/condition, diagnostic tests and medications  Outcome: Progressing       Patient is not progressing towards the following goals:

## 2023-02-17 NOTE — PROGRESS NOTES
"    DATE: 2/17/2023    Post Operative Day  1 laparoscopic appendectomy.    INTERVAL EVENTS:  Patient doing well overall  She does live in [place] and her daughter will need to drive up to get her.  Patient does have a history of heart failure and does not have her home medications with her.  Discharge in am with yakelin      REVIEW OF SYSTEMS:  Review of Systems   Constitutional:  Negative for fever.   Gastrointestinal:  Negative for diarrhea, nausea and vomiting.   Genitourinary: Negative.    Musculoskeletal: Negative.    Neurological:  Negative for dizziness and headaches.     PHYSICAL EXAMINATION:  Vital Signs: /62   Pulse 69   Temp 36 °C (96.8 °F) (Temporal)   Resp 17   Ht 1.651 m (5' 5\")   Wt 60 kg (132 lb 4.4 oz)   SpO2 92%   Physical Exam  Vitals and nursing note reviewed.   HENT:      Right Ear: External ear normal.      Left Ear: External ear normal.      Mouth/Throat:      Pharynx: Oropharynx is clear.   Eyes:      Conjunctiva/sclera: Conjunctivae normal.   Pulmonary:      Effort: Pulmonary effort is normal.   Abdominal:      General: There is no distension.      Palpations: Abdomen is soft.      Tenderness: There is no abdominal tenderness.      Comments: Lap sites with some bruising around site. Clean guaze dressing in place.   Musculoskeletal:         General: Normal range of motion.      Cervical back: Normal range of motion.      Comments: Chronic changes to hand joints.   Skin:     General: Skin is warm and dry.   Neurological:      General: No focal deficit present.      Mental Status: She is alert and oriented to person, place, and time.   Psychiatric:         Mood and Affect: Mood normal.       LABORATORY VALUES:   Recent Labs     02/15/23  1940 02/16/23  0350 02/17/23  0459   WBC 20.0* 15.3* 12.5*   RBC 4.44 4.06* 4.04*   HEMOGLOBIN 14.0 12.7 12.5   HEMATOCRIT 41.6 37.8 37.3   MCV 93.7 93.1 92.3   MCH 31.5 31.3 30.9   MCHC 33.7 33.6 33.5*   RDW 52.2* 51.9* 52.3*   PLATELETCT 134* 115* " 108*   MPV 9.2 9.3 9.6     Recent Labs     02/15/23  1940 02/16/23  0350 02/17/23  0459   SODIUM 143 140 138   POTASSIUM 4.0 3.6 3.7   CHLORIDE 102 103 101   CO2 29 27 27   GLUCOSE 103* 93 145*   BUN 17 17 26*   CREATININE 0.80 0.71 0.89   CALCIUM 8.8 8.6 8.7     Recent Labs     02/15/23  1940   ASTSGOT 27   ALTSGPT 20   TBILIRUBIN 1.9*   ALKPHOSPHAT 65   GLOBULIN 3.0            IMAGING:   No orders to display       Core Measures & Quality Metrics    ASSESSMENT AND PLAN:   * Appendicitis, acute- (present on admission)  Assessment & Plan  2/16  Laparoscopic appendectomy.         Discussed patient condition with RN, Patient, and general surgery Dr. Villareal.

## 2023-02-18 VITALS
WEIGHT: 132.28 LBS | RESPIRATION RATE: 16 BRPM | BODY MASS INDEX: 22.04 KG/M2 | SYSTOLIC BLOOD PRESSURE: 133 MMHG | TEMPERATURE: 97.5 F | HEART RATE: 79 BPM | DIASTOLIC BLOOD PRESSURE: 69 MMHG | OXYGEN SATURATION: 92 % | HEIGHT: 65 IN

## 2023-02-18 LAB
ANION GAP SERPL CALC-SCNC: 10 MMOL/L (ref 7–16)
BASOPHILS # BLD AUTO: 0.2 % (ref 0–1.8)
BASOPHILS # BLD: 0.02 K/UL (ref 0–0.12)
BUN SERPL-MCNC: 33 MG/DL (ref 8–22)
CALCIUM SERPL-MCNC: 8.4 MG/DL (ref 8.5–10.5)
CHLORIDE SERPL-SCNC: 103 MMOL/L (ref 96–112)
CO2 SERPL-SCNC: 25 MMOL/L (ref 20–33)
CREAT SERPL-MCNC: 0.79 MG/DL (ref 0.5–1.4)
EOSINOPHIL # BLD AUTO: 0.07 K/UL (ref 0–0.51)
EOSINOPHIL NFR BLD: 0.6 % (ref 0–6.9)
ERYTHROCYTE [DISTWIDTH] IN BLOOD BY AUTOMATED COUNT: 51.3 FL (ref 35.9–50)
GFR SERPLBLD CREATININE-BSD FMLA CKD-EPI: 71 ML/MIN/1.73 M 2
GLUCOSE SERPL-MCNC: 115 MG/DL (ref 65–99)
HCT VFR BLD AUTO: 37.3 % (ref 37–47)
HGB BLD-MCNC: 12.4 G/DL (ref 12–16)
IMM GRANULOCYTES # BLD AUTO: 0.09 K/UL (ref 0–0.11)
IMM GRANULOCYTES NFR BLD AUTO: 0.7 % (ref 0–0.9)
LYMPHOCYTES # BLD AUTO: 1.84 K/UL (ref 1–4.8)
LYMPHOCYTES NFR BLD: 15.2 % (ref 22–41)
MCH RBC QN AUTO: 31.1 PG (ref 27–33)
MCHC RBC AUTO-ENTMCNC: 33.2 G/DL (ref 33.6–35)
MCV RBC AUTO: 93.5 FL (ref 81.4–97.8)
MONOCYTES # BLD AUTO: 1.07 K/UL (ref 0–0.85)
MONOCYTES NFR BLD AUTO: 8.8 % (ref 0–13.4)
NEUTROPHILS # BLD AUTO: 9.04 K/UL (ref 2–7.15)
NEUTROPHILS NFR BLD: 74.5 % (ref 44–72)
NRBC # BLD AUTO: 0 K/UL
NRBC BLD-RTO: 0 /100 WBC
PLATELET # BLD AUTO: 148 K/UL (ref 164–446)
PMV BLD AUTO: 10.3 FL (ref 9–12.9)
POTASSIUM SERPL-SCNC: 3.5 MMOL/L (ref 3.6–5.5)
RBC # BLD AUTO: 3.99 M/UL (ref 4.2–5.4)
SODIUM SERPL-SCNC: 138 MMOL/L (ref 135–145)
WBC # BLD AUTO: 12.1 K/UL (ref 4.8–10.8)

## 2023-02-18 PROCEDURE — 700102 HCHG RX REV CODE 250 W/ 637 OVERRIDE(OP): Performed by: SURGERY

## 2023-02-18 PROCEDURE — 99238 HOSP IP/OBS DSCHRG MGMT 30/<: CPT | Performed by: NURSE PRACTITIONER

## 2023-02-18 PROCEDURE — A9270 NON-COVERED ITEM OR SERVICE: HCPCS | Performed by: SURGERY

## 2023-02-18 PROCEDURE — 80048 BASIC METABOLIC PNL TOTAL CA: CPT

## 2023-02-18 PROCEDURE — 85025 COMPLETE CBC W/AUTO DIFF WBC: CPT

## 2023-02-18 RX ADMIN — METOPROLOL SUCCINATE 50 MG: 50 TABLET, EXTENDED RELEASE ORAL at 06:18

## 2023-02-18 RX ADMIN — ALLOPURINOL 300 MG: 300 TABLET ORAL at 06:18

## 2023-02-18 RX ADMIN — OXYCODONE 5 MG: 5 TABLET ORAL at 01:53

## 2023-02-18 RX ADMIN — LISINOPRIL 2.5 MG: 5 TABLET ORAL at 06:19

## 2023-02-18 RX ADMIN — FUROSEMIDE 40 MG: 40 TABLET ORAL at 06:19

## 2023-02-18 RX ADMIN — ACETAMINOPHEN 650 MG: 325 TABLET, FILM COATED ORAL at 06:18

## 2023-02-18 RX ADMIN — SPIRONOLACTONE 12.5 MG: 25 TABLET ORAL at 06:19

## 2023-02-18 RX ADMIN — COLCHICINE 0.6 MG: 0.6 TABLET, FILM COATED ORAL at 06:18

## 2023-02-18 RX ADMIN — OXYCODONE 5 MG: 5 TABLET ORAL at 06:26

## 2023-02-18 RX ADMIN — OMEPRAZOLE 20 MG: 20 CAPSULE, DELAYED RELEASE ORAL at 06:18

## 2023-02-18 ASSESSMENT — PAIN DESCRIPTION - PAIN TYPE
TYPE: CHRONIC PAIN
TYPE: CHRONIC PAIN
TYPE: ACUTE PAIN

## 2023-02-18 NOTE — CARE PLAN
The patient is Stable - Low risk of patient condition declining or worsening    Shift Goals  Clinical Goals: pain management  Patient Goals: discharge home    Progress made toward(s) clinical / shift goals:  Pt's 7/10 pain managed with PRN pain medications and rest. Pain medications available discussed with pt. Bed locked and in lowest position, call light and belongings within reach, pt resting comfortably in bed and able to sleep throughout the night, all needs met at this time.   Problem: Pain - Standard  Goal: Alleviation of pain or a reduction in pain to the patient’s comfort goal  Outcome: Progressing     Problem: Knowledge Deficit - Standard  Goal: Patient and family/care givers will demonstrate understanding of plan of care, disease process/condition, diagnostic tests and medications  Outcome: Progressing     Problem: Fall Risk  Goal: Patient will remain free from falls  Outcome: Progressing       Patient is not progressing towards the following goals:

## 2023-02-18 NOTE — PROGRESS NOTES
Received report from previous shift RN. Assumed care of patient at 1900.  Assessment complete.  Patient A&O x 4. Patient calls appropriately.  Patient ambulates with SB assist and 3 point walker.   Patient has 7/10 pain. Pain managed with prescribed medications.  Denies N&V. Tolerating regular diet.  Lap sites x3 with dermabond, bruising, and abdominal distention.   + void, + flatus, 2/17 BM.  Patient on RA, denies SOB.  SCD's on.     Reviewed plan of care with patient. Call light and personal belongings within reach. Hourly rounding in place. All needs met at this time.

## 2023-02-18 NOTE — CARE PLAN
The patient is Stable - Low risk of patient condition declining or worsening    Shift Goals  Clinical Goals: pain management, discharge planning  Patient Goals: Discharge    Progress made toward(s) clinical / shift goals: PRN medication  administered per MAR. Encouraged non-pharmacological measures for pain. Education on POC provided. Planning to utilize discharge lounge.     Patient is not progressing towards the following goals: N/A

## 2023-02-18 NOTE — PROGRESS NOTES
Discharge orders received.  Patient arrived to the discharge lounge.  PIV removed.  Instructions given, medications reviewed and general discharge education provided to patient.  Follow up appointments discussed.  Patient verbalized understanding of dc instructions and prescriptions.  Patient signed discharge instructions.  Patient verbalized she had all belongings with her.  Patient left via car to home.  Wished patient a speedy recovery.

## 2023-02-18 NOTE — PROGRESS NOTES
Bedside report received. Assessment complete.  A&O x 3. Patient calls appropriately.  Patient ambulates with standby assist. Bed alarm refused.  Patient gas no complaints of pain at this time. Encouraged non-pharmacological measures for any complaints of pain.   Denies N&V. Tolerating regular diet diet.  3 lap sites, CDI, DAVID with bruising around sites noted.      + void, + flatus, last BM was 02/17/23.  Patient denies SOB.  SCD's on.    Review plan with of care with patient. Call light and personal belongings within reach. Hourly rounding in place. All needs met at this time.

## 2023-02-18 NOTE — PROGRESS NOTES
Patient was appropriate to utilize discharge lounge. Patient was picked up by discharge lounge staff and transported via wheelchair. Patient was alert and oriented and no complaints of pain.

## 2023-02-18 NOTE — PROGRESS NOTES
Patient cleared for discharge.  Daughter will take her home to [place]  Follow up discussed.

## 2023-02-28 ENCOUNTER — TELEPHONE (OUTPATIENT)
Dept: CARDIOLOGY | Facility: MEDICAL CENTER | Age: 88
End: 2023-02-28
Payer: MEDICARE

## 2023-02-28 NOTE — TELEPHONE ENCOUNTER
PANCHITO        Caller: Jeny Bennett      Topic/issue: Patient was calling to see when she would be able to resume taking her Eliquis medication and she was asking for a call back      Callback Number: [phone]      Thank you    -Walter MALCOLM

## 2023-03-01 NOTE — TELEPHONE ENCOUNTER
Called pt [phone]  Pt reports having appendectomy surgery on 2/16/23. Pt was told to restart Eliquis in 6 weeks per surgeon. Pt wants to know if DA agrees or if pt should restart Eliquis before 6 weeks. Advised pt that she should follow surgeon's recommendations. Pt insists DA call her to tell her that herself.        To PANCHITO Garzaz call pt regard when to restart Eliquis

## 2023-03-01 NOTE — TELEPHONE ENCOUNTER
PANCHITO    Caller: Jeny Bennett     Topic/issue: Patient was calling to see when she would be able to resume taking her Eliquis medication and she was asking for a call back. She said it's urgent because it's been 2 weeks (3 weeks tomorrow) and she is worried about not taking it for that long.     Callback Number: [phone]       Thank you,   -Miladis MALCOLM

## 2023-03-09 ENCOUNTER — TELEMEDICINE (OUTPATIENT)
Dept: CARDIOLOGY | Facility: MEDICAL CENTER | Age: 88
End: 2023-03-09
Payer: MEDICARE

## 2023-03-09 VITALS
DIASTOLIC BLOOD PRESSURE: 75 MMHG | HEIGHT: 65 IN | HEART RATE: 84 BPM | SYSTOLIC BLOOD PRESSURE: 109 MMHG | BODY MASS INDEX: 19.99 KG/M2 | WEIGHT: 120 LBS

## 2023-03-09 DIAGNOSIS — Z95.0 S/P PLACEMENT OF CARDIAC PACEMAKER: ICD-10-CM

## 2023-03-09 DIAGNOSIS — Z79.01 CHRONIC ANTICOAGULATION: ICD-10-CM

## 2023-03-09 DIAGNOSIS — I49.5 SICK SINUS SYNDROME (HCC): ICD-10-CM

## 2023-03-09 DIAGNOSIS — I50.32 CHRONIC HEART FAILURE WITH PRESERVED EJECTION FRACTION (HCC): ICD-10-CM

## 2023-03-09 DIAGNOSIS — I10 ESSENTIAL HYPERTENSION: ICD-10-CM

## 2023-03-09 DIAGNOSIS — I48.91 HYPERCOAGULABLE STATE DUE TO ATRIAL FIBRILLATION (HCC): ICD-10-CM

## 2023-03-09 DIAGNOSIS — D68.69 HYPERCOAGULABLE STATE DUE TO ATRIAL FIBRILLATION (HCC): ICD-10-CM

## 2023-03-09 DIAGNOSIS — I48.21 PERMANENT ATRIAL FIBRILLATION (HCC): ICD-10-CM

## 2023-03-09 PROBLEM — I50.22 CHRONIC HFREF (HEART FAILURE WITH REDUCED EJECTION FRACTION) (HCC): Status: RESOLVED | Noted: 2022-11-10 | Resolved: 2023-03-09

## 2023-03-09 PROBLEM — I50.9 NEW ONSET OF CONGESTIVE HEART FAILURE (HCC): Status: RESOLVED | Noted: 2022-11-10 | Resolved: 2023-03-09

## 2023-03-09 PROCEDURE — 99214 OFFICE O/P EST MOD 30 MIN: CPT | Mod: 95 | Performed by: INTERNAL MEDICINE

## 2023-03-09 RX ORDER — MELOXICAM 7.5 MG/1
TABLET ORAL
COMMUNITY
Start: 2023-01-20 | End: 2023-03-09

## 2023-03-09 ASSESSMENT — FIBROSIS 4 INDEX: FIB4 SCORE: 3.63

## 2023-03-09 NOTE — PROGRESS NOTES
Cardiology Telemedicine Visit Follow-up Consultation Note    Date of note: 3/9/2023  Primary Care Provider: Cee Ramos M.D.    Name:             Jeny Bennett     YOB: 1933  MRN:               [id number]      This evaluation was conducted via Zoom, using secure and encrypted videoconferencing technology.  The patient was at home in the [facility].  The patient's identity was confirmed and verbal consent for the telemedicine encounter was obtained.       Chief Complaint   Patient presents with    Atrial Fibrillation     F/V Dx: Paroxysmal atrial fibrillation (HCC    Other     F/V Dx: Cardiac pacemaker in situ       HISTORY OF PRESENT ILLNESS  Ms. Jeny Bennett is a 89 y.o. female who returns to see us for follow-up     Pertinent History:  Permanent atrial fibrillation: Was on Multaq 400 mg twice daily which was discontinued with findings of HFrEF.  Intolerant to amiodarone.  On metoprolol XL and digoxin for rate control  SSS s/p pacemaker implantation 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  Was transferred to [facility] from [facility] and underwent successful appendectomy with Dr. Cameron on 2/15/2023.  Was told to hold Eliquis for 6 weeks postsurgery.  She is concerned given permanent atrial fibrillation and being off of anticoagulation.    Is also continuing on blood pressure and low energy levels.  Is wondering if she can change some of her medications.    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     Hypertension     New onset of congestive heart failure (HCC) 11/10/2022    DELIA (obstructive sleep apnea) 7/30/2021    Paroxysmal atrial fibrillation (HCC)     Sepsis (HCC) 5/5/2018         Past Surgical History:   Procedure Laterality Date    TX LAP,APPENDECTOMY N/A 2/16/2023    Procedure: APPENDECTOMY, LAPAROSCOPIC;  Surgeon: Brett Perez M.D.;  Location: SURGERY [facility];  Service: General    PACEMAKER INSERTION Left 05/07/2018    [facility] [employer] MRI 2272 implanted by Dr. Jones.         Current Outpatient Medications   Medication Sig Dispense Refill    acetaminophen (TYLENOL) 325 MG Tab Take 2 Tablets by mouth every 6 hours. 30 Tablet 0    docusate sodium 100 MG Cap Take 100 mg by mouth 2 times a day as needed for Constipation.      metoprolol SR (TOPROL XL) 50 MG TABLET SR 24 HR Take 1 Tablet by mouth every day. 100 Tablet 3    spironolactone (ALDACTONE) 25 MG Tab Take 0.5 Tablets by mouth every day. 45 Tablet 3    digoxin (LANOXIN) 125 MCG Tab Take 1 Tablet by mouth every day. 90 Tablet 3    zolpidem (AMBIEN) 5 MG Tab Take 2.5 mg by mouth at bedtime.      furosemide (LASIX) 40 MG Tab Take 1 Tablet by mouth every day. 30 Tablet     VITAMIN D PO Take 1 Tablet by mouth every Monday, Wednesday, and Friday.      pantoprazole (PROTONIX) 20 MG tablet Take 1 Tablet by mouth every day.      allopurinol (ZYLOPRIM) 300 MG Tab Take 1 Tablet by mouth every day.      hydrocodone-acetaminophen (NORCO) 7.5-325 MG per tablet Take 1-2 Tabs by mouth every four hours as needed for Severe Pain.       No current facility-administered medications for this visit.         Allergies   Allergen Reactions    Amiodarone      Atrial fibrillation..organ prob's    Sulfa Drugs      Rash, chills, fever    Augmentin Nausea    Ciprofloxacin      Pt states it just didn't agree with her.         Family History   Problem Relation Age of Onset    Arthritis Mother     Cancer Mother     Hypertension Mother     Hypertension Father     Heart  "Disease Father     Hyperlipidemia Father     Lung Disease Sister     Hypertension Sister          Social History     Socioeconomic History    Marital status:      Spouse name: Not on file    Number of children: Not on file    Years of education: Not on file    Highest education level: Not on file   Occupational History    Not on file   Tobacco Use    Smoking status: Former     Packs/day: 0.50     Types: Cigarettes     Quit date: 1989     Years since quittin.2    Smokeless tobacco: Never   Vaping Use    Vaping Use: Never used   Substance and Sexual Activity    Alcohol use: Yes     Comment: occasional glass of wine    Drug use: No    Sexual activity: Not on file   Other Topics Concern    Not on file   Social History Narrative    Not on file     Social Determinants of Health     Financial Resource Strain: Not on file   Food Insecurity: Not on file   Transportation Needs: Not on file   Physical Activity: Not on file   Stress: Not on file   Social Connections: Not on file   Intimate Partner Violence: Not on file   Housing Stability: Not on file         Physical Exam:  Vitals as provided by the patient  /75 (BP Location: Left arm, Patient Position: Sitting, BP Cuff Size: Adult)   Pulse 84   Ht 1.651 m (5' 5\")   Wt 54.4 kg (120 lb)    Oxygen Therapy:     BP Readings from Last 4 Encounters:   23 109/75   23 133/69   23 130/72   22 130/80       Weight/BMI: Body mass index is 19.97 kg/m².  Wt Readings from Last 4 Encounters:   23 54.4 kg (120 lb)   02/15/23 60 kg (132 lb 4.4 oz)   22 57.6 kg (127 lb)   22 59.4 kg (131 lb)       GEN: Well developed, well nourished and in no acute distress.  Neck:  No JVD noted at 90 degrees, trachea midline  CVS: Pulse as reported by patient, + LE edema.  Resp: Unlabored respiratory effort, no cough or audible wheeze  MSK/Ext: No clubbing or cyanosis visible appreciated.  Skin: No rashes in visible areas.  Psych: A&O x 3, " appropriate affect, good judgement, well groomed      Lab Data Review:  Lab Results   Component Value Date/Time    CHOLSTRLTOT 110 12/13/2016 01:00 AM    LDL 63 12/13/2016 01:00 AM    HDL 37 (A) 12/13/2016 01:00 AM    TRIGLYCERIDE 49 12/13/2016 01:00 AM       Lab Results   Component Value Date/Time    SODIUM 138 02/18/2023 01:52 AM    POTASSIUM 3.5 (L) 02/18/2023 01:52 AM    CHLORIDE 103 02/18/2023 01:52 AM    CO2 25 02/18/2023 01:52 AM    GLUCOSE 115 (H) 02/18/2023 01:52 AM    BUN 33 (H) 02/18/2023 01:52 AM    CREATININE 0.79 02/18/2023 01:52 AM     Lab Results   Component Value Date/Time    ALKPHOSPHAT 65 02/15/2023 07:40 PM    ASTSGOT 27 02/15/2023 07:40 PM    ALTSGPT 20 02/15/2023 07:40 PM    TBILIRUBIN 1.9 (H) 02/15/2023 07:40 PM      Lab Results   Component Value Date/Time    WBC 12.1 (H) 02/18/2023 01:52 AM         Cardiac Imaging and Procedures Review:    EKG dated 2/16/2023: My personal interpretation is atrial fibrillation, V-paced rhythm    EKG dated 5/8/2018: My personal interpretation is A. fib/flutter with V paced complexes    NM cardiac PET 12/7/2022:  No evidence of ischemia or infarction  Rest LVEF 52%, stress LVEF 53%    Echo: Echocardiogram performed on 2/7/2023 was personally interpreted by me which shows low normal left ventricular systolic function with LVEF 50-55%, RVSP 40 mmHg with mild TR    Outside Echo dated 6/19/2022:  Moderately reduced left ventricular systolic function with EF 35-40%  Severely dilated left atrium  Mild to moderate MR  Moderate TR  Estimated RVSP 50 mmHg    Echo dated 10/15/2018:   My personal interpretation is normal left ventricular size and function, moderate TR with RVSP consistent with moderate pulmonary hypertension    Device interrogation 6/30/2022:  99.9% AF burden with 26% V pacing.  Battery life 4.6 years    Device interrogation 12/29/2022:  99.9% AF burden, 42% V pacing.  2 episodes of RVR  Battery life: 4.4 years      Assessment & Plan     1. Permanent  atrial fibrillation (HCC)        2. Chronic anticoagulation        3. S/P placement of cardiac pacemaker        4. Sick sinus syndrome (HCC)        5. Hypercoagulable state due to atrial fibrillation (HCC)        6. Chronic heart failure with preserved ejection fraction (HCC)        7. Essential hypertension            Complex medical history with CHF, gallbladder mass without malignancy, persistent atrial fibrillation and episodes of RVR.  Intolerant to amiodarone, Multaq discontinued due to findings of HFrEF and decompensated heart failure.  Recent appendectomy and has been off of anticoagulation with being in persistent atrial fibrillation.  High risk of stroke.    After discussion, restart Eliquis 4 weeks postop from appendectomy rather than waiting for 6 weeks.  We discussed warning signs for internal bleeding after initiating oral anticoagulation to which she voices understanding.    We reviewed echocardiogram results which are resolution of heart failure with now low normal function.  There is also improvement in TR and pulmonary pressure.  Continue metoprolol XL 50 mg and spironolactone 12.5 mg daily.    Due to low BP and ongoing fatigue, recommend discontinuing lisinopril 2.5 mg since heart function has normalized.    For HFpEF, continue Lasix 40 mg daily along with potassium supplement.      All of patient's excellent questions were answered to the best of my knowledge and to her satisfaction.  It was a pleasure seeing Ms. Jeny Bennett in my clinic today. Return in about 6 weeks (around 4/20/2023). Patient is aware to call the cardiology clinic with any questions or concerns.      Michael Edmondson MD  [facility]  [place], [address]  [address] [address]  Phone: [phone]  Fax: [phone]

## 2023-03-30 ENCOUNTER — NON-PROVIDER VISIT (OUTPATIENT)
Dept: CARDIOLOGY | Facility: MEDICAL CENTER | Age: 88
End: 2023-03-30
Payer: MEDICARE

## 2023-03-30 PROCEDURE — 93294 REM INTERROG EVL PM/LDLS PM: CPT | Performed by: INTERNAL MEDICINE

## 2023-03-30 NOTE — CARDIAC REMOTE MONITOR - SCAN
Device transmission reviewed. Device demonstrated appropriate function.       Electronically Signed by: Ja Hall M.D.    4/8/2023  10:41 AM

## 2023-05-15 ENCOUNTER — TELEPHONE (OUTPATIENT)
Dept: CARDIOLOGY | Facility: MEDICAL CENTER | Age: 88
End: 2023-05-15
Payer: MEDICARE

## 2023-05-15 NOTE — TELEPHONE ENCOUNTER
LVM 2X for pt to call back and r/s. Informed pt appt had been cancelled. Sent [employer] message. /cg 05/15/23

## 2023-06-20 ENCOUNTER — HOSPITAL ENCOUNTER (OUTPATIENT)
Dept: RADIOLOGY | Facility: MEDICAL CENTER | Age: 88
End: 2023-06-20
Payer: MEDICARE

## 2023-06-29 ENCOUNTER — NON-PROVIDER VISIT (OUTPATIENT)
Dept: CARDIOLOGY | Facility: MEDICAL CENTER | Age: 88
End: 2023-06-29
Payer: MEDICARE

## 2023-06-29 PROCEDURE — 93294 REM INTERROG EVL PM/LDLS PM: CPT | Performed by: INTERNAL MEDICINE

## 2023-06-29 NOTE — CARDIAC REMOTE MONITOR - SCAN
Device transmission reviewed. Device demonstrated appropriate function.       Electronically Signed by: Bella Jones M.D.    7/13/2023  8:12 AM

## 2023-08-03 ENCOUNTER — OFFICE VISIT (OUTPATIENT)
Dept: CARDIOLOGY | Facility: MEDICAL CENTER | Age: 88
End: 2023-08-03
Payer: MEDICARE

## 2023-08-03 ENCOUNTER — NON-PROVIDER VISIT (OUTPATIENT)
Dept: CARDIOLOGY | Facility: MEDICAL CENTER | Age: 88
End: 2023-08-03
Payer: MEDICARE

## 2023-08-03 ENCOUNTER — NON-PROVIDER VISIT (OUTPATIENT)
Dept: CARDIOLOGY | Facility: MEDICAL CENTER | Age: 88
End: 2023-08-03

## 2023-08-03 VITALS
WEIGHT: 138 LBS | RESPIRATION RATE: 16 BRPM | HEIGHT: 65 IN | SYSTOLIC BLOOD PRESSURE: 124 MMHG | DIASTOLIC BLOOD PRESSURE: 66 MMHG | BODY MASS INDEX: 22.99 KG/M2 | HEART RATE: 79 BPM | OXYGEN SATURATION: 94 %

## 2023-08-03 DIAGNOSIS — I50.9 CHRONIC CONGESTIVE HEART FAILURE, UNSPECIFIED HEART FAILURE TYPE (HCC): ICD-10-CM

## 2023-08-03 DIAGNOSIS — I49.5 SICK SINUS SYNDROME (HCC): ICD-10-CM

## 2023-08-03 DIAGNOSIS — Z95.0 S/P PLACEMENT OF CARDIAC PACEMAKER: ICD-10-CM

## 2023-08-03 DIAGNOSIS — I48.21 PERMANENT ATRIAL FIBRILLATION (HCC): ICD-10-CM

## 2023-08-03 DIAGNOSIS — I44.30 AV BLOCK: ICD-10-CM

## 2023-08-03 DIAGNOSIS — I10 ESSENTIAL HYPERTENSION: ICD-10-CM

## 2023-08-03 PROCEDURE — 99214 OFFICE O/P EST MOD 30 MIN: CPT

## 2023-08-03 PROCEDURE — 3074F SYST BP LT 130 MM HG: CPT

## 2023-08-03 PROCEDURE — 93279 PRGRMG DEV EVAL PM/LDLS PM: CPT | Performed by: INTERNAL MEDICINE

## 2023-08-03 PROCEDURE — 3078F DIAST BP <80 MM HG: CPT

## 2023-08-03 PROCEDURE — 99212 OFFICE O/P EST SF 10 MIN: CPT

## 2023-08-03 RX ORDER — APIXABAN 2.5 MG/1
TABLET, FILM COATED ORAL
COMMUNITY
Start: 2023-07-18

## 2023-08-03 ASSESSMENT — ENCOUNTER SYMPTOMS
SHORTNESS OF BREATH: 1
CONSTITUTIONAL NEGATIVE: 1
DEPRESSION: 0
MUSCULOSKELETAL NEGATIVE: 1
GASTROINTESTINAL NEGATIVE: 1
EYES NEGATIVE: 1
NEUROLOGICAL NEGATIVE: 1
PALPITATIONS: 0
PND: 0
NERVOUS/ANXIOUS: 0
ORTHOPNEA: 0

## 2023-08-03 ASSESSMENT — FIBROSIS 4 INDEX: FIB4 SCORE: 3.67

## 2023-08-03 NOTE — PROGRESS NOTES
Chief Complaint   Patient presents with    Hypertension    Atrial Fibrillation       Subjective     Jeny Bennett is a 89 y.o. female who presents today for follow-up. They have a history of HFrecEF, permanent atrial fibrillation, SSS s/p pacemaker implantation [facility] in 2018, on chronic anticoagulation with reduced dose of Eliquis, intolerant to amiodarone, TR, secondary pulmonary hypertension (likely WHO group 2), gallbladder mass without malignancy, recent appendectomy on 2/15/2023.    They are accompanied today by her daughter    Last seen by Dr. Edmondson on 3/9/2023 at that visit she was restarted on her Eliquis, continue on metoprolol and spironolactone, discontinued on lisinopril for low blood pressure readings, continued on Lasix with potassium supplement    Today 8/3/2023: She is stable from cardiovascular standpoint.  NYHA class II/III symptoms she is limited by back pain, no shortness of breath, low stamina.  She has a moderate amount of leg swelling, she believes this is partly due to her car ride from [place]    No symptoms of chest pain or palpitations.      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    Hypertension     New onset of congestive heart failure (HCC) 11/10/2022    DELIA (obstructive sleep apnea) 7/30/2021    Paroxysmal atrial fibrillation (HCC)     Sepsis (HCC) 5/5/2018     Past Surgical History:   Procedure Laterality Date    CO LAP,APPENDECTOMY N/A 2/16/2023    Procedure: APPENDECTOMY, LAPAROSCOPIC;  Surgeon: Brett Perez M.D.;  Location: SURGERY [facility];  Service: General    PACEMAKER INSERTION Left 05/07/2018    [facility] [employer] MRI 2272 implanted by Dr. Jones.     Family History   Problem Relation Age of Onset    Arthritis Mother     Cancer Mother     Hypertension Mother     Hypertension Father     Heart Disease Father      Hyperlipidemia Father     Lung Disease Sister     Hypertension Sister      Social History     Socioeconomic History    Marital status:      Spouse name: Not on file    Number of children: Not on file    Years of education: Not on file    Highest education level: Not on file   Occupational History    Not on file   Tobacco Use    Smoking status: Former     Packs/day: 0.50     Types: Cigarettes     Quit date: 1989     Years since quittin.6    Smokeless tobacco: Never   Vaping Use    Vaping Use: Never used   Substance and Sexual Activity    Alcohol use: Yes     Comment: occasional glass of wine    Drug use: No    Sexual activity: Not on file   Other Topics Concern    Not on file   Social History Narrative    Not on file     Social Determinants of Health     Financial Resource Strain: Not on file   Food Insecurity: Not on file   Transportation Needs: Not on file   Physical Activity: Not on file   Stress: Not on file   Social Connections: Not on file   Intimate Partner Violence: Not on file   Housing Stability: Not on file     Allergies   Allergen Reactions    Amiodarone      Atrial fibrillation..organ prob's    Sulfa Drugs      Rash, chills, fever    Augmentin Nausea    Ciprofloxacin      Pt states it just didn't agree with her.     Outpatient Encounter Medications as of 8/3/2023   Medication Sig Dispense Refill    ELIQUIS 2.5 MG Tab       econazole nitrate 1 % cream       metoprolol SR (TOPROL XL) 50 MG TABLET SR 24 HR Take 1 Tablet by mouth every day. 100 Tablet 3    spironolactone (ALDACTONE) 25 MG Tab Take 0.5 Tablets by mouth every day. 45 Tablet 3    digoxin (LANOXIN) 125 MCG Tab Take 1 Tablet by mouth every day. 90 Tablet 3    zolpidem (AMBIEN) 5 MG Tab Take 2.5 mg by mouth at bedtime.      furosemide (LASIX) 40 MG Tab Take 1 Tablet by mouth every day. 30 Tablet     VITAMIN D PO Take 1 Tablet by mouth every Monday, Wednesday, and Friday.      pantoprazole (PROTONIX) 20 MG tablet Take 1 Tablet by  "mouth every day.      allopurinol (ZYLOPRIM) 300 MG Tab Take 1 Tablet by mouth every day.      hydrocodone-acetaminophen (NORCO) 7.5-325 MG per tablet Take 1-2 Tabs by mouth every four hours as needed for Severe Pain.      [DISCONTINUED] acetaminophen (TYLENOL) 325 MG Tab Take 2 Tablets by mouth every 6 hours. 30 Tablet 0    [DISCONTINUED] docusate sodium 100 MG Cap Take 100 mg by mouth 2 times a day as needed for Constipation.       No facility-administered encounter medications on file as of 8/3/2023.     Review of Systems   Constitutional: Negative.    HENT: Negative.     Eyes: Negative.    Respiratory:  Positive for shortness of breath.    Cardiovascular:  Positive for leg swelling. Negative for chest pain, palpitations, orthopnea and PND.   Gastrointestinal: Negative.    Genitourinary: Negative.    Musculoskeletal: Negative.    Skin: Negative.    Neurological: Negative.    Endo/Heme/Allergies: Negative.    Psychiatric/Behavioral:  Negative for depression. The patient is not nervous/anxious.               Objective     /66 (BP Location: Left arm, Patient Position: Sitting, BP Cuff Size: Adult)   Pulse 79   Resp 16   Ht 1.651 m (5' 5\")   Wt 62.6 kg (138 lb)   SpO2 94%   BMI 22.96 kg/m²     Physical Exam  Constitutional:       Appearance: Normal appearance.   HENT:      Head: Normocephalic and atraumatic.   Neck:      Vascular: No JVD.   Cardiovascular:      Rate and Rhythm: Normal rate. Rhythm irregular.      Pulses: Normal pulses.      Heart sounds: Normal heart sounds. No murmur heard.     No friction rub.   Pulmonary:      Effort: Pulmonary effort is normal. No respiratory distress.      Breath sounds: Normal breath sounds.   Abdominal:      General: There is no distension.      Palpations: Abdomen is soft.   Musculoskeletal:      Right lower le+ Edema present.      Left lower le+ Edema present.   Skin:     General: Skin is warm and dry.   Neurological:      General: No focal deficit " present.      Mental Status: She is alert and oriented to person, place, and time.   Psychiatric:         Mood and Affect: Mood normal.         Behavior: Behavior normal.            Lab Results   Component Value Date/Time    CHOLSTRLTOT 110 12/13/2016 01:00 AM    LDL 63 12/13/2016 01:00 AM    HDL 37 (A) 12/13/2016 01:00 AM    TRIGLYCERIDE 49 12/13/2016 01:00 AM       Lab Results   Component Value Date/Time    SODIUM 138 02/18/2023 01:52 AM    POTASSIUM 3.5 (L) 02/18/2023 01:52 AM    CHLORIDE 103 02/18/2023 01:52 AM    CO2 25 02/18/2023 01:52 AM    GLUCOSE 115 (H) 02/18/2023 01:52 AM    BUN 33 (H) 02/18/2023 01:52 AM    CREATININE 0.79 02/18/2023 01:52 AM     Lab Results   Component Value Date/Time    ALKPHOSPHAT 65 02/15/2023 07:40 PM    ASTSGOT 27 02/15/2023 07:40 PM    ALTSGPT 20 02/15/2023 07:40 PM    TBILIRUBIN 1.9 (H) 02/15/2023 07:40 PM          Echocardiogram 2/7/2023  CONCLUSIONS  Compared to the prior study on 10/15/18, pulmonary pressure and   tricuspid regurgitation appears improved.  Left ventricular systolic function is low normal.   The left ventricular ejection fraction is visually estimated to be 50-  55%.   Aortic valve sclerosis without significant stenosis.   Estimated right ventricular systolic pressure is 40 mmHg.    Assessment & Plan     1. Permanent atrial fibrillation (HCC)        2. Essential hypertension        3. S/P placement of cardiac pacemaker        4. Sick sinus syndrome (HCC)        5. Chronic congestive heart failure, unspecified heart failure type (HCC)            Medical Decision Making: Today's Assessment/Status/Plan:        Heart Failure with recovered ejection Fraction, stage C NYHA class II/III  -EF 55%  - Diuretic: lasix 40 mg daily  - Aldosterone antagonist: spironolactone 12.5 mg  - outside labs showed BNP of 153  - encourage heart healthy diet and activity as tolerated  - echo ordered for next year    Permanent atrial fibrillation  -Continue digoxin 125 mcg daily,  metoprolol 50 mg daily, Eliquis 2.5 mg twice daily  - asymptomatic    Permanent pacemaker in situ  -Appropriate function on last review, 6/29/2023    Hypertension  - good control  -Continue medications as above  - goal < 130/80  - check BP daily 2 hours after taking medication and keep log of measurements     Follow-up with Magaly LEONE in 6 months with echo    This note was dictated using Dragon speech recognition software.

## 2023-08-03 NOTE — PROGRESS NOTES
Device is functioning appropriately.  Programmed RA sensing to 0.2 mv to undersensing of AF noted.

## 2023-08-09 NOTE — CARDIAC REMOTE MONITOR - SCAN
Device transmission reviewed. Device demonstrated appropriate function.       Electronically Signed by: Steven Willett M.D.    8/10/2023  8:43 AM

## 2023-08-10 PROCEDURE — 93280 PM DEVICE PROGR EVAL DUAL: CPT | Mod: 26 | Performed by: INTERNAL MEDICINE

## 2023-09-28 ENCOUNTER — NON-PROVIDER VISIT (OUTPATIENT)
Dept: CARDIOLOGY | Facility: MEDICAL CENTER | Age: 88
End: 2023-09-28
Payer: MEDICARE

## 2023-09-28 PROCEDURE — 93294 REM INTERROG EVL PM/LDLS PM: CPT | Performed by: INTERNAL MEDICINE

## 2023-09-28 NOTE — CARDIAC REMOTE MONITOR - SCAN
Device transmission reviewed. Device demonstrated appropriate function.       Electronically Signed by: Bella Jones M.D.    10/6/2023  9:46 AM

## 2023-10-16 ENCOUNTER — TELEPHONE (OUTPATIENT)
Dept: CARDIOLOGY | Facility: MEDICAL CENTER | Age: 88
End: 2023-10-16
Payer: MEDICARE

## 2023-10-16 DIAGNOSIS — I48.0 PAROXYSMAL ATRIAL FIBRILLATION (HCC): ICD-10-CM

## 2023-10-16 NOTE — TELEPHONE ENCOUNTER
Caller: [phone] (home)     Medication Name and Dosage: digoxin (LANOXIN) 125 MCG Tab     Medication amount left: 10 days worth    Preferred Pharmacy: [facility] on [facility]    Other questions (Topic): Patient states she requested refill of this prescription over a week ago from pharmacy and was told by pharmacy that we have not responded.      Callback Number (Will only call for issues): [phone] (home)     Thank you,  Brittny FARRAR

## 2023-10-17 RX ORDER — DIGOXIN 125 MCG
125 TABLET ORAL DAILY
Qty: 90 TABLET | Refills: 3 | Status: SHIPPED | OUTPATIENT
Start: 2023-10-17

## 2023-12-28 ENCOUNTER — NON-PROVIDER VISIT (OUTPATIENT)
Dept: CARDIOLOGY | Facility: MEDICAL CENTER | Age: 88
End: 2023-12-28
Payer: MEDICARE

## 2023-12-28 PROCEDURE — 93294 REM INTERROG EVL PM/LDLS PM: CPT | Performed by: INTERNAL MEDICINE

## 2023-12-29 ENCOUNTER — PATIENT MESSAGE (OUTPATIENT)
Dept: CARDIOLOGY | Facility: MEDICAL CENTER | Age: 88
End: 2023-12-29
Payer: MEDICARE

## 2023-12-29 DIAGNOSIS — I50.21 ACUTE HFREF (HEART FAILURE WITH REDUCED EJECTION FRACTION) (HCC): ICD-10-CM

## 2023-12-29 DIAGNOSIS — Z79.899 HIGH RISK MEDICATION USE: ICD-10-CM

## 2023-12-29 DIAGNOSIS — I50.9 NEW ONSET OF CONGESTIVE HEART FAILURE (HCC): ICD-10-CM

## 2023-12-29 RX ORDER — SPIRONOLACTONE 25 MG/1
12.5 TABLET ORAL DAILY
Qty: 45 TABLET | Refills: 0 | Status: SHIPPED | OUTPATIENT
Start: 2023-12-29

## 2023-12-29 NOTE — CARDIAC REMOTE MONITOR - SCAN
Device transmission reviewed. Device demonstrated appropriate function.       Electronically Signed by: Steven Willett M.D.    12/28/2023  5:22 PM

## 2023-12-29 NOTE — TELEPHONE ENCOUNTER
Received request via: Patient    Was the patient seen in the last year in this department? Yes    Does the patient have an active prescription (recently filled or refills available) for medication(s) requested? Yes.     Does the patient have long-term Plus and need 100 day supply (blood pressure, diabetes and cholesterol meds only)? Patient does not have SCP    Patient is out of the medication.    Thank you,     Mary JONES

## 2024-01-21 DIAGNOSIS — I48.91 ATRIAL FIBRILLATION WITH RVR (HCC): ICD-10-CM

## 2024-01-21 DIAGNOSIS — I50.9 NEW ONSET OF CONGESTIVE HEART FAILURE (HCC): ICD-10-CM

## 2024-01-21 DIAGNOSIS — I48.21 PERMANENT ATRIAL FIBRILLATION (HCC): ICD-10-CM

## 2024-01-21 DIAGNOSIS — I50.22 CHRONIC HFREF (HEART FAILURE WITH REDUCED EJECTION FRACTION) (HCC): ICD-10-CM

## 2024-01-22 RX ORDER — METOPROLOL SUCCINATE 50 MG/1
50 TABLET, EXTENDED RELEASE ORAL DAILY
Qty: 100 TABLET | Refills: 2 | Status: SHIPPED | OUTPATIENT
Start: 2024-01-22

## 2024-01-31 ENCOUNTER — TELEPHONE (OUTPATIENT)
Dept: CARDIOLOGY | Facility: MEDICAL CENTER | Age: 89
End: 2024-01-31
Payer: MEDICARE

## 2024-01-31 NOTE — TELEPHONE ENCOUNTER
Caller: Jeny Bennett      Topic/issue: Patient was asking for her lab orders to be sent over to [facility] in [place], [place]  Fax# [phone]      Callback Number: [phone]      Thank you      -Walter MALCOLM

## 2024-02-09 ENCOUNTER — APPOINTMENT (OUTPATIENT)
Dept: CARDIOLOGY | Facility: MEDICAL CENTER | Age: 89
End: 2024-02-09
Payer: MEDICARE

## 2024-02-12 ENCOUNTER — APPOINTMENT (OUTPATIENT)
Dept: CARDIOLOGY | Facility: MEDICAL CENTER | Age: 89
End: 2024-02-12
Attending: INTERNAL MEDICINE
Payer: MEDICARE

## 2024-03-28 ENCOUNTER — NON-PROVIDER VISIT (OUTPATIENT)
Dept: CARDIOLOGY | Facility: MEDICAL CENTER | Age: 89
End: 2024-03-28

## 2024-04-10 DIAGNOSIS — I50.21 ACUTE HFREF (HEART FAILURE WITH REDUCED EJECTION FRACTION) (HCC): ICD-10-CM

## 2024-04-10 DIAGNOSIS — I50.9 NEW ONSET OF CONGESTIVE HEART FAILURE (HCC): ICD-10-CM

## 2024-04-10 DIAGNOSIS — Z79.899 HIGH RISK MEDICATION USE: ICD-10-CM

## 2024-04-11 RX ORDER — SPIRONOLACTONE 25 MG/1
12.5 TABLET ORAL DAILY
Qty: 45 TABLET | Refills: 0 | OUTPATIENT
Start: 2024-04-11

## 2024-04-15 ENCOUNTER — TELEPHONE (OUTPATIENT)
Dept: CARDIOLOGY | Facility: MEDICAL CENTER | Age: 89
End: 2024-04-15
Payer: MEDICARE

## 2024-04-15 DIAGNOSIS — I50.9 NEW ONSET OF CONGESTIVE HEART FAILURE (HCC): ICD-10-CM

## 2024-04-15 DIAGNOSIS — I50.21 ACUTE HFREF (HEART FAILURE WITH REDUCED EJECTION FRACTION) (HCC): ICD-10-CM

## 2024-04-15 DIAGNOSIS — Z79.899 HIGH RISK MEDICATION USE: ICD-10-CM

## 2024-04-15 NOTE — TELEPHONE ENCOUNTER
Received request via: Patient    Was the patient seen in the last year in this department? Yes    Does the patient have an active prescription (recently filled or refills available) for medication(s) requested?  YES    [facility]      Does the patient have FDC Plus and need 100 day supply (blood pressure, diabetes and cholesterol meds only)?   Patient does not have SCP    Thank you,   Layne SCHMIDT

## 2024-04-16 ENCOUNTER — PATIENT MESSAGE (OUTPATIENT)
Dept: CARDIOLOGY | Facility: MEDICAL CENTER | Age: 89
End: 2024-04-16
Payer: MEDICARE

## 2024-04-16 RX ORDER — SPIRONOLACTONE 25 MG/1
12.5 TABLET ORAL DAILY
Qty: 45 TABLET | Refills: 0 | Status: SHIPPED | OUTPATIENT
Start: 2024-04-16

## 2024-04-16 NOTE — TELEPHONE ENCOUNTER
90 day refill provided. Patient notified to complete lab work and schedule appointment for August.

## 2024-04-19 ENCOUNTER — HOSPITAL ENCOUNTER (OUTPATIENT)
Dept: CARDIOLOGY | Facility: MEDICAL CENTER | Age: 89
End: 2024-04-19
Payer: MEDICARE

## 2024-04-19 DIAGNOSIS — I50.9 CHRONIC CONGESTIVE HEART FAILURE, UNSPECIFIED HEART FAILURE TYPE (HCC): ICD-10-CM

## 2024-04-19 LAB — LV EJECT FRACT  99904: 55

## 2024-04-19 PROCEDURE — 93306 TTE W/DOPPLER COMPLETE: CPT | Mod: 26 | Performed by: INTERNAL MEDICINE

## 2024-04-19 PROCEDURE — 93306 TTE W/DOPPLER COMPLETE: CPT

## 2024-05-09 DIAGNOSIS — I50.21 ACUTE HFREF (HEART FAILURE WITH REDUCED EJECTION FRACTION) (HCC): ICD-10-CM

## 2024-06-27 ENCOUNTER — NON-PROVIDER VISIT (OUTPATIENT)
Dept: CARDIOLOGY | Facility: MEDICAL CENTER | Age: 89
End: 2024-06-27
Payer: MEDICARE

## 2024-06-27 PROCEDURE — 93294 REM INTERROG EVL PM/LDLS PM: CPT | Performed by: INTERNAL MEDICINE

## 2024-07-25 NOTE — CARDIAC REMOTE MONITOR - SCAN
Device transmission reviewed. Device demonstrated appropriate function.       Electronically Signed by: Steven Willett M.D.    9/29/2022  8:57 PM     With known history of COPD.  No acute exacerbation.  Continue with respiratory protocol

## 2024-08-06 ENCOUNTER — NON-PROVIDER VISIT (OUTPATIENT)
Dept: CARDIOLOGY | Facility: MEDICAL CENTER | Age: 89
End: 2024-08-06
Payer: MEDICARE

## 2024-08-10 ENCOUNTER — NON-PROVIDER VISIT (OUTPATIENT)
Dept: CARDIOLOGY | Facility: MEDICAL CENTER | Age: 89
End: 2024-08-10
Payer: MEDICARE

## 2024-08-12 ENCOUNTER — NON-PROVIDER VISIT (OUTPATIENT)
Dept: CARDIOLOGY | Facility: MEDICAL CENTER | Age: 89
End: 2024-08-12
Payer: MEDICARE

## 2024-08-14 NOTE — CARDIAC REMOTE MONITOR - SCAN
Device transmission reviewed. Device demonstrated appropriate function.       Electronically Signed by: Steven Willett M.D.    8/21/2024  9:45 AM

## 2024-08-15 NOTE — CARDIAC REMOTE MONITOR - SCAN
Device transmission reviewed. Device demonstrated appropriate function. AF with RVR      Electronically Signed by: Steven Willett M.D.    8/21/2024  9:49 AM

## 2024-08-15 NOTE — CARDIAC REMOTE MONITOR - SCAN
Device transmission reviewed. Device demonstrated appropriate function.       Electronically Signed by: Steven Willett M.D.    8/21/2024  9:51 AM

## 2024-09-16 DIAGNOSIS — Z79.899 HIGH RISK MEDICATION USE: ICD-10-CM

## 2024-09-16 DIAGNOSIS — I50.21 ACUTE HFREF (HEART FAILURE WITH REDUCED EJECTION FRACTION) (HCC): ICD-10-CM

## 2024-09-16 DIAGNOSIS — I50.9 NEW ONSET OF CONGESTIVE HEART FAILURE (HCC): ICD-10-CM

## 2024-09-16 RX ORDER — SPIRONOLACTONE 25 MG/1
TABLET ORAL
Qty: 45 TABLET | Refills: 0 | OUTPATIENT
Start: 2024-09-16

## 2024-09-16 NOTE — TELEPHONE ENCOUNTER
Courtesy refilled provided 4/16. Pt notified at that time. Pt needs annual appt.     To scheduling, please call and schedule pt for annual appt ~thank you

## 2024-09-17 RX ORDER — SPIRONOLACTONE 25 MG/1
12.5 TABLET ORAL DAILY
Qty: 11 TABLET | Refills: 0 | Status: SHIPPED | OUTPATIENT
Start: 2024-09-17

## 2024-09-26 ENCOUNTER — NON-PROVIDER VISIT (OUTPATIENT)
Dept: CARDIOLOGY | Facility: MEDICAL CENTER | Age: 89
End: 2024-09-26
Payer: MEDICARE

## 2024-10-08 ENCOUNTER — APPOINTMENT (OUTPATIENT)
Dept: CARDIOLOGY | Facility: PHYSICIAN GROUP | Age: 89
End: 2024-10-08
Payer: MEDICARE

## 2024-10-08 VITALS
SYSTOLIC BLOOD PRESSURE: 124 MMHG | DIASTOLIC BLOOD PRESSURE: 64 MMHG | HEART RATE: 79 BPM | HEIGHT: 64 IN | RESPIRATION RATE: 12 BRPM | WEIGHT: 132.28 LBS | BODY MASS INDEX: 22.58 KG/M2 | OXYGEN SATURATION: 94 %

## 2024-10-08 DIAGNOSIS — Z79.01 CHRONIC ANTICOAGULATION: ICD-10-CM

## 2024-10-08 DIAGNOSIS — D68.69 SECONDARY HYPERCOAGULABLE STATE (HCC): ICD-10-CM

## 2024-10-08 DIAGNOSIS — I10 ESSENTIAL HYPERTENSION: ICD-10-CM

## 2024-10-08 DIAGNOSIS — I50.32 CHRONIC HEART FAILURE WITH PRESERVED EJECTION FRACTION (HFPEF) (HCC): ICD-10-CM

## 2024-10-08 DIAGNOSIS — Z95.0 CARDIAC PACEMAKER IN SITU: ICD-10-CM

## 2024-10-08 DIAGNOSIS — I49.5 SINUS NODE DYSFUNCTION (HCC): ICD-10-CM

## 2024-10-08 DIAGNOSIS — I27.29 OTHER SECONDARY PULMONARY HYPERTENSION (HCC): ICD-10-CM

## 2024-10-08 DIAGNOSIS — Z95.0 S/P PLACEMENT OF CARDIAC PACEMAKER: ICD-10-CM

## 2024-10-08 DIAGNOSIS — I36.1 NONRHEUMATIC TRICUSPID VALVE REGURGITATION: ICD-10-CM

## 2024-10-08 PROCEDURE — 3078F DIAST BP <80 MM HG: CPT | Performed by: INTERNAL MEDICINE

## 2024-10-08 PROCEDURE — 3074F SYST BP LT 130 MM HG: CPT | Performed by: INTERNAL MEDICINE

## 2024-10-08 PROCEDURE — 99214 OFFICE O/P EST MOD 30 MIN: CPT | Performed by: INTERNAL MEDICINE

## 2024-10-08 ASSESSMENT — FIBROSIS 4 INDEX: FIB4 SCORE: 3.71

## 2024-10-09 ENCOUNTER — NON-PROVIDER VISIT (OUTPATIENT)
Dept: CARDIOLOGY | Facility: PHYSICIAN GROUP | Age: 89
End: 2024-10-09
Payer: MEDICARE

## 2024-10-09 VITALS
SYSTOLIC BLOOD PRESSURE: 110 MMHG | RESPIRATION RATE: 12 BRPM | BODY MASS INDEX: 22.58 KG/M2 | DIASTOLIC BLOOD PRESSURE: 52 MMHG | HEIGHT: 64 IN | WEIGHT: 132.28 LBS | OXYGEN SATURATION: 96 % | HEART RATE: 64 BPM

## 2024-10-09 DIAGNOSIS — I49.5 SICK SINUS SYNDROME (HCC): ICD-10-CM

## 2024-10-09 DIAGNOSIS — E78.2 MIXED HYPERLIPIDEMIA: ICD-10-CM

## 2024-10-09 DIAGNOSIS — Z95.0 S/P PLACEMENT OF CARDIAC PACEMAKER: ICD-10-CM

## 2024-10-09 DIAGNOSIS — I48.21 PERMANENT ATRIAL FIBRILLATION (HCC): ICD-10-CM

## 2024-10-09 DIAGNOSIS — I44.30 AV BLOCK: ICD-10-CM

## 2024-10-09 PROCEDURE — 93288 INTERROG EVL PM/LDLS PM IP: CPT | Mod: 26 | Performed by: NURSE PRACTITIONER

## 2024-10-09 ASSESSMENT — FIBROSIS 4 INDEX: FIB4 SCORE: 3.71

## 2024-10-31 DIAGNOSIS — I48.91 ATRIAL FIBRILLATION WITH RVR (HCC): ICD-10-CM

## 2024-10-31 DIAGNOSIS — I48.21 PERMANENT ATRIAL FIBRILLATION (HCC): ICD-10-CM

## 2024-10-31 DIAGNOSIS — I50.22 CHRONIC HFREF (HEART FAILURE WITH REDUCED EJECTION FRACTION) (HCC): ICD-10-CM

## 2024-10-31 DIAGNOSIS — I50.9 NEW ONSET OF CONGESTIVE HEART FAILURE (HCC): ICD-10-CM

## 2024-10-31 RX ORDER — METOPROLOL SUCCINATE 50 MG/1
50 TABLET, EXTENDED RELEASE ORAL DAILY
Qty: 90 TABLET | Refills: 3 | Status: SHIPPED | OUTPATIENT
Start: 2024-10-31

## 2024-11-03 DIAGNOSIS — I48.0 PAROXYSMAL ATRIAL FIBRILLATION (HCC): ICD-10-CM

## 2024-11-05 RX ORDER — DIGOXIN 125 MCG
125 TABLET ORAL DAILY
Qty: 90 TABLET | Refills: 3 | Status: SHIPPED | OUTPATIENT
Start: 2024-11-05

## 2024-11-05 NOTE — TELEPHONE ENCOUNTER
Is the patient due for a refill? Yes    Was the patient seen the past year? Yes    Date of last office visit: 10/8/2024    Does the patient have an upcoming appointment?  No      Provider to refill:LS    Does the patient have alf Plus and need 100-day supply? (This applies to ALL medications) Patient does not have SCP

## 2024-12-26 ENCOUNTER — NON-PROVIDER VISIT (OUTPATIENT)
Dept: CARDIOLOGY | Facility: MEDICAL CENTER | Age: 89
End: 2024-12-26
Payer: MEDICARE

## 2024-12-26 PROCEDURE — 93294 REM INTERROG EVL PM/LDLS PM: CPT | Performed by: STUDENT IN AN ORGANIZED HEALTH CARE EDUCATION/TRAINING PROGRAM

## 2025-02-05 DIAGNOSIS — Z79.899 HIGH RISK MEDICATION USE: ICD-10-CM

## 2025-02-05 DIAGNOSIS — I50.9 NEW ONSET OF CONGESTIVE HEART FAILURE (HCC): ICD-10-CM

## 2025-02-05 DIAGNOSIS — I50.21 ACUTE HFREF (HEART FAILURE WITH REDUCED EJECTION FRACTION) (HCC): ICD-10-CM

## 2025-02-05 RX ORDER — SPIRONOLACTONE 25 MG/1
12.5 TABLET ORAL DAILY
Qty: 45 TABLET | Refills: 0 | Status: SHIPPED | OUTPATIENT
Start: 2025-02-05

## 2025-02-05 NOTE — TELEPHONE ENCOUNTER
LS    Received request via: Patient    Was the patient seen in the last year in this department? Yes    Does the patient have an active prescription (recently filled or refills available) for medication(s) requested? No    [facility]     Does the patient have halfway Plus and need 100-day supply? (This applies to ALL medications) Patient does not have SCP

## 2025-02-11 ENCOUNTER — APPOINTMENT (OUTPATIENT)
Dept: CARDIOLOGY | Facility: MEDICAL CENTER | Age: OVER 89
End: 2025-02-11
Attending: INTERNAL MEDICINE
Payer: MEDICARE

## 2025-03-11 NOTE — PROGRESS NOTES
Medical Decision Making:  Today's Assessment / Status / Plan:   -Multiple cardiac items to address but relatively stable.  -Chronic atrial fibrillation now, no symptoms.  4 minutes of faster A-fib rates only on her heart monitor check.  Intermittently ventricular paced which is probably during sleep.  No symptoms.  Will continue on metoprolol and digoxin.  -Clinically doing better with higher doses of his furosemide and her tricuspid regurgitation and secondary pulmonary pressures have improved.  I need her to get back to checking her weights regularly despite her leg wound.  CHF is confusing along with CVI.  I also need her to write them down and bring them to every visit.  -Due for updated echocardiogram, ordered today.  Will be following up on specifically RVSP.  -Pacemaker checks in person and virtually.  -Tolerating blood thinner for [id number] of 5, no prior TIA/CVA.  -Spironolactone is considered high risk medication because of hyperkalemia, in her case hypokalemia but it needs to be monitored closely, every 6 months.  -Checking her blood pressure at home, I simply want a make sure is not consistently greater than 140 or consistently under 100.  I also need her to check BP when she is feeling off.  -I do not feel strongly that there is a great benefit for primary prevention statin therapy but her primary care is asked her to take rosuvastatin.  Follow-up LFTs recommended as they been abnormal in the past.  She seems to tolerate the medication fine.  -Has a wound on her leg, now dermatitis and CVI.  Using leg wraps.  -I would like to see her back in 6 months, but there is some room for flexibility.  I prefer she can coordinate the visits with her daughter Kathryn and also with her echoes and visits with Ashley.    Addendum: I sent a message to Ashley asking how often Ashley wants to see her in person to help simplify her care.      Assessment:     1. Chronic heart failure with preserved ejection fraction (HFpEF)  (HCC)    2. Permanent atrial fibrillation (HCC)    3. S/P placement of cardiac pacemaker    4. Sinus node dysfunction (HCC)    5. AV block    6. Mixed hyperlipidemia    7. Chronic anticoagulation    8. Secondary hypercoagulable state (HCC)    9. Essential hypertension    10. Abnormal LFTs    11. Nonrheumatic tricuspid valve regurgitation  - EC-ECHOCARDIOGRAM COMPLETE W/O CONT; Future    12. Other secondary pulmonary hypertension (HCC)    13. IFG (impaired fasting glucose)    Other orders  - rosuvastatin (CRESTOR) 5 MG Tab; Take 5 mg by mouth every day.  - gabapentin (NEURONTIN) 100 MG Cap; Take 100 mg by mouth every day.  - thiamine (VITAMIN B-1) 100 MG Tab; Take 100 mg by mouth every day.    Chief Complaint:   Chief Complaint   Patient presents with    Follow-Up     FV DX:Chronic heart failure with preserved ejection fraction    Atrial Fibrillation     Jeny Bennett is a 89 y.o. female who returns today for complex care including chronic HFpEF, persistent atrial fibrillation, SND/PM ([facility] 2018), chronic anticoagulation, tricuspid regurgitation, secondary pulmonary hypertension, fatigue, LE edema, amiodarone intolerance.    First noted PAF around 2005, was symptomatic. Was on amio (had abnormal LFTs on amio) and sotalol, discussion of PM but not done. Moved here in 2011.  Had previously been on amiodarone but allergic to it.  Was then chronic atrial fibrillation with intermittent ventricular pacing, V paced 48%.  Over time her atrial fibrillation burden has increased, now permanent.  Intermittent ventricular pacing.  Rare A-fib with RVR, less than 4 minutes.  She wonders about polypharmacy, I said is okay to stop digoxin.  Remains on metoprolol and digoxin.    Chronic anticoagulation for chads 2 vascular score of at least 5. No prior TIA/CVA.  On the adjusted dose of apixaban, no major bleeding.    Chronic HFpEF, on lasix and spironolactone.  Has mild LE edema, looks like some component of venous  insufficiency.  Used to check her weights at home but her scale broke.   Last visit we reinforced fluid and salt restriction.  Started weighing herself on her new scale.  Forgot to bring the numbers but reports only better 3 pound fluctuation and did not notice a difference between the weights and how she felt.  Now limited because of her leg wound but tells me she will get back to checking her weights.    Prior moderate TR, moderate pulm HTN on prior echo in 2018, more recently in the mild range for both after increased diuretics.    Has a wound on her leg (popped blood blister), now dermatitis and CVI.     Has hypertension, blood pressure overall appears controlled.  Checks at home at times, sometimes getting over 140s.    Last LDL cholesterol was 127.  PCP got her back on rosuvastatin.   Prior abnormal LFTs.  Recent blood work, I do not have the results.    IFG now, no DM yet. PCP will address.    Never evaluated for DELIA.  No major symptoms.    Was feeling poorly, weakness, was told K was low, was given supplements for 1 month, was better but now out of pills.  Feels better after potassium was replaced.  Intermittent feeling shaky, asked her to check blood pressure at t those times.    Takes colchicine/allo for gout.  Had been on mexolicam but having bleeding, on PPI and Iron.  Did have prior GI bleeding in [facility], had bleeding ulcer, colitis, Barretts, GERD.    Trying to stay active and keep her spirits up but gets down at times.    She uses a rolling walker. Noting fatigue.  No syncope.  She is not limited by chest pain, pressure or tightness with activity.   Very mild dyspnea on exertion but not bothersome, no orthopnea.  No significant palpitations.   No stroke/TIA like symptoms.  She does have fatigue, takes some naps.     With her daughter Jo Ann Peralta who is seeing us also.  They live in [place].    DATA REVIEWED by me:  ECG reviewed by me personally:  2-16-23 Afib, V pacing  5/8/2018 Atrial fibrillation,  intermittent ventricular pacing    EP procedure  5-2018  [facility] DC PM    Device check  Ramirez, DDIR  3/27/2025 A-fib burden 99.9%.  A paced 0.4%, V paced 39%.  Normal function.  A-fib with RVR 4 minutes.  9-26-24  AT/AF burden 90%: ongoing  Apaced 0.5%, V paced 52%.  Normal function.  8-4-21  A paced 23%, V paced 43%, mode switch 59%  6-21  A paced 23%, V paced 39%, 31% mode switches   8-5-2020  V paced 54%, A 1% mode switches  10/29/2019   atrial fibrillation, V pacing 48%  4/15/2019  Atrial fibrillation, complete heart block, V paced 44%, atrial paced 1%    Heart monitor  Zio patch   [facility] A. fib, average 91, range , no triggers, intermittent bundle branch block     Echocardiogram  4-19-24  Compared to the prior study on 02/07/2023, no significant changes  Normal LV size, thickness and systolic function, sigmoid septum.    Estimated LVEF 55%  Diastolic function difficult to assess given arrhythmia but appears   abnormal  RV grossly normal in size and function  Mild-moderate TR  Aortic valve sclerosis without stenosis. Mild AI  IVC not well-visualized  No pericardial effusion  Estimated RVSP  40mmHg  10/15/2018  Normal left ventricular systolic function. EF 60%.  Mild concentric left ventricular hypertrophy.  Moderately dilated left atrium.  Mild mitral regurgitation.  Aortic sclerosis without stenosis.  Mild (borderline moderate) aortic insufficiency.  Moderate tricuspid regurgitation.  Right ventricular systolic pressure is estimated to be 47 mmHg + JVP.  Compared to the images of the study done 12/12/2016 - there has been a   mild increase in estimated right sided pressures and no other significant change.  12/14/2016:  Mildly depressed left ventricular ejection fraction, EF 45%.  No thrombus detected in the left atrial appendage    Stress test  12-7-22 PET nuclear   No evidence of significant jeopardized viable myocardium or prior myocardial    infarction.   Extracardiac uptake noted.   Normal  left ventricular wall motion.   REST EF=52%   STRESS=53%   ECG INTERPRETATION   Nondiagnostic ECG portion of pharmacological stress study.    Most recent labs:     6-15-21  Na 142, K 4.2, cr 0.94    9-17-19 total cholesterol 181, triglycerides 269, HDL 59, , sodium 140, testing 3.9, creatinine 1, LFTs normal, normal BNP.    Lab Results   Component Value Date/Time    HEMOGLOBIN 12.4 02/18/2023 01:52 AM    HEMATOCRIT 37.3 02/18/2023 01:52 AM    MCV 93.5 02/18/2023 01:52 AM    INR 1.07 05/05/2018 05:30 AM      Lab Results   Component Value Date/Time    SODIUM 138 02/18/2023 01:52 AM    POTASSIUM 3.5 (L) 02/18/2023 01:52 AM    CHLORIDE 103 02/18/2023 01:52 AM    CO2 25 02/18/2023 01:52 AM    GLUCOSE 115 (H) 02/18/2023 01:52 AM    BUN 33 (H) 02/18/2023 01:52 AM    CREATININE 0.79 02/18/2023 01:52 AM      Lab Results   Component Value Date/Time    ASTSGOT 27 02/15/2023 07:40 PM    ALTSGPT 20 02/15/2023 07:40 PM    ALBUMIN 3.8 02/15/2023 07:40 PM      Lab Results   Component Value Date/Time    CHOLSTRLTOT 110 12/13/2016 01:00 AM    LDL 63 12/13/2016 01:00 AM    HDL 37 (A) 12/13/2016 01:00 AM    TRIGLYCERIDE 49 12/13/2016 01:00 AM       5/10/2018 hemoglobin 12, 180, sodium 130, potassium 5, creatinine 0.98    5/7/2018 LFTs normal    12/30/2016 LDL 63, HDL 37, triglycerides 49, total cholesterol 110    Past Medical History:   Diagnosis Date    Arthritis     Back pain     Chronic anticoagulation     Fall 12-    Heart block, AV 05/2018    Status post pacemaker placement.    Hypertension     New onset of congestive heart failure (HCC) 11/10/2022    DELIA (obstructive sleep apnea) 7/30/2021    Paroxysmal atrial fibrillation (HCC)     Sepsis (HCC) 5/5/2018     Past Surgical History:   Procedure Laterality Date    HI LAP,APPENDECTOMY N/A 2/16/2023    Procedure: APPENDECTOMY, LAPAROSCOPIC;  Surgeon: Brett Perez M.D.;  Location: SURGERY [facility];  Service: General    PACEMAKER INSERTION Left 05/07/2018    [facility]  [employer] MRI 2272 implanted by Dr. Jones.     Family History   Problem Relation Age of Onset    Arthritis Mother     Cancer Mother     Hypertension Mother     Hypertension Father     Heart Disease Father     Hyperlipidemia Father     Lung Disease Sister     Hypertension Sister      Social History     Socioeconomic History    Marital status:      Spouse name: Not on file    Number of children: Not on file    Years of education: Not on file    Highest education level: Not on file   Occupational History    Not on file   Tobacco Use    Smoking status: Former     Current packs/day: 0.00     Types: Cigarettes     Quit date: 1989     Years since quittin.2    Smokeless tobacco: Never   Vaping Use    Vaping status: Never Used   Substance and Sexual Activity    Alcohol use: Yes     Comment: occasional glass of wine    Drug use: No    Sexual activity: Not on file   Other Topics Concern    Not on file   Social History Narrative    Not on file     Social Drivers of Health     Financial Resource Strain: Not on file   Food Insecurity: Not on file   Transportation Needs: Not on file   Physical Activity: Not on file   Stress: Not on file   Social Connections: Not on file   Intimate Partner Violence: Not on file   Housing Stability: Not on file     Allergies   Allergen Reactions    Amiodarone      Atrial fibrillation..organ prob's    Sulfa Drugs      Rash, chills, fever    Augmentin Nausea    Ciprofloxacin      Pt states it just didn't agree with her.       Current Outpatient Medications   Medication Sig Dispense Refill    rosuvastatin (CRESTOR) 5 MG Tab Take 5 mg by mouth every day.      gabapentin (NEURONTIN) 100 MG Cap Take 100 mg by mouth every day.      thiamine (VITAMIN B-1) 100 MG Tab Take 100 mg by mouth every day.      spironolactone (ALDACTONE) 25 MG Tab Take 0.5 Tablets by mouth every day. 45 Tablet 0    digoxin (LANOXIN) 125 MCG Tab TAKE 1 TABLET DAILY. 90 Tablet 3    metoprolol SR (TOPROL XL) 50 MG  "TABLET SR 24 HR TAKE 1 TABLET DAILY. 90 Tablet 3    ELIQUIS 2.5 MG Tab 2.5 mg 2 times a day.      zolpidem (AMBIEN) 5 MG Tab Take 2.5 mg by mouth at bedtime.      furosemide (LASIX) 40 MG Tab Take 1 Tablet by mouth every day. 30 Tablet     pantoprazole (PROTONIX) 20 MG tablet Take 1 Tablet by mouth every day.      allopurinol (ZYLOPRIM) 300 MG Tab Take 1 Tablet by mouth every day.      hydrocodone-acetaminophen (NORCO) 7.5-325 MG per tablet Take 1-2 Tabs by mouth every four hours as needed for Severe Pain.      econazole nitrate 1 % cream  (Patient not taking: Reported on 3/28/2025)       No current facility-administered medications for this visit.     ROS  All others systems reviewed and negative.     Objective:     /54 (BP Location: Left arm, Patient Position: Sitting, BP Cuff Size: Adult)   Pulse 72   Resp 16   Ht 1.626 m (5' 4\")   Wt 59.4 kg (131 lb)   SpO2 93%  Body mass index is 22.49 kg/m².    Physical Exam   General: No acute distress. Well nourished.  HEENT: EOM grossly intact, no scleral icterus, no pharyngeal erythema.   Neck:  No JVD at 90, no bruits, trachea midline  CVS: irreg irreg. 1/6 sys murmur. 1+ non pitting R LE edema, wrap left LE.  2+ radial pulses, pacemaker pocket intact.  Resp: CTAB. No wheezing or crackles/rhonchi. Normal respiratory effort.  Abdomen: Soft, NT, no angelic hepatomegaly.  MSK/Ext: No clubbing or cyanosis.  Skin: Warm and dry, no rashes.  Neurological: CN III-XII grossly intact. No focal deficits. Using rolling walker.  Psych: A&O x 3, appropriate affect, good judgement    Physical Exam listed below was completed in entrety today and unchanged from 10-8-24 except where noted.  Some elements were copied from my note of that same day, which have been updated where appropriate and all reflect current meedical decision making from today.  I have confirmed and edited as necessary, the PFSH and ROS obtained by others.    Written instructions given today:    -You get your " echocardiogram [facility].  I believe it up to you.  If you want to get it done at [facility] in [place], you will have to call on your own to make an appointment.  Just call the general number.    -It does not bother me that on occasion your blood pressure is high in the doctor's office.  It does not bother me if medication your blood pressure is a little low.  -I need to make sure that the majority of the time you are not dropping below 100 on the top number and that you are not consistently over 140 on the top number.  -Checking at home and writing it down will give us better data.  -Please write down your weights every morning, always document when a medication has been changed.  -You will have to check your blood pressure 2-3 times a week.  Please do not check blood pressure within 30 minutes of caffeine, exercise or large meal.  Please sit quietly for minimum of 5 to 10 minutes before you hit the button.     *On the times that you are feeling shaky or abnormal, do check your blood pressure right away.  Write down all 3 numbers and including your symptoms.  I would not be concerned unless the heart rate was over 100.    -You can take the furosemide anytime of day as it suits your schedule.  -In general I tell people to take the spironolactone in the morning, however it would be reasonable to simply always take the 2 together.  Just do not take them after 4 PM.    Return in about 6 months (around 9/28/2025).    It is my pleasure to participate in the care of Ms. Bennett.  Please do not hesitate to contact me with questions or concerns.    Didi Oro MD, [facility]  Cardiologist [facility]    Please note that this dictation was created using voice recognition software. I have made every reasonable attempt to correct obvious errors, but it is possible there are errors of grammar and possibly content that I did not discover before  finalizing the note.

## 2025-03-27 ENCOUNTER — NON-PROVIDER VISIT (OUTPATIENT)
Dept: CARDIOLOGY | Facility: MEDICAL CENTER | Age: OVER 89
End: 2025-03-27
Payer: MEDICARE

## 2025-03-27 PROCEDURE — 93294 REM INTERROG EVL PM/LDLS PM: CPT | Performed by: INTERNAL MEDICINE

## 2025-03-27 NOTE — CARDIAC REMOTE MONITOR - SCAN
Device transmission reviewed. Device demonstrated appropriate function.       Electronically Signed by: Bella Jones M.D.    4/19/2025  10:33 PM

## 2025-03-28 ENCOUNTER — OFFICE VISIT (OUTPATIENT)
Dept: CARDIOLOGY | Facility: MEDICAL CENTER | Age: OVER 89
End: 2025-03-28
Attending: INTERNAL MEDICINE
Payer: MEDICARE

## 2025-03-28 VITALS
RESPIRATION RATE: 16 BRPM | HEIGHT: 64 IN | OXYGEN SATURATION: 93 % | BODY MASS INDEX: 22.36 KG/M2 | DIASTOLIC BLOOD PRESSURE: 54 MMHG | SYSTOLIC BLOOD PRESSURE: 100 MMHG | HEART RATE: 72 BPM | WEIGHT: 131 LBS

## 2025-03-28 DIAGNOSIS — I44.30 AV BLOCK: ICD-10-CM

## 2025-03-28 DIAGNOSIS — Z95.0 S/P PLACEMENT OF CARDIAC PACEMAKER: ICD-10-CM

## 2025-03-28 DIAGNOSIS — I50.32 CHRONIC HEART FAILURE WITH PRESERVED EJECTION FRACTION (HFPEF) (HCC): ICD-10-CM

## 2025-03-28 DIAGNOSIS — I48.21 PERMANENT ATRIAL FIBRILLATION (HCC): ICD-10-CM

## 2025-03-28 DIAGNOSIS — I10 ESSENTIAL HYPERTENSION: ICD-10-CM

## 2025-03-28 DIAGNOSIS — Z79.01 CHRONIC ANTICOAGULATION: ICD-10-CM

## 2025-03-28 DIAGNOSIS — I27.29 OTHER SECONDARY PULMONARY HYPERTENSION (HCC): ICD-10-CM

## 2025-03-28 DIAGNOSIS — E78.2 MIXED HYPERLIPIDEMIA: ICD-10-CM

## 2025-03-28 DIAGNOSIS — D68.69 SECONDARY HYPERCOAGULABLE STATE (HCC): ICD-10-CM

## 2025-03-28 DIAGNOSIS — I36.1 NONRHEUMATIC TRICUSPID VALVE REGURGITATION: ICD-10-CM

## 2025-03-28 DIAGNOSIS — R79.89 ABNORMAL LFTS: ICD-10-CM

## 2025-03-28 DIAGNOSIS — I49.5 SINUS NODE DYSFUNCTION (HCC): ICD-10-CM

## 2025-03-28 DIAGNOSIS — R73.01 IFG (IMPAIRED FASTING GLUCOSE): ICD-10-CM

## 2025-03-28 PROCEDURE — 3074F SYST BP LT 130 MM HG: CPT | Performed by: INTERNAL MEDICINE

## 2025-03-28 PROCEDURE — 99214 OFFICE O/P EST MOD 30 MIN: CPT | Performed by: INTERNAL MEDICINE

## 2025-03-28 PROCEDURE — 99212 OFFICE O/P EST SF 10 MIN: CPT | Performed by: INTERNAL MEDICINE

## 2025-03-28 PROCEDURE — 3078F DIAST BP <80 MM HG: CPT | Performed by: INTERNAL MEDICINE

## 2025-03-28 RX ORDER — GABAPENTIN 100 MG/1
100 CAPSULE ORAL DAILY
COMMUNITY
Start: 2025-01-21

## 2025-03-28 RX ORDER — ROSUVASTATIN CALCIUM 5 MG/1
5 TABLET, COATED ORAL DAILY
COMMUNITY
Start: 2025-02-28

## 2025-03-28 RX ORDER — GAUZE BANDAGE 2" X 2"
100 BANDAGE TOPICAL DAILY
COMMUNITY

## 2025-03-28 NOTE — PATIENT INSTRUCTIONS
-You get your echocardiogram [facility].  I believe it up to you.  If you want to get it done at [facility] in [place], you will have to call on your own to make an appointment.  Just call the general number.    -It does not bother me that on occasion your blood pressure is high in the doctor's office.  It does not bother me if medication your blood pressure is a little low.  -I need to make sure that the majority of the time you are not dropping below 100 on the top number and that you are not consistently over 140 on the top number.  -Checking at home and writing it down will give us better data.  -Please write down your weights every morning, always document when a medication has been changed.  -You will have to check your blood pressure 2-3 times a week.  Please do not check blood pressure within 30 minutes of caffeine, exercise or large meal.  Please sit quietly for minimum of 5 to 10 minutes before you hit the button.     *On the times that you are feeling shaky or abnormal, do check your blood pressure right away.  Write down all 3 numbers and including your symptoms.  I would not be concerned unless the heart rate was over 100.    -You can take the furosemide anytime of day as it suits your schedule.  -In general I tell people to take the spironolactone in the morning, however it would be reasonable to simply always take the 2 together.  Just do not take them after 4 PM.

## 2025-04-01 DIAGNOSIS — E78.2 MIXED HYPERLIPIDEMIA: ICD-10-CM

## 2025-04-29 DIAGNOSIS — Z79.899 HIGH RISK MEDICATION USE: ICD-10-CM

## 2025-04-29 DIAGNOSIS — I50.9 NEW ONSET OF CONGESTIVE HEART FAILURE (HCC): ICD-10-CM

## 2025-04-29 DIAGNOSIS — I50.21 ACUTE HFREF (HEART FAILURE WITH REDUCED EJECTION FRACTION) (HCC): ICD-10-CM

## 2025-04-30 RX ORDER — SPIRONOLACTONE 25 MG/1
12.5 TABLET ORAL DAILY
Qty: 45 TABLET | Refills: 0 | Status: SHIPPED | OUTPATIENT
Start: 2025-04-30

## 2025-06-26 ENCOUNTER — NON-PROVIDER VISIT (OUTPATIENT)
Dept: CARDIOLOGY | Facility: MEDICAL CENTER | Age: OVER 89
End: 2025-06-26
Payer: MEDICARE

## 2025-06-26 PROCEDURE — 93294 REM INTERROG EVL PM/LDLS PM: CPT | Performed by: STUDENT IN AN ORGANIZED HEALTH CARE EDUCATION/TRAINING PROGRAM

## 2025-06-26 NOTE — CARDIAC REMOTE MONITOR - SCAN
Device transmission reviewed. Device demonstrated appropriate function.       Electronically Signed by: Jose Elias Perrin MD, PhD    6/30/2025  2:13 PM

## 2025-06-30 DIAGNOSIS — Z79.899 HIGH RISK MEDICATION USE: ICD-10-CM

## 2025-06-30 DIAGNOSIS — I50.9 NEW ONSET OF CONGESTIVE HEART FAILURE (HCC): ICD-10-CM

## 2025-06-30 DIAGNOSIS — I50.21 ACUTE HFREF (HEART FAILURE WITH REDUCED EJECTION FRACTION) (HCC): ICD-10-CM

## 2025-06-30 RX ORDER — SPIRONOLACTONE 25 MG/1
TABLET ORAL
Qty: 45 TABLET | Refills: 2 | Status: SHIPPED | OUTPATIENT
Start: 2025-06-30

## 2025-07-17 ENCOUNTER — TELEPHONE (OUTPATIENT)
Dept: CARDIOLOGY | Facility: MEDICAL CENTER | Age: OVER 89
End: 2025-07-17
Payer: MEDICARE

## 2025-07-17 NOTE — TELEPHONE ENCOUNTER
"LS - Please review and advise. Would you like pt to come in for a sooner follow-up appointment after recent hospitalization? Would you like any updated labs? HANNAH I faxed a records request to [facility] in [place], [place] for pt's hospital records. Thank you!    Faxed records request for hospitalization to [facility] in [place], [place]. Scanned confirmation report into [email or web address].     Phone Number Called: [phone]  Call outcome: Spoke to patient regarding message below.  Message: Called to discuss pt's earlier phone call.   Per pt she was admitted at [facility] in [place], [place] for sepsis for cellulitis of left leg over the weekend. Pt states they did some lab work inpatient and told her that her kidneys were stressed. Per pt she thought Dr. Oro should know because she is taking 2 diuretics. Confirmed pt still taking furosemide 40 mg daily and spironolactone 12.5 mg daily. Pt reports that her Lasix makes it so \"[she has] to run to the bathroom sometimes.\"  Pt states she had a \"spike in [her] afib too\" and HR went up to 115 at one point. Pt denies any current symptoms but does endorse some weakness and decreased appetite since hospital stay. Pt states she is \"trying to stay hydrated.\" Pt reported her BP has been around 120s systolic, pulse around 70-low 80s since her hospitalization. Informed pt that we will fax a records request to [facility] and reach out to Dr. Oro for any recs she may have and reach back out to her. Informed pt that LS may wish to have her come in for a sooner follow-up appointment. Pt verbalizes understanding of all discussed above.      "

## 2025-07-17 NOTE — TELEPHONE ENCOUNTER
LYRIC        Caller: Jeny Bennett      Topic/issue: Patient was calling to let us know she was in the hospital over the weekend and they told her that her kidneys were stressed and she was prescribed diuretics and she was asking for her medications to be reevaluated and she asked for a call back. Please advise         Callback Number: [phone]        Thank you    -Walter MALCOLM

## 2025-07-17 NOTE — TELEPHONE ENCOUNTER
Phone Number Called: [phone]  Call outcome: Spoke to patient regarding message below.  Message: Called to inform patient of LS recs. Pt verbalizes understanding and states she will reach out to us if she develops mentioned symptoms prior to her appt with LS in October.     Phone Number Called: [phone]   Call outcome: Spoke with pt's nidatherKathryn.   Message: Called to inform patient of LS recs. Pt's daugther verbalizes understanding and states she will continue to monitor her Mom for mentioned symptoms of weight gain and shortness of breath per LS.

## 2025-07-17 NOTE — TELEPHONE ENCOUNTER
I would call her daughter Kathryn who brings her and sees us also.  Kathryn is pretty on top of it.  Tells to to tell her mother to keep weighing herself.  Sometimes after sepsis treatment people are given IV fluids and I worry that their weight will go up.  As long as she continues to progress, she does not need to come in early.  If she is having symptoms of shortness of breath associated with weight gain of more than 3 pounds beyond her usual weight, tell Kathryn to call us back for instructions.  Thank you.

## (undated) DEVICE — SUTURE GENERAL

## (undated) DEVICE — SEALER VESSEL HARMONIC ACE PLUS WITH ADVANCED HEMOSTASIS 36CM (1/EA)

## (undated) DEVICE — NEEDLE INSFL 120MM 14GA VRRS - (20/BX)

## (undated) DEVICE — TROCAR Z THREAD 12 X 100 - BLADED (6/BX)

## (undated) DEVICE — SUTURE 0 VICRYL PLUS CT-2 - 27 INCH (36/BX)

## (undated) DEVICE — SODIUM CHL IRRIGATION 0.9% 1000ML (12EA/CA)

## (undated) DEVICE — TUBING CLEARLINK DUO-VENT - C-FLO (48EA/CA)

## (undated) DEVICE — ELECTRODE 5MM LHK LAPSCP STERILE DISP- MEGADYNE  (5/CA)

## (undated) DEVICE — TROCAR 5X100 NON BLADED Z-TH - READ KII (6/BX)

## (undated) DEVICE — BAG RETRIEVAL 10ML (10EA/BX)

## (undated) DEVICE — PACK LAP CHOLE OR - (2EA/CA)

## (undated) DEVICE — SLEEVE, VASO, THIGH, MED

## (undated) DEVICE — STAPLER 45MM ARTICULATING - ENDO (3EA/BX)

## (undated) DEVICE — SENSOR OXIMETER ADULT SPO2 RD SET (20EA/BX)

## (undated) DEVICE — SUCTION INSTRUMENT YANKAUER BULBOUS TIP W/O VENT (50EA/CA)

## (undated) DEVICE — CHLORAPREP 26 ML APPLICATOR - ORANGE TINT(25/CA)

## (undated) DEVICE — CANISTER SUCTION 3000ML MECHANICAL FILTER AUTO SHUTOFF MEDI-VAC NONSTERILE LF DISP  (40EA/CA)

## (undated) DEVICE — SET EXTENSION WITH 2 PORTS (48EA/CA) ***PART #2C8610 IS A SUBSTITUTE*****

## (undated) DEVICE — GLOVE BIOGEL SZ 8 SURGICAL PF LTX - (50PR/BX 4BX/CA)

## (undated) DEVICE — LACTATED RINGERS INJ 1000 ML - (14EA/CA 60CA/PF)

## (undated) DEVICE — CANNULA W/SEAL 5X100 Z-THRE - ADED KII (12/BX)

## (undated) DEVICE — STAPLE 45MM VASCULAR WHITE 2.5MM (12EA/BX)

## (undated) DEVICE — SET TUBING PNEUMOCLEAR HIGH FLOW SMOKE EVACUATION (10EA/BX)

## (undated) DEVICE — SUTURE 4-0 MONOCRYL PLUS PS-2 - 27 INCH (36/BX)

## (undated) DEVICE — ELECTRODE DUAL RETURN W/ CORD - (50/PK)

## (undated) DEVICE — COVER LIGHT HANDLE ALC PLUS DISP (18EA/BX)

## (undated) DEVICE — SET LEADWIRE 5 LEAD BEDSIDE DISPOSABLE ECG (1SET OF 5/EA)

## (undated) DEVICE — GOWN WARMING STANDARD FLEX - (30/CA)